# Patient Record
Sex: FEMALE | Race: OTHER | HISPANIC OR LATINO
[De-identification: names, ages, dates, MRNs, and addresses within clinical notes are randomized per-mention and may not be internally consistent; named-entity substitution may affect disease eponyms.]

---

## 2017-04-06 ENCOUNTER — RESULT REVIEW (OUTPATIENT)
Age: 62
End: 2017-04-06

## 2017-04-07 ENCOUNTER — APPOINTMENT (OUTPATIENT)
Dept: GASTROENTEROLOGY | Facility: GI CENTER | Age: 62
End: 2017-04-07

## 2017-04-07 ENCOUNTER — OUTPATIENT (OUTPATIENT)
Dept: OUTPATIENT SERVICES | Facility: HOSPITAL | Age: 62
LOS: 1 days | End: 2017-04-07
Payer: COMMERCIAL

## 2017-04-07 DIAGNOSIS — R10.13 EPIGASTRIC PAIN: ICD-10-CM

## 2017-04-07 PROCEDURE — 88342 IMHCHEM/IMCYTCHM 1ST ANTB: CPT | Mod: 26

## 2017-04-07 PROCEDURE — 43239 EGD BIOPSY SINGLE/MULTIPLE: CPT

## 2017-04-07 PROCEDURE — T1013: CPT

## 2017-04-07 PROCEDURE — 88342 IMHCHEM/IMCYTCHM 1ST ANTB: CPT

## 2017-04-07 PROCEDURE — 88305 TISSUE EXAM BY PATHOLOGIST: CPT | Mod: 26

## 2017-04-07 PROCEDURE — 88305 TISSUE EXAM BY PATHOLOGIST: CPT

## 2017-04-07 PROCEDURE — 88341 IMHCHEM/IMCYTCHM EA ADD ANTB: CPT | Mod: 26

## 2017-04-11 LAB — SURGICAL PATHOLOGY FINAL REPORT - CH: SIGNIFICANT CHANGE UP

## 2017-06-20 ENCOUNTER — APPOINTMENT (OUTPATIENT)
Dept: GASTROENTEROLOGY | Facility: CLINIC | Age: 62
End: 2017-06-20

## 2017-06-20 VITALS
HEIGHT: 63 IN | DIASTOLIC BLOOD PRESSURE: 75 MMHG | SYSTOLIC BLOOD PRESSURE: 131 MMHG | WEIGHT: 146 LBS | BODY MASS INDEX: 25.87 KG/M2 | HEART RATE: 86 BPM | RESPIRATION RATE: 12 BRPM

## 2017-06-20 RX ORDER — OMEPRAZOLE 40 MG/1
40 CAPSULE, DELAYED RELEASE ORAL
Qty: 30 | Refills: 5 | Status: DISCONTINUED | COMMUNITY
Start: 2017-04-10 | End: 2017-06-20

## 2017-06-20 RX ORDER — OMEPRAZOLE 40 MG/1
40 CAPSULE, DELAYED RELEASE ORAL
Qty: 30 | Refills: 5 | Status: DISCONTINUED | COMMUNITY
Start: 2017-04-07 | End: 2017-06-20

## 2017-08-16 ENCOUNTER — APPOINTMENT (OUTPATIENT)
Dept: GASTROENTEROLOGY | Facility: GI CENTER | Age: 62
End: 2017-08-16

## 2017-08-29 ENCOUNTER — APPOINTMENT (OUTPATIENT)
Dept: VASCULAR SURGERY | Facility: CLINIC | Age: 62
End: 2017-08-29
Payer: MEDICAID

## 2017-08-29 VITALS
TEMPERATURE: 98 F | BODY MASS INDEX: 25.69 KG/M2 | SYSTOLIC BLOOD PRESSURE: 155 MMHG | DIASTOLIC BLOOD PRESSURE: 83 MMHG | WEIGHT: 145 LBS | RESPIRATION RATE: 12 BRPM | HEIGHT: 63 IN | HEART RATE: 88 BPM | OXYGEN SATURATION: 98 %

## 2017-08-29 DIAGNOSIS — E11.9 TYPE 2 DIABETES MELLITUS W/OUT COMPLICATIONS: ICD-10-CM

## 2017-08-29 DIAGNOSIS — E03.9 HYPOTHYROIDISM, UNSPECIFIED: ICD-10-CM

## 2017-08-29 DIAGNOSIS — E78.5 HYPERLIPIDEMIA, UNSPECIFIED: ICD-10-CM

## 2017-08-29 PROCEDURE — 99203 OFFICE O/P NEW LOW 30 MIN: CPT | Mod: 25

## 2017-08-29 PROCEDURE — 93970 EXTREMITY STUDY: CPT

## 2017-10-21 ENCOUNTER — APPOINTMENT (OUTPATIENT)
Dept: MAMMOGRAPHY | Facility: CLINIC | Age: 62
End: 2017-10-21

## 2017-11-10 ENCOUNTER — EMERGENCY (EMERGENCY)
Facility: HOSPITAL | Age: 62
LOS: 1 days | Discharge: HOME | End: 2017-11-10
Attending: EMERGENCY MEDICINE
Payer: COMMERCIAL

## 2017-11-10 VITALS
SYSTOLIC BLOOD PRESSURE: 152 MMHG | OXYGEN SATURATION: 99 % | WEIGHT: 139.99 LBS | HEIGHT: 63 IN | RESPIRATION RATE: 20 BRPM | TEMPERATURE: 98 F | DIASTOLIC BLOOD PRESSURE: 89 MMHG | HEART RATE: 84 BPM

## 2017-11-10 PROCEDURE — T1013: CPT

## 2017-11-10 PROCEDURE — 99283 EMERGENCY DEPT VISIT LOW MDM: CPT | Mod: 25

## 2017-11-10 PROCEDURE — 96372 THER/PROPH/DIAG INJ SC/IM: CPT

## 2017-11-10 PROCEDURE — 99284 EMERGENCY DEPT VISIT MOD MDM: CPT

## 2017-11-10 RX ORDER — IBUPROFEN 200 MG
1 TABLET ORAL
Qty: 20 | Refills: 0 | OUTPATIENT
Start: 2017-11-10 | End: 2017-11-15

## 2017-11-10 RX ORDER — KETOROLAC TROMETHAMINE 30 MG/ML
60 SYRINGE (ML) INJECTION ONCE
Qty: 0 | Refills: 0 | Status: DISCONTINUED | OUTPATIENT
Start: 2017-11-10 | End: 2017-11-10

## 2017-11-10 RX ORDER — DEXAMETHASONE 0.5 MG/5ML
6 ELIXIR ORAL ONCE
Qty: 0 | Refills: 0 | Status: COMPLETED | OUTPATIENT
Start: 2017-11-10 | End: 2017-11-10

## 2017-11-10 RX ORDER — PENICILLIN V POTASSIUM 250 MG
500 TABLET ORAL ONCE
Qty: 0 | Refills: 0 | Status: COMPLETED | OUTPATIENT
Start: 2017-11-10 | End: 2017-11-10

## 2017-11-10 RX ORDER — PENICILLIN V POTASSIUM 250 MG
1 TABLET ORAL
Qty: 40 | Refills: 0 | OUTPATIENT
Start: 2017-11-10 | End: 2017-11-20

## 2017-11-10 RX ADMIN — Medication 60 MILLIGRAM(S): at 20:49

## 2017-11-10 RX ADMIN — Medication 500 MILLIGRAM(S): at 20:46

## 2017-11-10 RX ADMIN — Medication 6 MILLIGRAM(S): at 20:47

## 2017-11-10 RX ADMIN — Medication 60 MILLIGRAM(S): at 20:46

## 2017-11-10 NOTE — ED STATDOCS - MEDICAL DECISION MAKING DETAILS
Sore throat consistent with strep throat: Will treat empirically, first does in ED, and f/u as instructed.

## 2017-11-10 NOTE — ED STATDOCS - ENMT, MLM
Cervical adenopathy, tonsils are slightly enlarged and erythematous, scattered exudates, tongue and uvula are midline. No drooling.

## 2017-11-10 NOTE — ED STATDOCS - OBJECTIVE STATEMENT
63 y/o F presents to he ED c/o sore throat, fevers, and chills which onset 2 days ago. She states that she has pain when she swallows. She denies congestion, coughing, nausea, vomiting and rashes. Pt is allergic to morphine. No further complaints at this time. 61 y/o F presents to the ED c/o sore throat, fevers, and chills which onset 2 days ago. She states that she has pain when she swallows. She denies congestion, coughing, nausea, vomiting and rashes. Pt is allergic to morphine. No further complaints at this time.

## 2017-11-20 ENCOUNTER — OUTPATIENT (OUTPATIENT)
Dept: OUTPATIENT SERVICES | Facility: HOSPITAL | Age: 62
LOS: 1 days | End: 2017-11-20
Payer: COMMERCIAL

## 2017-11-20 ENCOUNTER — APPOINTMENT (OUTPATIENT)
Dept: GASTROENTEROLOGY | Facility: GI CENTER | Age: 62
End: 2017-11-20
Payer: MEDICAID

## 2017-11-20 VITALS
RESPIRATION RATE: 12 BRPM | DIASTOLIC BLOOD PRESSURE: 80 MMHG | HEIGHT: 63 IN | HEART RATE: 90 BPM | WEIGHT: 145 LBS | TEMPERATURE: 98 F | SYSTOLIC BLOOD PRESSURE: 150 MMHG | BODY MASS INDEX: 25.69 KG/M2

## 2017-11-20 DIAGNOSIS — Z12.11 ENCOUNTER FOR SCREENING FOR MALIGNANT NEOPLASM OF COLON: ICD-10-CM

## 2017-11-20 LAB
GLUCOSE BLDC GLUCOMTR-MCNC: 261 MG/DL — HIGH (ref 70–99)
GLUCOSE BLDC GLUCOMTR-MCNC: 272 MG/DL — HIGH (ref 70–99)
GLUCOSE BLDC GLUCOMTR-MCNC: 305 MG/DL — HIGH (ref 70–99)

## 2017-11-20 PROCEDURE — G0121: CPT

## 2017-11-20 PROCEDURE — T1013: CPT

## 2017-11-20 PROCEDURE — 45378 DIAGNOSTIC COLONOSCOPY: CPT

## 2017-11-20 PROCEDURE — 82962 GLUCOSE BLOOD TEST: CPT

## 2018-04-25 ENCOUNTER — EMERGENCY (EMERGENCY)
Facility: HOSPITAL | Age: 63
LOS: 1 days | Discharge: HOME | End: 2018-04-25
Attending: EMERGENCY MEDICINE
Payer: COMMERCIAL

## 2018-04-25 VITALS
RESPIRATION RATE: 16 BRPM | TEMPERATURE: 98 F | SYSTOLIC BLOOD PRESSURE: 149 MMHG | HEART RATE: 85 BPM | DIASTOLIC BLOOD PRESSURE: 76 MMHG | OXYGEN SATURATION: 97 %

## 2018-04-25 VITALS — WEIGHT: 138.01 LBS | HEIGHT: 63 IN

## 2018-04-25 LAB
APPEARANCE UR: CLEAR — SIGNIFICANT CHANGE UP
BACTERIA # UR AUTO: NEGATIVE — SIGNIFICANT CHANGE UP
BILIRUB UR-MCNC: NEGATIVE — SIGNIFICANT CHANGE UP
COLOR SPEC: YELLOW — SIGNIFICANT CHANGE UP
DIFF PNL FLD: NEGATIVE — SIGNIFICANT CHANGE UP
EPI CELLS # UR: NEGATIVE — SIGNIFICANT CHANGE UP
GLUCOSE UR QL: 1000 MG/DL
KETONES UR-MCNC: NEGATIVE — SIGNIFICANT CHANGE UP
LEUKOCYTE ESTERASE UR-ACNC: ABNORMAL
NITRITE UR-MCNC: NEGATIVE — SIGNIFICANT CHANGE UP
PH UR: 6.5 — SIGNIFICANT CHANGE UP (ref 5–8)
PROT UR-MCNC: NEGATIVE MG/DL — SIGNIFICANT CHANGE UP
RBC CASTS # UR COMP ASSIST: NEGATIVE /HPF — SIGNIFICANT CHANGE UP (ref 0–4)
SP GR SPEC: 1.01 — SIGNIFICANT CHANGE UP (ref 1.01–1.02)
UROBILINOGEN FLD QL: NEGATIVE MG/DL — SIGNIFICANT CHANGE UP
WBC UR QL: SIGNIFICANT CHANGE UP

## 2018-04-25 PROCEDURE — 99284 EMERGENCY DEPT VISIT MOD MDM: CPT

## 2018-04-25 PROCEDURE — 82962 GLUCOSE BLOOD TEST: CPT

## 2018-04-25 PROCEDURE — 87086 URINE CULTURE/COLONY COUNT: CPT

## 2018-04-25 PROCEDURE — 87186 SC STD MICRODIL/AGAR DIL: CPT

## 2018-04-25 PROCEDURE — T1013: CPT

## 2018-04-25 PROCEDURE — 81001 URINALYSIS AUTO W/SCOPE: CPT

## 2018-04-25 PROCEDURE — 87070 CULTURE OTHR SPECIMN AEROBIC: CPT

## 2018-04-25 RX ORDER — METRONIDAZOLE 500 MG
500 TABLET ORAL ONCE
Qty: 0 | Refills: 0 | Status: COMPLETED | OUTPATIENT
Start: 2018-04-25 | End: 2018-04-25

## 2018-04-25 RX ORDER — METRONIDAZOLE 500 MG
1 TABLET ORAL
Qty: 14 | Refills: 0 | OUTPATIENT
Start: 2018-04-25 | End: 2018-05-01

## 2018-04-25 RX ORDER — MICONAZOLE NITRATE 2 %
1 CREAM (GRAM) TOPICAL
Qty: 1 | Refills: 0 | OUTPATIENT
Start: 2018-04-25 | End: 2018-04-27

## 2018-04-25 RX ORDER — FLUCONAZOLE 150 MG/1
150 TABLET ORAL ONCE
Qty: 0 | Refills: 0 | Status: COMPLETED | OUTPATIENT
Start: 2018-04-25 | End: 2018-04-25

## 2018-04-25 RX ADMIN — FLUCONAZOLE 150 MILLIGRAM(S): 150 TABLET ORAL at 19:07

## 2018-04-25 RX ADMIN — Medication 500 MILLIGRAM(S): at 19:07

## 2018-04-25 NOTE — ED ADULT NURSE NOTE - OBJECTIVE STATEMENT
Patient reports increased frequency of urination, burning w/ urination, and vaginal itching-8 days, patient believes its because her uterus "fell down", reports "she put a glove on and felt her own uterus in her vagina".

## 2018-04-25 NOTE — ED ADULT TRIAGE NOTE - CHIEF COMPLAINT QUOTE
States she has a "low uterus and feels like a ball is coming out of my vagina", also complaining of frequent urination

## 2018-04-25 NOTE — ED PROVIDER NOTE - PROGRESS NOTE DETAILS
Accucheck 292- Pt on glipizide. Pt encouraged diet diet and to f/u PMD for better glucose control. Pt also instructed to f/u with GYN for pelvic organ prolapse.

## 2018-04-25 NOTE — ED PROVIDER NOTE - OBJECTIVE STATEMENT
64 yo F presented to ED c/o pressure/bulge to vaginal area x couple weeks following a fall. Pt also c/o vaginal irritation and discharge x couple days. Pt states that she was seen by by GYN 4 months ago and treated for similar symptoms with cream and resolved. Pt denies any fevers/chills/abd pain/back pain or STD exposure. Pt is sexually active- with - last approx. 2 weeks ago. + dysuria. 64 yo F pmh DM, HTN presented to ED c/o pressure/bulge to vaginal area x couple weeks following a fall. Pt also c/o vaginal irritation and discharge x couple days. Pt states that she was seen by by GYN 4 months ago and treated for similar symptoms with cream and resolved. Pt denies any fevers/chills/abd pain/back pain or STD exposure. Pt is sexually active- with - last approx. 2 weeks ago. + dysuria.

## 2018-04-25 NOTE — ED PROVIDER NOTE - ATTENDING CONTRIBUTION TO CARE
62 yo F pmh DM, HTN presented to ED c/o pressure/bulge to vaginal area x couple weeks following a fall. Pt also c/o vaginal irritation and discharge x couple days. Pt states that she was seen by by GYN 4 months ago and treated for similar symptoms with cream and resolved. Pt denies any fevers/chills/abd pain/back pain or STD exposure. Pt is sexually active- with - last approx. 2 weeks ago. + dysuria. pe awake in nad abd soft nontender ; gyn as per acp tx uti, vaginitis

## 2018-04-25 NOTE — ED PROVIDER NOTE - MEDICAL DECISION MAKING DETAILS
1. + urethral/bladder prolapse- pt instructed to f/u with GYN 2. Vaginal dc- BV vs fungal- cultures obtained- will tx for both. 3. Dysuria- UA C&S

## 2018-04-27 LAB
-  AMIKACIN: SIGNIFICANT CHANGE UP
-  AMPICILLIN/SULBACTAM: SIGNIFICANT CHANGE UP
-  AMPICILLIN: SIGNIFICANT CHANGE UP
-  AZTREONAM: SIGNIFICANT CHANGE UP
-  CEFAZOLIN: SIGNIFICANT CHANGE UP
-  CEFEPIME: SIGNIFICANT CHANGE UP
-  CEFOXITIN: SIGNIFICANT CHANGE UP
-  CEFTRIAXONE: SIGNIFICANT CHANGE UP
-  CIPROFLOXACIN: SIGNIFICANT CHANGE UP
-  ERTAPENEM: SIGNIFICANT CHANGE UP
-  GENTAMICIN: SIGNIFICANT CHANGE UP
-  IMIPENEM: SIGNIFICANT CHANGE UP
-  LEVOFLOXACIN: SIGNIFICANT CHANGE UP
-  MEROPENEM: SIGNIFICANT CHANGE UP
-  NITROFURANTOIN: SIGNIFICANT CHANGE UP
-  PIPERACILLIN/TAZOBACTAM: SIGNIFICANT CHANGE UP
-  TIGECYCLINE: SIGNIFICANT CHANGE UP
-  TOBRAMYCIN: SIGNIFICANT CHANGE UP
-  TRIMETHOPRIM/SULFAMETHOXAZOLE: SIGNIFICANT CHANGE UP
C TRACH RRNA SPEC QL NAA+PROBE: SIGNIFICANT CHANGE UP
CULTURE RESULTS: SIGNIFICANT CHANGE UP
METHOD TYPE: SIGNIFICANT CHANGE UP
N GONORRHOEA RRNA SPEC QL NAA+PROBE: SIGNIFICANT CHANGE UP
ORGANISM # SPEC MICROSCOPIC CNT: SIGNIFICANT CHANGE UP
ORGANISM # SPEC MICROSCOPIC CNT: SIGNIFICANT CHANGE UP
SPECIMEN SOURCE: SIGNIFICANT CHANGE UP

## 2018-04-30 RX ORDER — CEPHALEXIN 500 MG
1 CAPSULE ORAL
Qty: 28 | Refills: 0 | OUTPATIENT
Start: 2018-04-30 | End: 2018-05-06

## 2018-05-11 ENCOUNTER — APPOINTMENT (OUTPATIENT)
Dept: OBGYN | Facility: CLINIC | Age: 63
End: 2018-05-11
Payer: MEDICAID

## 2018-05-11 VITALS
BODY MASS INDEX: 24.8 KG/M2 | HEIGHT: 63 IN | DIASTOLIC BLOOD PRESSURE: 76 MMHG | HEART RATE: 74 BPM | WEIGHT: 140 LBS | SYSTOLIC BLOOD PRESSURE: 139 MMHG

## 2018-05-11 PROCEDURE — 99214 OFFICE O/P EST MOD 30 MIN: CPT

## 2018-05-14 LAB
C TRACH RRNA SPEC QL NAA+PROBE: NOT DETECTED
HPV HIGH+LOW RISK DNA PNL CVX: NOT DETECTED
N GONORRHOEA RRNA SPEC QL NAA+PROBE: NOT DETECTED
SOURCE AMPLIFICATION: NORMAL

## 2018-05-16 LAB — CYTOLOGY CVX/VAG DOC THIN PREP: NORMAL

## 2018-05-16 RX ORDER — CLOTRIMAZOLE AND BETAMETHASONE DIPROPIONATE 10; .5 MG/ML; MG/ML
1-0.05 LOTION TOPICAL TWICE DAILY
Qty: 15 | Refills: 0 | Status: DISCONTINUED | COMMUNITY
Start: 2018-05-11 | End: 2018-05-16

## 2018-05-24 ENCOUNTER — FORM ENCOUNTER (OUTPATIENT)
Age: 63
End: 2018-05-24

## 2018-05-25 ENCOUNTER — OUTPATIENT (OUTPATIENT)
Dept: OUTPATIENT SERVICES | Facility: HOSPITAL | Age: 63
LOS: 1 days | End: 2018-05-25
Payer: MEDICAID

## 2018-05-25 ENCOUNTER — APPOINTMENT (OUTPATIENT)
Dept: ULTRASOUND IMAGING | Facility: CLINIC | Age: 63
End: 2018-05-25
Payer: MEDICAID

## 2018-05-25 DIAGNOSIS — R10.2 PELVIC AND PERINEAL PAIN: ICD-10-CM

## 2018-05-25 PROCEDURE — 76856 US EXAM PELVIC COMPLETE: CPT

## 2018-05-25 PROCEDURE — 76856 US EXAM PELVIC COMPLETE: CPT | Mod: 26

## 2018-06-22 ENCOUNTER — APPOINTMENT (OUTPATIENT)
Dept: OBGYN | Facility: CLINIC | Age: 63
End: 2018-06-22
Payer: MEDICAID

## 2018-06-22 VITALS
BODY MASS INDEX: 24.63 KG/M2 | HEIGHT: 63 IN | SYSTOLIC BLOOD PRESSURE: 146 MMHG | HEART RATE: 73 BPM | WEIGHT: 139 LBS | DIASTOLIC BLOOD PRESSURE: 76 MMHG

## 2018-06-22 PROCEDURE — 99213 OFFICE O/P EST LOW 20 MIN: CPT

## 2018-07-01 ENCOUNTER — OUTPATIENT (OUTPATIENT)
Dept: OUTPATIENT SERVICES | Facility: HOSPITAL | Age: 63
LOS: 1 days | End: 2018-07-01
Payer: MEDICAID

## 2018-07-01 PROCEDURE — G9001: CPT

## 2018-07-03 ENCOUNTER — EMERGENCY (EMERGENCY)
Facility: HOSPITAL | Age: 63
LOS: 1 days | Discharge: HOME | End: 2018-07-03
Attending: EMERGENCY MEDICINE
Payer: COMMERCIAL

## 2018-07-03 VITALS — HEIGHT: 62 IN | WEIGHT: 139.99 LBS

## 2018-07-03 PROCEDURE — 99283 EMERGENCY DEPT VISIT LOW MDM: CPT | Mod: 25

## 2018-07-04 VITALS
HEART RATE: 86 BPM | RESPIRATION RATE: 18 BRPM | DIASTOLIC BLOOD PRESSURE: 78 MMHG | SYSTOLIC BLOOD PRESSURE: 124 MMHG | OXYGEN SATURATION: 99 %

## 2018-07-04 PROCEDURE — 99283 EMERGENCY DEPT VISIT LOW MDM: CPT

## 2018-07-04 RX ORDER — AMOXICILLIN 250 MG/5ML
1000 SUSPENSION, RECONSTITUTED, ORAL (ML) ORAL ONCE
Qty: 0 | Refills: 0 | Status: COMPLETED | OUTPATIENT
Start: 2018-07-04 | End: 2018-07-04

## 2018-07-04 RX ORDER — AMOXICILLIN 250 MG/5ML
1 SUSPENSION, RECONSTITUTED, ORAL (ML) ORAL
Qty: 14 | Refills: 0 | OUTPATIENT
Start: 2018-07-04 | End: 2018-07-10

## 2018-07-04 RX ADMIN — Medication 1000 MILLIGRAM(S): at 03:15

## 2018-07-04 NOTE — ED ADULT NURSE NOTE - OBJECTIVE STATEMENT
patient and daughter states that she has a sore throat with pain difficulty swallowing swollen glands and green phlegm

## 2018-07-04 NOTE — ED PROVIDER NOTE - OBJECTIVE STATEMENT
A 63 year old female pt with a hx of female pt with a hx of DM presents to the ED c/o throat pain. For the past two days the patient has been experiencing burning throat pain that is worse with swallowing. She has been taking Tylenol for her symptoms with some relief. Pt denies ay difficulty swallowing or breathing. denies fever. denies HA or neck pain. no chest pain or sob. no abd pain. no n/v/d. no urinary f/u/d. no back pain. no motor or sensory deficits. denies illicit drug use. no recent travel. no rash. no other acute issues symptoms or concerns. Dr. Lesa KIDD. Pharmacy: [facility]

## 2018-07-04 NOTE — ED ADULT NURSE NOTE - CHPI ED SYMPTOMS NEG
no change in level of consciousness/no chills/no syncope/no vomiting/no loss of consciousness/no nausea/no numbness/no fever/no weakness/no blurred vision

## 2018-07-15 ENCOUNTER — INPATIENT (INPATIENT)
Facility: HOSPITAL | Age: 63
LOS: 3 days | Discharge: HOME | DRG: 872 | End: 2018-07-19
Attending: INTERNAL MEDICINE | Admitting: INTERNAL MEDICINE
Payer: COMMERCIAL

## 2018-07-15 VITALS — WEIGHT: 139.99 LBS | HEIGHT: 65 IN

## 2018-07-15 DIAGNOSIS — N12 TUBULO-INTERSTITIAL NEPHRITIS, NOT SPECIFIED AS ACUTE OR CHRONIC: ICD-10-CM

## 2018-07-15 DIAGNOSIS — E03.9 HYPOTHYROIDISM, UNSPECIFIED: ICD-10-CM

## 2018-07-15 DIAGNOSIS — E11.65 TYPE 2 DIABETES MELLITUS WITH HYPERGLYCEMIA: ICD-10-CM

## 2018-07-15 DIAGNOSIS — A41.9 SEPSIS, UNSPECIFIED ORGANISM: ICD-10-CM

## 2018-07-15 LAB
ALBUMIN SERPL ELPH-MCNC: 4.2 G/DL — SIGNIFICANT CHANGE UP (ref 3.3–5.2)
ALP SERPL-CCNC: 77 U/L — SIGNIFICANT CHANGE UP (ref 40–120)
ALT FLD-CCNC: 11 U/L — SIGNIFICANT CHANGE UP
ANION GAP SERPL CALC-SCNC: 16 MMOL/L — SIGNIFICANT CHANGE UP (ref 5–17)
APPEARANCE UR: ABNORMAL
AST SERPL-CCNC: 12 U/L — SIGNIFICANT CHANGE UP
BACTERIA # UR AUTO: ABNORMAL
BASOPHILS # BLD AUTO: 0 K/UL — SIGNIFICANT CHANGE UP (ref 0–0.2)
BASOPHILS NFR BLD AUTO: 0.1 % — SIGNIFICANT CHANGE UP (ref 0–2)
BILIRUB SERPL-MCNC: 0.6 MG/DL — SIGNIFICANT CHANGE UP (ref 0.4–2)
BILIRUB UR-MCNC: NEGATIVE — SIGNIFICANT CHANGE UP
BUN SERPL-MCNC: 10 MG/DL — SIGNIFICANT CHANGE UP (ref 8–20)
CALCIUM SERPL-MCNC: 9.4 MG/DL — SIGNIFICANT CHANGE UP (ref 8.6–10.2)
CHLORIDE SERPL-SCNC: 91 MMOL/L — LOW (ref 98–107)
CO2 SERPL-SCNC: 24 MMOL/L — SIGNIFICANT CHANGE UP (ref 22–29)
COLOR SPEC: YELLOW — SIGNIFICANT CHANGE UP
COMMENT - URINE: SIGNIFICANT CHANGE UP
CREAT SERPL-MCNC: 0.6 MG/DL — SIGNIFICANT CHANGE UP (ref 0.5–1.3)
DIFF PNL FLD: ABNORMAL
EOSINOPHIL # BLD AUTO: 0 K/UL — SIGNIFICANT CHANGE UP (ref 0–0.5)
EOSINOPHIL NFR BLD AUTO: 0.1 % — SIGNIFICANT CHANGE UP (ref 0–6)
EPI CELLS # UR: SIGNIFICANT CHANGE UP
GLUCOSE BLDC GLUCOMTR-MCNC: 220 MG/DL — HIGH (ref 70–99)
GLUCOSE SERPL-MCNC: 347 MG/DL — HIGH (ref 70–115)
GLUCOSE UR QL: 1000 MG/DL
HCT VFR BLD CALC: 41.8 % — SIGNIFICANT CHANGE UP (ref 37–47)
HGB BLD-MCNC: 14 G/DL — SIGNIFICANT CHANGE UP (ref 12–16)
KETONES UR-MCNC: ABNORMAL
LACTATE BLDV-MCNC: 2.1 MMOL/L — HIGH (ref 0.5–2)
LACTATE BLDV-MCNC: 2.7 MMOL/L — HIGH (ref 0.5–2)
LEUKOCYTE ESTERASE UR-ACNC: ABNORMAL
LIDOCAIN IGE QN: 20 U/L — LOW (ref 22–51)
LYMPHOCYTES # BLD AUTO: 1.6 K/UL — SIGNIFICANT CHANGE UP (ref 1–4.8)
LYMPHOCYTES # BLD AUTO: 10.6 % — LOW (ref 20–55)
MCHC RBC-ENTMCNC: 27.9 PG — SIGNIFICANT CHANGE UP (ref 27–31)
MCHC RBC-ENTMCNC: 33.5 G/DL — SIGNIFICANT CHANGE UP (ref 32–36)
MCV RBC AUTO: 83.3 FL — SIGNIFICANT CHANGE UP (ref 81–99)
MONOCYTES # BLD AUTO: 0.9 K/UL — HIGH (ref 0–0.8)
MONOCYTES NFR BLD AUTO: 5.9 % — SIGNIFICANT CHANGE UP (ref 3–10)
NEUTROPHILS # BLD AUTO: 12.7 K/UL — HIGH (ref 1.8–8)
NEUTROPHILS NFR BLD AUTO: 82.9 % — HIGH (ref 37–73)
NITRITE UR-MCNC: NEGATIVE — SIGNIFICANT CHANGE UP
PH UR: 7 — SIGNIFICANT CHANGE UP (ref 5–8)
PLATELET # BLD AUTO: 202 K/UL — SIGNIFICANT CHANGE UP (ref 150–400)
POTASSIUM SERPL-MCNC: 4.2 MMOL/L — SIGNIFICANT CHANGE UP (ref 3.5–5.3)
POTASSIUM SERPL-SCNC: 4.2 MMOL/L — SIGNIFICANT CHANGE UP (ref 3.5–5.3)
PROT SERPL-MCNC: 7.8 G/DL — SIGNIFICANT CHANGE UP (ref 6.6–8.7)
PROT UR-MCNC: 30 MG/DL
RBC # BLD: 5.02 M/UL — SIGNIFICANT CHANGE UP (ref 4.4–5.2)
RBC # FLD: 12.6 % — SIGNIFICANT CHANGE UP (ref 11–15.6)
RBC CASTS # UR COMP ASSIST: ABNORMAL /HPF (ref 0–4)
SODIUM SERPL-SCNC: 131 MMOL/L — LOW (ref 135–145)
SP GR SPEC: 1 — LOW (ref 1.01–1.02)
UROBILINOGEN FLD QL: NEGATIVE MG/DL — SIGNIFICANT CHANGE UP
WBC # BLD: 15.3 K/UL — HIGH (ref 4.8–10.8)
WBC # FLD AUTO: 15.3 K/UL — HIGH (ref 4.8–10.8)
WBC UR QL: >50

## 2018-07-15 PROCEDURE — 99285 EMERGENCY DEPT VISIT HI MDM: CPT

## 2018-07-15 PROCEDURE — 99223 1ST HOSP IP/OBS HIGH 75: CPT

## 2018-07-15 PROCEDURE — 74177 CT ABD & PELVIS W/CONTRAST: CPT | Mod: 26

## 2018-07-15 RX ORDER — KETOROLAC TROMETHAMINE 30 MG/ML
30 SYRINGE (ML) INJECTION EVERY 8 HOURS
Qty: 0 | Refills: 0 | Status: DISCONTINUED | OUTPATIENT
Start: 2018-07-15 | End: 2018-07-17

## 2018-07-15 RX ORDER — GLUCAGON INJECTION, SOLUTION 0.5 MG/.1ML
1 INJECTION, SOLUTION SUBCUTANEOUS ONCE
Qty: 0 | Refills: 0 | Status: DISCONTINUED | OUTPATIENT
Start: 2018-07-15 | End: 2018-07-19

## 2018-07-15 RX ORDER — DEXTROSE 50 % IN WATER 50 %
25 SYRINGE (ML) INTRAVENOUS ONCE
Qty: 0 | Refills: 0 | Status: DISCONTINUED | OUTPATIENT
Start: 2018-07-15 | End: 2018-07-19

## 2018-07-15 RX ORDER — DEXTROSE 50 % IN WATER 50 %
12.5 SYRINGE (ML) INTRAVENOUS ONCE
Qty: 0 | Refills: 0 | Status: DISCONTINUED | OUTPATIENT
Start: 2018-07-15 | End: 2018-07-19

## 2018-07-15 RX ORDER — SODIUM CHLORIDE 9 MG/ML
1000 INJECTION, SOLUTION INTRAVENOUS
Qty: 0 | Refills: 0 | Status: DISCONTINUED | OUTPATIENT
Start: 2018-07-15 | End: 2018-07-19

## 2018-07-15 RX ORDER — ACETAMINOPHEN 500 MG
650 TABLET ORAL EVERY 6 HOURS
Qty: 0 | Refills: 0 | Status: DISCONTINUED | OUTPATIENT
Start: 2018-07-15 | End: 2018-07-19

## 2018-07-15 RX ORDER — CEFTRIAXONE 500 MG/1
2 INJECTION, POWDER, FOR SOLUTION INTRAMUSCULAR; INTRAVENOUS EVERY 24 HOURS
Qty: 0 | Refills: 0 | Status: DISCONTINUED | OUTPATIENT
Start: 2018-07-15 | End: 2018-07-18

## 2018-07-15 RX ORDER — CEFTRIAXONE 500 MG/1
1 INJECTION, POWDER, FOR SOLUTION INTRAMUSCULAR; INTRAVENOUS ONCE
Qty: 0 | Refills: 0 | Status: COMPLETED | OUTPATIENT
Start: 2018-07-15 | End: 2018-07-15

## 2018-07-15 RX ORDER — LEVOTHYROXINE SODIUM 125 MCG
0 TABLET ORAL
Qty: 0 | Refills: 0 | COMMUNITY

## 2018-07-15 RX ORDER — KETOROLAC TROMETHAMINE 30 MG/ML
30 SYRINGE (ML) INJECTION ONCE
Qty: 0 | Refills: 0 | Status: DISCONTINUED | OUTPATIENT
Start: 2018-07-15 | End: 2018-07-15

## 2018-07-15 RX ORDER — SODIUM CHLORIDE 9 MG/ML
1000 INJECTION INTRAMUSCULAR; INTRAVENOUS; SUBCUTANEOUS ONCE
Qty: 0 | Refills: 0 | Status: COMPLETED | OUTPATIENT
Start: 2018-07-15 | End: 2018-07-15

## 2018-07-15 RX ORDER — INSULIN LISPRO 100/ML
VIAL (ML) SUBCUTANEOUS
Qty: 0 | Refills: 0 | Status: DISCONTINUED | OUTPATIENT
Start: 2018-07-15 | End: 2018-07-19

## 2018-07-15 RX ORDER — ACETAMINOPHEN 500 MG
1000 TABLET ORAL ONCE
Qty: 0 | Refills: 0 | Status: COMPLETED | OUTPATIENT
Start: 2018-07-15 | End: 2018-07-15

## 2018-07-15 RX ORDER — INSULIN GLARGINE 100 [IU]/ML
15 INJECTION, SOLUTION SUBCUTANEOUS AT BEDTIME
Qty: 0 | Refills: 0 | Status: DISCONTINUED | OUTPATIENT
Start: 2018-07-15 | End: 2018-07-16

## 2018-07-15 RX ORDER — DEXTROSE 50 % IN WATER 50 %
15 SYRINGE (ML) INTRAVENOUS ONCE
Qty: 0 | Refills: 0 | Status: DISCONTINUED | OUTPATIENT
Start: 2018-07-15 | End: 2018-07-19

## 2018-07-15 RX ORDER — LEVOTHYROXINE SODIUM 125 MCG
100 TABLET ORAL DAILY
Qty: 0 | Refills: 0 | Status: DISCONTINUED | OUTPATIENT
Start: 2018-07-15 | End: 2018-07-19

## 2018-07-15 RX ORDER — ONDANSETRON 8 MG/1
4 TABLET, FILM COATED ORAL EVERY 6 HOURS
Qty: 0 | Refills: 0 | Status: DISCONTINUED | OUTPATIENT
Start: 2018-07-15 | End: 2018-07-19

## 2018-07-15 RX ORDER — LISINOPRIL 2.5 MG/1
0 TABLET ORAL
Qty: 0 | Refills: 0 | COMMUNITY

## 2018-07-15 RX ORDER — INSULIN HUMAN 100 [IU]/ML
3 INJECTION, SOLUTION SUBCUTANEOUS ONCE
Qty: 0 | Refills: 0 | Status: COMPLETED | OUTPATIENT
Start: 2018-07-15 | End: 2018-07-15

## 2018-07-15 RX ORDER — SACCHAROMYCES BOULARDII 250 MG
250 POWDER IN PACKET (EA) ORAL
Qty: 0 | Refills: 0 | Status: DISCONTINUED | OUTPATIENT
Start: 2018-07-15 | End: 2018-07-19

## 2018-07-15 RX ORDER — SODIUM CHLORIDE 9 MG/ML
500 INJECTION INTRAMUSCULAR; INTRAVENOUS; SUBCUTANEOUS ONCE
Qty: 0 | Refills: 0 | Status: COMPLETED | OUTPATIENT
Start: 2018-07-15 | End: 2018-07-15

## 2018-07-15 RX ADMIN — SODIUM CHLORIDE 100 MILLILITER(S): 9 INJECTION, SOLUTION INTRAVENOUS at 18:37

## 2018-07-15 RX ADMIN — ONDANSETRON 4 MILLIGRAM(S): 8 TABLET, FILM COATED ORAL at 21:20

## 2018-07-15 RX ADMIN — SODIUM CHLORIDE 1000 MILLILITER(S): 9 INJECTION INTRAMUSCULAR; INTRAVENOUS; SUBCUTANEOUS at 15:37

## 2018-07-15 RX ADMIN — INSULIN GLARGINE 15 UNIT(S): 100 INJECTION, SOLUTION SUBCUTANEOUS at 21:21

## 2018-07-15 RX ADMIN — Medication 650 MILLIGRAM(S): at 18:00

## 2018-07-15 RX ADMIN — CEFTRIAXONE 100 GRAM(S): 500 INJECTION, POWDER, FOR SOLUTION INTRAMUSCULAR; INTRAVENOUS at 17:43

## 2018-07-15 RX ADMIN — Medication 30 MILLIGRAM(S): at 21:21

## 2018-07-15 RX ADMIN — SODIUM CHLORIDE 1000 MILLILITER(S): 9 INJECTION INTRAMUSCULAR; INTRAVENOUS; SUBCUTANEOUS at 12:52

## 2018-07-15 RX ADMIN — Medication 400 MILLIGRAM(S): at 21:20

## 2018-07-15 RX ADMIN — Medication 650 MILLIGRAM(S): at 17:30

## 2018-07-15 RX ADMIN — Medication 30 MILLIGRAM(S): at 12:52

## 2018-07-15 RX ADMIN — Medication 30 MILLIGRAM(S): at 21:20

## 2018-07-15 RX ADMIN — Medication 30 MILLIGRAM(S): at 13:22

## 2018-07-15 RX ADMIN — SODIUM CHLORIDE 1000 MILLILITER(S): 9 INJECTION INTRAMUSCULAR; INTRAVENOUS; SUBCUTANEOUS at 21:48

## 2018-07-15 NOTE — ED ADULT NURSE NOTE - OBJECTIVE STATEMENT
pt alert and awake x3, arrived to ED with dizziness, states she has a fever, denies taking temp, denies chest pain/sob.

## 2018-07-15 NOTE — ED STATDOCS - OBJECTIVE STATEMENT
63 year old female with c/o dizziness. pt was in her USOH  until 2 days ago when she developed a sharp LLQ pain with nausea and multiple episodes of vomiting

## 2018-07-15 NOTE — ED ADULT TRIAGE NOTE - CHIEF COMPLAINT QUOTE
Patient is awake and oriented times 4, arrives ambulatory from home with a steady gait, patient complains "I feel dizzy and I have a fever"

## 2018-07-15 NOTE — H&P ADULT - HISTORY OF PRESENT ILLNESS
62 yo F w/ hx Dm2, hypothyroid presents to ER for 3-4 day hx of worsening left flank and suprapubic Tenderness.   subjective fevers at home along with dysuria and chills.  no chest pain/sob. + nausea/vomiting.  seen by PMD recently and prescribed vaginal antifungal for then complaints of vaginal pruritus

## 2018-07-15 NOTE — H&P ADULT - ASSESSMENT
62 yo F w/ hx Dm2, hypothyroid presents to ER for 3-4 day hx of worsening left flank and suprapubic Tenderness.   in ER, meets sepsis criteria due to cystitis/pyelonephritis.

## 2018-07-16 DIAGNOSIS — R78.81 BACTEREMIA: ICD-10-CM

## 2018-07-16 LAB
ANION GAP SERPL CALC-SCNC: 14 MMOL/L — SIGNIFICANT CHANGE UP (ref 5–17)
BASOPHILS # BLD AUTO: 0 K/UL — SIGNIFICANT CHANGE UP (ref 0–0.2)
BASOPHILS NFR BLD AUTO: 0.1 % — SIGNIFICANT CHANGE UP (ref 0–2)
BUN SERPL-MCNC: 8 MG/DL — SIGNIFICANT CHANGE UP (ref 8–20)
CALCIUM SERPL-MCNC: 8.8 MG/DL — SIGNIFICANT CHANGE UP (ref 8.6–10.2)
CHLORIDE SERPL-SCNC: 100 MMOL/L — SIGNIFICANT CHANGE UP (ref 98–107)
CO2 SERPL-SCNC: 25 MMOL/L — SIGNIFICANT CHANGE UP (ref 22–29)
CREAT SERPL-MCNC: 0.47 MG/DL — LOW (ref 0.5–1.3)
E COLI DNA BLD POS QL NAA+NON-PROBE: SIGNIFICANT CHANGE UP
EOSINOPHIL # BLD AUTO: 0 K/UL — SIGNIFICANT CHANGE UP (ref 0–0.5)
EOSINOPHIL NFR BLD AUTO: 0.2 % — SIGNIFICANT CHANGE UP (ref 0–6)
GLUCOSE BLDC GLUCOMTR-MCNC: 211 MG/DL — HIGH (ref 70–99)
GLUCOSE BLDC GLUCOMTR-MCNC: 211 MG/DL — HIGH (ref 70–99)
GLUCOSE BLDC GLUCOMTR-MCNC: 215 MG/DL — HIGH (ref 70–99)
GLUCOSE BLDC GLUCOMTR-MCNC: 246 MG/DL — HIGH (ref 70–99)
GLUCOSE SERPL-MCNC: 225 MG/DL — HIGH (ref 70–115)
HBA1C BLD-MCNC: 11.2 % — HIGH (ref 4–5.6)
HCT VFR BLD CALC: 36.8 % — LOW (ref 37–47)
HCT VFR BLD CALC: 37.9 % — SIGNIFICANT CHANGE UP (ref 37–47)
HGB BLD-MCNC: 12.3 G/DL — SIGNIFICANT CHANGE UP (ref 12–16)
HGB BLD-MCNC: 12.6 G/DL — SIGNIFICANT CHANGE UP (ref 12–16)
LACTATE SERPL-SCNC: 1.2 MMOL/L — SIGNIFICANT CHANGE UP (ref 0.5–2)
LYMPHOCYTES # BLD AUTO: 1.7 K/UL — SIGNIFICANT CHANGE UP (ref 1–4.8)
LYMPHOCYTES # BLD AUTO: 12.8 % — LOW (ref 20–55)
LYMPHOCYTES # BLD AUTO: 8 % — LOW (ref 20–55)
MCHC RBC-ENTMCNC: 27.9 PG — SIGNIFICANT CHANGE UP (ref 27–31)
MCHC RBC-ENTMCNC: 28.1 PG — SIGNIFICANT CHANGE UP (ref 27–31)
MCHC RBC-ENTMCNC: 33.2 G/DL — SIGNIFICANT CHANGE UP (ref 32–36)
MCHC RBC-ENTMCNC: 33.4 G/DL — SIGNIFICANT CHANGE UP (ref 32–36)
MCV RBC AUTO: 83.8 FL — SIGNIFICANT CHANGE UP (ref 81–99)
MCV RBC AUTO: 84 FL — SIGNIFICANT CHANGE UP (ref 81–99)
METHOD TYPE: SIGNIFICANT CHANGE UP
MONOCYTES # BLD AUTO: 0.7 K/UL — SIGNIFICANT CHANGE UP (ref 0–0.8)
MONOCYTES NFR BLD AUTO: 5 % — SIGNIFICANT CHANGE UP (ref 3–10)
MONOCYTES NFR BLD AUTO: 5.6 % — SIGNIFICANT CHANGE UP (ref 3–10)
NEUTROPHILS # BLD AUTO: 10.5 K/UL — HIGH (ref 1.8–8)
NEUTROPHILS NFR BLD AUTO: 80.9 % — HIGH (ref 37–73)
NEUTROPHILS NFR BLD AUTO: 83 % — HIGH (ref 37–73)
NEUTS BAND # BLD: 4 % — SIGNIFICANT CHANGE UP (ref 0–8)
PLAT MORPH BLD: NORMAL — SIGNIFICANT CHANGE UP
PLATELET # BLD AUTO: 163 K/UL — SIGNIFICANT CHANGE UP (ref 150–400)
PLATELET # BLD AUTO: 164 K/UL — SIGNIFICANT CHANGE UP (ref 150–400)
POTASSIUM SERPL-MCNC: 4.2 MMOL/L — SIGNIFICANT CHANGE UP (ref 3.5–5.3)
POTASSIUM SERPL-SCNC: 4.2 MMOL/L — SIGNIFICANT CHANGE UP (ref 3.5–5.3)
RBC # BLD: 4.38 M/UL — LOW (ref 4.4–5.2)
RBC # BLD: 4.52 M/UL — SIGNIFICANT CHANGE UP (ref 4.4–5.2)
RBC # FLD: 12.5 % — SIGNIFICANT CHANGE UP (ref 11–15.6)
RBC # FLD: 12.6 % — SIGNIFICANT CHANGE UP (ref 11–15.6)
RBC BLD AUTO: NORMAL — SIGNIFICANT CHANGE UP
SODIUM SERPL-SCNC: 139 MMOL/L — SIGNIFICANT CHANGE UP (ref 135–145)
WBC # BLD: 13 K/UL — HIGH (ref 4.8–10.8)
WBC # BLD: 16.9 K/UL — HIGH (ref 4.8–10.8)
WBC # FLD AUTO: 13 K/UL — HIGH (ref 4.8–10.8)
WBC # FLD AUTO: 16.9 K/UL — HIGH (ref 4.8–10.8)

## 2018-07-16 PROCEDURE — 99223 1ST HOSP IP/OBS HIGH 75: CPT

## 2018-07-16 PROCEDURE — 99233 SBSQ HOSP IP/OBS HIGH 50: CPT

## 2018-07-16 RX ORDER — INSULIN GLARGINE 100 [IU]/ML
25 INJECTION, SOLUTION SUBCUTANEOUS AT BEDTIME
Qty: 0 | Refills: 0 | Status: DISCONTINUED | OUTPATIENT
Start: 2018-07-16 | End: 2018-07-18

## 2018-07-16 RX ORDER — INSULIN LISPRO 100/ML
7 VIAL (ML) SUBCUTANEOUS
Qty: 0 | Refills: 0 | Status: DISCONTINUED | OUTPATIENT
Start: 2018-07-16 | End: 2018-07-18

## 2018-07-16 RX ORDER — ENOXAPARIN SODIUM 100 MG/ML
40 INJECTION SUBCUTANEOUS EVERY 24 HOURS
Qty: 0 | Refills: 0 | Status: DISCONTINUED | OUTPATIENT
Start: 2018-07-16 | End: 2018-07-19

## 2018-07-16 RX ADMIN — Medication 4: at 17:58

## 2018-07-16 RX ADMIN — Medication 30 MILLIGRAM(S): at 05:23

## 2018-07-16 RX ADMIN — Medication 4: at 08:38

## 2018-07-16 RX ADMIN — Medication 250 MILLIGRAM(S): at 17:58

## 2018-07-16 RX ADMIN — Medication 4: at 11:59

## 2018-07-16 RX ADMIN — Medication 650 MILLIGRAM(S): at 11:58

## 2018-07-16 RX ADMIN — SODIUM CHLORIDE 100 MILLILITER(S): 9 INJECTION, SOLUTION INTRAVENOUS at 05:23

## 2018-07-16 RX ADMIN — CEFTRIAXONE 100 GRAM(S): 500 INJECTION, POWDER, FOR SOLUTION INTRAMUSCULAR; INTRAVENOUS at 16:13

## 2018-07-16 RX ADMIN — Medication 30 MILLIGRAM(S): at 22:01

## 2018-07-16 RX ADMIN — Medication 7 UNIT(S): at 17:57

## 2018-07-16 RX ADMIN — Medication 100 MICROGRAM(S): at 05:23

## 2018-07-16 RX ADMIN — ENOXAPARIN SODIUM 40 MILLIGRAM(S): 100 INJECTION SUBCUTANEOUS at 12:00

## 2018-07-16 RX ADMIN — SODIUM CHLORIDE 100 MILLILITER(S): 9 INJECTION, SOLUTION INTRAVENOUS at 16:13

## 2018-07-16 RX ADMIN — Medication 30 MILLIGRAM(S): at 22:40

## 2018-07-16 RX ADMIN — Medication 30 MILLIGRAM(S): at 17:00

## 2018-07-16 RX ADMIN — INSULIN GLARGINE 25 UNIT(S): 100 INJECTION, SOLUTION SUBCUTANEOUS at 22:33

## 2018-07-16 RX ADMIN — Medication 650 MILLIGRAM(S): at 12:30

## 2018-07-16 RX ADMIN — Medication 250 MILLIGRAM(S): at 05:23

## 2018-07-16 RX ADMIN — Medication 7 UNIT(S): at 11:58

## 2018-07-16 RX ADMIN — Medication 30 MILLIGRAM(S): at 16:13

## 2018-07-16 NOTE — PROGRESS NOTE ADULT - SUBJECTIVE AND OBJECTIVE BOX
seen for pyelo/bacteremia    with , feels better but nausea and flank pain persists.  dysuria also persists  ROS otherwise negative     MEDICATIONS  (STANDING):  cefTRIAXone   IVPB 2 Gram(s) IV Intermittent every 24 hours  dextrose 5%. 1000 milliLiter(s) (50 mL/Hr) IV Continuous <Continuous>  dextrose 50% Injectable 12.5 Gram(s) IV Push once  dextrose 50% Injectable 25 Gram(s) IV Push once  dextrose 50% Injectable 25 Gram(s) IV Push once  enoxaparin Injectable 40 milliGRAM(s) SubCutaneous every 24 hours  insulin glargine Injectable (LANTUS) 25 Unit(s) SubCutaneous at bedtime  insulin lispro (HumaLOG) corrective regimen sliding scale   SubCutaneous three times a day before meals  insulin lispro Injectable (HumaLOG) 7 Unit(s) SubCutaneous three times a day before meals  ketorolac   Injectable 30 milliGRAM(s) IV Push every 8 hours  lactated ringers. 1000 milliLiter(s) (100 mL/Hr) IV Continuous <Continuous>  levothyroxine 100 MICROGram(s) Oral daily  saccharomyces boulardii 250 milliGRAM(s) Oral two times a day    MEDICATIONS  (PRN):  acetaminophen   Tablet. 650 milliGRAM(s) Oral every 6 hours PRN mild to mod pain/ fever >38c  dextrose 40% Gel 15 Gram(s) Oral once PRN Blood Glucose LESS THAN 70 milliGRAM(s)/deciliter  glucagon  Injectable 1 milliGRAM(s) IntraMuscular once PRN Glucose LESS THAN 70 milligrams/deciliter  ondansetron Injectable 4 milliGRAM(s) IV Push every 6 hours PRN Nausea and/or Vomiting      Allergies    morphine (Rash)    daches, loss of strength  MISCELLANEOUS:    Vital Signs Last 24 Hrs  T(C): 36.7 (2018 07:32), Max: 38.9 (15 Jul 2018 20:09)  T(F): 98 (2018 07:32), Max: 102 (15 Jul 2018 20:09)  HR: 78 (2018 07:32) (78 - 115)  BP: 108/67 (2018 07:32) (105/59 - 135/64)  BP(mean): --  RR: 19 (2018 07:32) (18 - 19)  SpO2: 95% (2018 07:32) (95% - 98%)    PHYSICAL EXAM:    GENERAL: NAD  CHEST/LUNG: Clear to percussion bilaterally  HEART: Regular rate and rhythm; S1 S2  ABDOMEN: Soft, Nontender, Nondistended; Bowel sounds present  EXTREMITIES:  no edema  : improved left CVA and suprapubic TTP.  NERVOUS SYSTEM:  Alert & Oriented X3, nonfocal  PSYCH: normal mood, appropriate response.    LABS:                        12.3   13.0  )-----------( 163      ( 2018 08:15 )             36.8     07-16    139  |  100  |  8.0  ----------------------------<  225<H>  4.2   |  25.0  |  0.47<L>    Ca    8.8      2018 08:15    TPro  7.8  /  Alb  4.2  /  TBili  0.6  /  DBili  x   /  AST  12  /  ALT  11  /  AlkPhos  77  07-15      Urinalysis Basic - ( 15 Jul 2018 12:52 )    Color: Yellow / Appearance: Slightly Turbid / S.005 / pH: x  Gluc: x / Ketone: Trace  / Bili: Negative / Urobili: Negative mg/dL   Blood: x / Protein: 30 mg/dL / Nitrite: Negative   Leuk Esterase: Moderate / RBC: 3-5 /HPF / WBC >50   Sq Epi: x / Non Sq Epi: Occasional / Bacteria: Moderate        CAPILLARY BLOOD GLUCOSE      POCT Blood Glucose.: 211 mg/dL (2018 08:37)  POCT Blood Glucose.: 220 mg/dL (15 Jul 2018 20:35)  POCT Blood Glucose.: 230 mg/dL (15 Jul 2018 17:26)        RADIOLOGY & ADDITIONAL TESTS:

## 2018-07-16 NOTE — CONSULT NOTE ADULT - ASSESSMENT
62 y/o woman with DM and hypothyroidism was admitted with fever and left flank pain and later had positive blood cultures with GNR.      1-Pyelonephritis:  -UTI involving kidneys as well  -Trend Leukocytosis and fever   -Follow up UC for ID and sensitivity.   -Since no recurrent UTIs can be left on ceftriaxone until we have ID and Sensitivity.   -Abdominal CT didn't show any stone or abnormal findings.     2-Bacteremia:  -follow up blood cultures for ID and sensitivity.   -continue Ceftriaxone 2gm daily  -Repeat blood culture tomorrow.   -will need 2 weeks treatment from the first neg BC.
T2DM- uncontrolled - pt needs diabetic education   with high A1c   needs meter teasch and insulin teach   will check Cpeptide and LIANA ab   Lantus just increased for tonight-     continue supportive care

## 2018-07-16 NOTE — CONSULT NOTE ADULT - SUBJECTIVE AND OBJECTIVE BOX
[facility]  INFECTIOUS DISEASES AND INTERNAL MEDICINE at [place]  =======================================================  Gianluca Osorio MD  Diplomates American Board of Internal Medicine and Infectious Diseases  =======================================================    MRN-[id number]  AMRIK ROBLERO     CC: flank pain with fever/chills    HPI:  64 y/o woman with DM and hypothyroidism was admitted with fever and left flank pain and later had positive blood cultures with GNR.  She has pain in left flank, suprapubic area with dysuria and frequency for 4days. He had UTI once about 3 years ago.   No GI symptoms, no N/V/D/C.       PAST MEDICAL & SURGICAL HISTORY:  Thyroid disorder  HTN (hypertension)  Diabetes  No significant past surgical history    Social Hx: No smoking, ETOH or drugs    FAMILY HISTORY:  No pertinent family history in first degree relatives    Allergies  morphine (Rash)    Antibiotics:  cefTRIAXone   IVPB 2 Gram(s) IV Intermittent every 24 hours     REVIEW OF SYSTEMS:  CONSTITUTIONAL:  + Fever or chills  HEENT:  No diplopia or blurred vision.  No sore throat or runny nose.  CARDIOVASCULAR:  No chest pain or SOB.  RESPIRATORY:  No cough, shortness of breath, PND or orthopnea.  GASTROINTESTINAL:  No nausea, vomiting or diarrhea.  GENITOURINARY:  + dysuria, +frequency and + urgency. No Blood in urine  MUSCULOSKELETAL:  no joint aches, no muscle pain  SKIN:  No change in skin, hair or nails.  NEUROLOGIC:  No paresthesias, fasciculations, seizures or weakness.  PSYCHIATRIC:  No disorder of thought or mood.  ENDOCRINE:  No heat or cold intolerance, polyuria or polydipsia.  HEMATOLOGICAL:  No easy bruising or bleeding.     Physical Exam:  Vital Signs Last 24 Hrs  T(C): 37.2 (2018 15:58), Max: 38.9 (15 Jul 2018 20:09)  T(F): 98.9 (2018 15:58), Max: 102 (15 Jul 2018 20:09)  HR: 84 (2018 15:58) (78 - 115)  BP: 130/70 (2018 15:58) (105/59 - 135/64)  BP(mean): --  RR: 18 (2018 15:58) (18 - 19)  SpO2: 95% (2018 15:58) (95% - 98%)  GEN: NAD  HEENT: normocephalic and atraumatic. EOMI. PERRL.    NECK: Supple.  No lymphadenopathy   LUNGS: Clear to auscultation.  HEART: Regular rate and rhythm without murmur.  ABDOMEN: Soft, mild tenderness on suprapubic area,nondistended.  Positive bowel sounds.    : + left CVA tenderness, R normal   EXTREMITIES: Without any cyanosis, clubbing, rash, lesions or edema.  NEUROLOGIC: grossly intact.  PSYCHIATRIC: Appropriate affect .  SKIN: No ulceration or induration present.    Labs:      139  |  100  |  8.0  ----------------------------<  225<H>  4.2   |  25.0  |  0.47<L>    Ca    8.8      2018 08:15    TPro  7.8  /  Alb  4.2  /  TBili  0.6  /  DBili  x   /  AST  12  /  ALT  11  /  AlkPhos  77  07-15                          12.3   13.0  )-----------( 163      ( 2018 08:15 )             36.8     Urinalysis Basic - ( 15 Jul 2018 12:52 )    Color: Yellow / Appearance: Slightly Turbid / S.005 / pH: x  Gluc: x / Ketone: Trace  / Bili: Negative / Urobili: Negative mg/dL   Blood: x / Protein: 30 mg/dL / Nitrite: Negative   Leuk Esterase: Moderate / RBC: 3-5 /HPF / WBC >50   Sq Epi: x / Non Sq Epi: Occasional / Bacteria: Moderate    LIVER FUNCTIONS - ( 15 Jul 2018 12:52 )  Alb: 4.2 g/dL / Pro: 7.8 g/dL / ALK PHOS: 77 U/L / ALT: 11 U/L / AST: 12 U/L / GGT: x           RECENT CULTURES:  07-15 @ 18:56 .Blood Blood-Venous Blood Culture PCR    Growth in aerobic and anaerobic bottles: Gram Negative Rods  Aerobic Bottle: 9.15 Hours to positivity  Anaerobic Bottle: 9.56 Hours to positivity    07-15 @ 18:55 .Blood Blood-Venous     Growth in aerobic and anaerobic bottles: Gram Negative Rods  Aerobic Bottle: 9.46 Hours to positivity  Anaerobic Bottle: 9.35 Hours to positivity    07-15 @ 12:53 .Urine Clean Catch (Midstream)     >100,000 CFU/ml Gram Negative Rods Identification and susceptibility to  follow.  Culture in progress    All imaging and other data have been reviewed.
HPI:  62 yo F w/ hx Dm2, hypothyroid presents to ER for 3-4 day hx of worsening left flank and suprapubic Tenderness.   subjective fevers at home along with dysuria and chills.  no chest pain/sob. + nausea/vomiting.   pt foudn to have uncontrolled DM  as well       PAST MEDICAL & SURGICAL HISTORY:  Hypothryoidism since  On levothyroxine 0.100mg qd   HTN (hypertension)  T2DM - on glipizde 10 mg bid never on insulin  not checking BS- does not have a meter         FAMILY HISTORY:  2 sons  from DM and HTn ages 24 and 28       SOCIAL HISTORY:  Non smoker No alcohol   REVIEW OF SYSTEMS:    Constitutional: +fever, +chills, no change in weight.    Eyes: No eye swelling,no  blurry vision, no redness, no loss of vision.    Neck: +sore throat  for past week but is improving  Lungs: No shortness of breath, no wheezing, no cough    CV: No chest pain, no palpitations, no pain with walking.    GI: +nausea, +vomiting, no constipation, no diarrhea, lower abdominal pain improving     : No urinary frequency, no blood in urine, no urinary burning, no difficulty voiding.    Musculoskeletal: No muscle pain, no joint pain, no swelling.    Skin: No rash, no infections.    Neurologic: No headaches, no weakness, no burning or pain in feet, no tremor.    Endocrine: No heat intolerance, no cold intolerance, no increased sweating, no shakiness between meals.    Psych: No depression, no anxiety, no trouble concentrating    MEDICATIONS  (STANDING):  cefTRIAXone   IVPB 2 Gram(s) IV Intermittent every 24 hours  dextrose 5%. 1000 milliLiter(s) (50 mL/Hr) IV Continuous <Continuous>  dextrose 50% Injectable 12.5 Gram(s) IV Push once  dextrose 50% Injectable 25 Gram(s) IV Push once  dextrose 50% Injectable 25 Gram(s) IV Push once  enoxaparin Injectable 40 milliGRAM(s) SubCutaneous every 24 hours  insulin glargine Injectable (LANTUS) 25 Unit(s) SubCutaneous at bedtime  insulin lispro (HumaLOG) corrective regimen sliding scale   SubCutaneous three times a day before meals  insulin lispro Injectable (HumaLOG) 7 Unit(s) SubCutaneous three times a day before meals  ketorolac   Injectable 30 milliGRAM(s) IV Push every 8 hours  lactated ringers. 1000 milliLiter(s) (100 mL/Hr) IV Continuous <Continuous>  levothyroxine 100 MICROGram(s) Oral daily  saccharomyces boulardii 250 milliGRAM(s) Oral two times a day    MEDICATIONS  (PRN):  acetaminophen   Tablet. 650 milliGRAM(s) Oral every 6 hours PRN mild to mod pain/ fever >38c  dextrose 40% Gel 15 Gram(s) Oral once PRN Blood Glucose LESS THAN 70 milliGRAM(s)/deciliter  glucagon  Injectable 1 milliGRAM(s) IntraMuscular once PRN Glucose LESS THAN 70 milligrams/deciliter  ondansetron Injectable 4 milliGRAM(s) IV Push every 6 hours PRN Nausea and/or Vomiting      Allergies    morphine (Rash)    Intolerances          CAPILLARY BLOOD GLUCOSE    CAPILLARY BLOOD GLUCOSE      POCT Blood Glucose.: 246 mg/dL (2018 11:56)  POCT Blood Glucose.: 211 mg/dL (2018 08:37)  POCT Blood Glucose.: 220 mg/dL (15 Jul 2018 20:35)  POCT Blood Glucose.: 230 mg/dL (15 Jul 2018 17:26)    POCT Blood Glucose.: 246 mg/dL (2018 11:56)      PHYSICAL EXAM:    Vital Signs Last 24 Hrs  T(C): 37.2 (2018 15:58), Max: 38.9 (15 Jul 2018 20:09)  T(F): 98.9 (2018 15:58), Max: 102 (15 Jul 2018 20:09)  HR: 84 (2018 15:58) (78 - 115)  BP: 130/70 (2018 15:58) (105/59 - 135/64)  BP(mean): --  RR: 18 (2018 15:58) (18 - 19)  SpO2: 95% (2018 15:58) (95% - 98%)    General appearance: Well developed, well nourished.    Eyes: Pupils equal and reactive to light. EOM full. No exophthalmos.    Neck: Trachea midline. No thyroid enlargement.    Lungs: Normal respiratory excursion. Lungs clear.    CV: Regular cardiac rhythm. No murmur. Carotid and pedal pulses intact.      Musculoskeletal: No cyanosis, clubbing, or edema. No pedal lesions.    Skin: Warm and moist. No rash. No acanthosis.    Neuro: Cranial nerves intact. Normal motor and sensory function. DTR's normal.    Psych: Normal affect, good judgement.            LABS:                        12.3   13.0  )-----------( 163      ( 2018 08:15 )             36.8     07-    139  |  100  |  8.0  ----------------------------<  225<H>  4.2   |  25.0  |  0.47<L>    Ca    8.8      2018 08:15    TPro  7.8  /  Alb  4.2  /  TBili  0.6  /  DBili  x   /  AST  12  /  ALT  11  /  AlkPhos  77  15    Urinalysis Basic - ( 15 Jul 2018 12:52 )    Color: Yellow / Appearance: Slightly Turbid / S.005 / pH: x  Gluc: x / Ketone: Trace  / Bili: Negative / Urobili: Negative mg/dL   Blood: x / Protein: 30 mg/dL / Nitrite: Negative   Leuk Esterase: Moderate / RBC: 3-5 /HPF / WBC >50   Sq Epi: x / Non Sq Epi: Occasional / Bacteria: Moderate      LIVER FUNCTIONS - ( 15 Jul 2018 12:52 )  Alb: 4.2 g/dL / Pro: 7.8 g/dL / ALK PHOS: 77 U/L / ALT: 11 U/L / AST: 12 U/L / GGT: x           Hemoglobin A1C, Whole Blood: 11.2 % ( @ 08:15)

## 2018-07-17 LAB
-  AMIKACIN: SIGNIFICANT CHANGE UP
-  AMPICILLIN/SULBACTAM: SIGNIFICANT CHANGE UP
-  AMPICILLIN: SIGNIFICANT CHANGE UP
-  AZTREONAM: SIGNIFICANT CHANGE UP
-  CEFAZOLIN: SIGNIFICANT CHANGE UP
-  CEFEPIME: SIGNIFICANT CHANGE UP
-  CEFOXITIN: SIGNIFICANT CHANGE UP
-  CEFTRIAXONE: SIGNIFICANT CHANGE UP
-  CIPROFLOXACIN: SIGNIFICANT CHANGE UP
-  ERTAPENEM: SIGNIFICANT CHANGE UP
-  GENTAMICIN: SIGNIFICANT CHANGE UP
-  IMIPENEM: SIGNIFICANT CHANGE UP
-  LEVOFLOXACIN: SIGNIFICANT CHANGE UP
-  MEROPENEM: SIGNIFICANT CHANGE UP
-  NITROFURANTOIN: SIGNIFICANT CHANGE UP
-  PIPERACILLIN/TAZOBACTAM: SIGNIFICANT CHANGE UP
-  TIGECYCLINE: SIGNIFICANT CHANGE UP
-  TOBRAMYCIN: SIGNIFICANT CHANGE UP
-  TRIMETHOPRIM/SULFAMETHOXAZOLE: SIGNIFICANT CHANGE UP
ANION GAP SERPL CALC-SCNC: 13 MMOL/L — SIGNIFICANT CHANGE UP (ref 5–17)
BUN SERPL-MCNC: 9 MG/DL — SIGNIFICANT CHANGE UP (ref 8–20)
CALCIUM SERPL-MCNC: 9.2 MG/DL — SIGNIFICANT CHANGE UP (ref 8.6–10.2)
CHLORIDE SERPL-SCNC: 100 MMOL/L — SIGNIFICANT CHANGE UP (ref 98–107)
CHOLEST SERPL-MCNC: 148 MG/DL — SIGNIFICANT CHANGE UP (ref 110–199)
CO2 SERPL-SCNC: 27 MMOL/L — SIGNIFICANT CHANGE UP (ref 22–29)
CREAT SERPL-MCNC: 0.49 MG/DL — LOW (ref 0.5–1.3)
CULTURE RESULTS: SIGNIFICANT CHANGE UP
GLUCOSE BLDC GLUCOMTR-MCNC: 120 MG/DL — HIGH (ref 70–99)
GLUCOSE BLDC GLUCOMTR-MCNC: 232 MG/DL — HIGH (ref 70–99)
GLUCOSE BLDC GLUCOMTR-MCNC: 247 MG/DL — HIGH (ref 70–99)
GLUCOSE BLDC GLUCOMTR-MCNC: 271 MG/DL — HIGH (ref 70–99)
GLUCOSE SERPL-MCNC: 195 MG/DL — HIGH (ref 70–115)
HCT VFR BLD CALC: 38 % — SIGNIFICANT CHANGE UP (ref 37–47)
HDLC SERPL-MCNC: 37 MG/DL — LOW
HGB BLD-MCNC: 12.6 G/DL — SIGNIFICANT CHANGE UP (ref 12–16)
LIPID PNL WITH DIRECT LDL SERPL: 82 MG/DL — SIGNIFICANT CHANGE UP
MCHC RBC-ENTMCNC: 27.6 PG — SIGNIFICANT CHANGE UP (ref 27–31)
MCHC RBC-ENTMCNC: 33.2 G/DL — SIGNIFICANT CHANGE UP (ref 32–36)
MCV RBC AUTO: 83.2 FL — SIGNIFICANT CHANGE UP (ref 81–99)
METHOD TYPE: SIGNIFICANT CHANGE UP
ORGANISM # SPEC MICROSCOPIC CNT: SIGNIFICANT CHANGE UP
PLATELET # BLD AUTO: 150 K/UL — SIGNIFICANT CHANGE UP (ref 150–400)
POTASSIUM SERPL-MCNC: 4.1 MMOL/L — SIGNIFICANT CHANGE UP (ref 3.5–5.3)
POTASSIUM SERPL-SCNC: 4.1 MMOL/L — SIGNIFICANT CHANGE UP (ref 3.5–5.3)
RBC # BLD: 4.57 M/UL — SIGNIFICANT CHANGE UP (ref 4.4–5.2)
RBC # FLD: 12.5 % — SIGNIFICANT CHANGE UP (ref 11–15.6)
SODIUM SERPL-SCNC: 140 MMOL/L — SIGNIFICANT CHANGE UP (ref 135–145)
SPECIMEN SOURCE: SIGNIFICANT CHANGE UP
TOTAL CHOLESTEROL/HDL RATIO MEASUREMENT: 4 RATIO — SIGNIFICANT CHANGE UP (ref 3.3–7.1)
TRIGL SERPL-MCNC: 146 MG/DL — SIGNIFICANT CHANGE UP (ref 10–200)
TSH SERPL-MCNC: 3.79 UIU/ML — SIGNIFICANT CHANGE UP (ref 0.27–4.2)
WBC # BLD: 6.2 K/UL — SIGNIFICANT CHANGE UP (ref 4.8–10.8)
WBC # FLD AUTO: 6.2 K/UL — SIGNIFICANT CHANGE UP (ref 4.8–10.8)

## 2018-07-17 PROCEDURE — 99233 SBSQ HOSP IP/OBS HIGH 50: CPT

## 2018-07-17 PROCEDURE — 99232 SBSQ HOSP IP/OBS MODERATE 35: CPT

## 2018-07-17 RX ORDER — KETOROLAC TROMETHAMINE 30 MG/ML
30 SYRINGE (ML) INJECTION EVERY 8 HOURS
Qty: 0 | Refills: 0 | Status: DISCONTINUED | OUTPATIENT
Start: 2018-07-17 | End: 2018-07-18

## 2018-07-17 RX ORDER — OXYCODONE HYDROCHLORIDE 5 MG/1
5 TABLET ORAL
Qty: 0 | Refills: 0 | Status: DISCONTINUED | OUTPATIENT
Start: 2018-07-17 | End: 2018-07-19

## 2018-07-17 RX ADMIN — INSULIN GLARGINE 25 UNIT(S): 100 INJECTION, SOLUTION SUBCUTANEOUS at 21:21

## 2018-07-17 RX ADMIN — Medication 30 MILLIGRAM(S): at 21:45

## 2018-07-17 RX ADMIN — Medication 4: at 08:12

## 2018-07-17 RX ADMIN — Medication 250 MILLIGRAM(S): at 05:34

## 2018-07-17 RX ADMIN — Medication 30 MILLIGRAM(S): at 13:29

## 2018-07-17 RX ADMIN — Medication 7 UNIT(S): at 16:40

## 2018-07-17 RX ADMIN — Medication 6: at 16:39

## 2018-07-17 RX ADMIN — Medication 30 MILLIGRAM(S): at 05:34

## 2018-07-17 RX ADMIN — Medication 650 MILLIGRAM(S): at 03:07

## 2018-07-17 RX ADMIN — Medication 30 MILLIGRAM(S): at 13:40

## 2018-07-17 RX ADMIN — CEFTRIAXONE 100 GRAM(S): 500 INJECTION, POWDER, FOR SOLUTION INTRAMUSCULAR; INTRAVENOUS at 15:25

## 2018-07-17 RX ADMIN — Medication 100 MICROGRAM(S): at 05:34

## 2018-07-17 RX ADMIN — Medication 30 MILLIGRAM(S): at 00:00

## 2018-07-17 RX ADMIN — ENOXAPARIN SODIUM 40 MILLIGRAM(S): 100 INJECTION SUBCUTANEOUS at 11:23

## 2018-07-17 RX ADMIN — Medication 30 MILLIGRAM(S): at 21:21

## 2018-07-17 RX ADMIN — Medication 250 MILLIGRAM(S): at 17:12

## 2018-07-17 RX ADMIN — SODIUM CHLORIDE 100 MILLILITER(S): 9 INJECTION, SOLUTION INTRAVENOUS at 11:24

## 2018-07-17 RX ADMIN — Medication 7 UNIT(S): at 08:12

## 2018-07-17 RX ADMIN — Medication 7 UNIT(S): at 11:23

## 2018-07-17 RX ADMIN — Medication 650 MILLIGRAM(S): at 03:40

## 2018-07-17 RX ADMIN — SODIUM CHLORIDE 100 MILLILITER(S): 9 INJECTION, SOLUTION INTRAVENOUS at 23:16

## 2018-07-17 RX ADMIN — SODIUM CHLORIDE 100 MILLILITER(S): 9 INJECTION, SOLUTION INTRAVENOUS at 02:18

## 2018-07-17 NOTE — PROGRESS NOTE ADULT - SUBJECTIVE AND OBJECTIVE BOX
CC: flank pain with fever/chills    HPI:  64 yo F w/ hx Dm2, hypothyroid presents to ER on 7/15 with 3-4 day hx of worsening left flank and suprapubic Tenderness. Patient also reports subjective fevers at home along with dysuria and chills. Was also complaing of N/V. Denies chest chest pain/sob. Blood cultures positive for GNR. CT showed no obstruction/ or stone.     Interval events: Was seen in consult by ID and endocrinology.      Patient seen and examined at bedside.  Patient only speaks [place]. Hospital  at bedside. Patient still complaining of chills and subjective fevers overnight. Reports still having left flank pain but slightly improved from yesterday. Still with some dysuria. Denies N/V/D, hematuria, chest pain, SOB, abdominal pain.     Allergies  morphine (Rash)     REVIEW OF SYSTEMS:  CONSTITUTIONAL:  + Fever or chills  HEENT:  No diplopia or blurred vision.  No sore throat or runny nose.  CARDIOVASCULAR:  No chest pain or SOB.  RESPIRATORY:  No cough, shortness of breath, PND or orthopnea.  GASTROINTESTINAL:  No nausea, vomiting or diarrhea.  GENITOURINARY:  + dysuria, +frequency and + urgency. No Blood in urine  MUSCULOSKELETAL:  no joint aches, no muscle pain  NEUROLOGIC:  No paresthesias, fasciculations, seizures or weakness.    Vital Signs Last 24 Hrs  T(C): 37.1 (2018 07:54), Max: 37.8 (2018 03:35)  T(F): 98.8 (2018 07:54), Max: 100.1 (2018 03:35)  HR: 84 (2018 07:54) (84 - 96)  BP: 108/62 (2018 07:54) (108/62 - 141/72)  BP(mean): --  RR: 18 (2018 07:54) (18 - 20)  SpO2: 95% (2018 07:54) (94% - 100%)    PHYSICAL EXAM  GEN: No acute distress  HEENT: normocephalic and atraumatic  NECK: Supple  LUNGS: Clear to auscultation. No wheezes or rales.  HEART: Regular rate and rhythm without murmur.  ABDOMEN: Soft, mild tenderness to suprapubic area, nondistended, +BS  : + left CVA tenderness, R normal   EXTREMITIES: Without any cyanosis, clubbing, rash, lesions or edema.  SKIN: No jaundice, cyanosis.     Labs:                        12.6   6.2   )-----------( 150      ( 2018 08:36 )             38.0       07-    139  |  100  |  8.0  ----------------------------<  225<H>  4.2   |  25.0  |  0.47<L>    Ca    8.8      2018 08:15    TPro  7.8  /  Alb  4.2  /  TBili  0.6  /  DBili  x   /  AST  12  /  ALT  11  /  AlkPhos  77  07-15    Urinalysis Basic - ( 15 Jul 2018 12:52 )    Color: Yellow / Appearance: Slightly Turbid / S.005 / pH: x  Gluc: x / Ketone: Trace  / Bili: Negative / Urobili: Negative mg/dL   Blood: x / Protein: 30 mg/dL / Nitrite: Negative   Leuk Esterase: Moderate / RBC: 3-5 /HPF / WBC >50   Sq Epi: x / Non Sq Epi: Occasional / Bacteria: Moderate    LIVER FUNCTIONS - ( 15 Jul 2018 12:52 )  Alb: 4.2 g/dL / Pro: 7.8 g/dL / ALK PHOS: 77 U/L / ALT: 11 U/L / AST: 12 U/L / GGT: x           RECENT CULTURES:  07-15 @ 18:56 .Blood Blood-Venous Blood Culture PCR    Growth in aerobic and anaerobic bottles: Gram Negative Rods  Aerobic Bottle: 9.15 Hours to positivity  Anaerobic Bottle: 9.56 Hours to positivity    07-15 @ 18:55 .Blood Blood-Venous     Growth in aerobic and anaerobic bottles: Gram Negative Rods  Aerobic Bottle: 9.46 Hours to positivity  Anaerobic Bottle: 9.35 Hours to positivity    07-15 @ 12:53 .Urine Clean Catch (Midstream)     >100,000 CFU/ml Gram Negative Rods Identification and susceptibility to  follow.  Culture in progress    All imaging and other data have been reviewed.

## 2018-07-17 NOTE — PROGRESS NOTE ADULT - SUBJECTIVE AND OBJECTIVE BOX
[facility]  INFECTIOUS DISEASES AND INTERNAL MEDICINE at [place]  =======================================================  Gianluca Osorio MD  Diplomates American Board of Internal Medicine and Infectious Diseases  =======================================================    [id number]  AMRIK ROBLERO     Follow up:    64 y/o woman with DM and hypothyroidism was admitted with fever and left flank pain and later had positive blood and urine cultures for a sensitive E coli.   No new complaint today. fever is trending down. Flank pain slightly better.       PAST MEDICAL & SURGICAL HISTORY:  Thyroid disorder  HTN (hypertension)  Diabetes  No significant past surgical history    Social Hx: No smoking, ETOH or drugs    FAMILY HISTORY:  No pertinent family history in first degree relatives    Allergies  morphine (Rash)    Antibiotics:  cefTRIAXone   IVPB 2 Gram(s) IV Intermittent every 24 hours     REVIEW OF SYSTEMS:  CONSTITUTIONAL:  + Fever or chills  HEENT:  No diplopia or blurred vision.  No sore throat or runny nose.  CARDIOVASCULAR:  No chest pain or SOB.  RESPIRATORY:  No cough, shortness of breath, PND or orthopnea.  GASTROINTESTINAL:  No nausea, vomiting or diarrhea.  GENITOURINARY:  + dysuria, +frequency and + urgency. No Blood in urine  MUSCULOSKELETAL:  no joint aches, no muscle pain  SKIN:  No change in skin, hair or nails.  NEUROLOGIC:  No paresthesias, fasciculations, seizures or weakness.  PSYCHIATRIC:  No disorder of thought or mood.  ENDOCRINE:  No heat or cold intolerance, polyuria or polydipsia.  HEMATOLOGICAL:  No easy bruising or bleeding.     Physical Exam:  Vital Signs Last 24 Hrs  T(C): 37.1 (17 Jul 2018 07:54), Max: 37.8 (17 Jul 2018 03:35)  T(F): 98.8 (17 Jul 2018 07:54), Max: 100.1 (17 Jul 2018 03:35)  HR: 84 (17 Jul 2018 07:54) (84 - 96)  BP: 108/62 (17 Jul 2018 07:54) (108/62 - 141/72)  RR: 18 (17 Jul 2018 07:54) (18 - 20)  SpO2: 95% (17 Jul 2018 07:54) (94% - 100%)  GEN: NAD  HEENT: normocephalic and atraumatic. EOMI. PERRL.    NECK: Supple.  No lymphadenopathy   LUNGS: Clear to auscultation.  HEART: Regular rate and rhythm without murmur.  ABDOMEN: Soft, mild tenderness on suprapubic area,nondistended.  Positive bowel sounds.    : + left CVA tenderness, R normal   EXTREMITIES: Without any cyanosis, clubbing, rash, lesions or edema.  NEUROLOGIC: grossly intact.  PSYCHIATRIC: Appropriate affect .  SKIN: No ulceration or induration present.    Labs:  07-17    140  |  100  |  9.0  ----------------------------<  195<H>  4.1   |  27.0  |  0.49<L>    Ca    9.2      17 Jul 2018 08:36                        12.6   6.2   )-----------( 150      ( 17 Jul 2018 08:36 )             38.0     RECENT CULTURES:  07-15 @ 18:56 .Blood Blood-Venous Escherichia coli  Blood Culture PCR    Growth in aerobic and anaerobic bottles: Escherichia coli  Aerobic Bottle: 9.15 Hours to positivity  Anaerobic Bottle: 9.56 Hours to positivity    07-15 @ 18:55 .Blood Blood-Venous Escherichia coli    Growth in aerobic and anaerobic bottles: Escherichia coli  Aerobic Bottle: 9.46 Hours to positivity  Anaerobic Bottle: 9.35 Hours to positivity    07-15 @ 12:53 .Urine Clean Catch (Midstream) Escherichia coli    >100,000 CFU/ml Escherichia coli    All imaging and other data have been reviewed.

## 2018-07-18 DIAGNOSIS — Z71.89 OTHER SPECIFIED COUNSELING: ICD-10-CM

## 2018-07-18 PROBLEM — E11.9 TYPE 2 DIABETES MELLITUS WITHOUT COMPLICATIONS: Chronic | Status: ACTIVE | Noted: 2018-04-25

## 2018-07-18 PROBLEM — I10 ESSENTIAL (PRIMARY) HYPERTENSION: Chronic | Status: ACTIVE | Noted: 2018-04-25

## 2018-07-18 LAB
ANION GAP SERPL CALC-SCNC: 9 MMOL/L — SIGNIFICANT CHANGE UP (ref 5–17)
BUN SERPL-MCNC: 14 MG/DL — SIGNIFICANT CHANGE UP (ref 8–20)
C PEPTIDE SERPL-MCNC: 1.5 NG/ML — SIGNIFICANT CHANGE UP (ref 0.9–7.1)
CALCIUM SERPL-MCNC: 9.3 MG/DL — SIGNIFICANT CHANGE UP (ref 8.6–10.2)
CHLORIDE SERPL-SCNC: 101 MMOL/L — SIGNIFICANT CHANGE UP (ref 98–107)
CO2 SERPL-SCNC: 26 MMOL/L — SIGNIFICANT CHANGE UP (ref 22–29)
CREAT SERPL-MCNC: 0.53 MG/DL — SIGNIFICANT CHANGE UP (ref 0.5–1.3)
GLUCOSE BLDC GLUCOMTR-MCNC: 161 MG/DL — HIGH (ref 70–99)
GLUCOSE BLDC GLUCOMTR-MCNC: 174 MG/DL — HIGH (ref 70–99)
GLUCOSE BLDC GLUCOMTR-MCNC: 207 MG/DL — HIGH (ref 70–99)
GLUCOSE BLDC GLUCOMTR-MCNC: 234 MG/DL — HIGH (ref 70–99)
GLUCOSE SERPL-MCNC: 259 MG/DL — HIGH (ref 70–115)
HCT VFR BLD CALC: 36.5 % — LOW (ref 37–47)
HGB BLD-MCNC: 12 G/DL — SIGNIFICANT CHANGE UP (ref 12–16)
MCHC RBC-ENTMCNC: 27.4 PG — SIGNIFICANT CHANGE UP (ref 27–31)
MCHC RBC-ENTMCNC: 32.9 G/DL — SIGNIFICANT CHANGE UP (ref 32–36)
MCV RBC AUTO: 83.3 FL — SIGNIFICANT CHANGE UP (ref 81–99)
PLATELET # BLD AUTO: 168 K/UL — SIGNIFICANT CHANGE UP (ref 150–400)
POTASSIUM SERPL-MCNC: 4.6 MMOL/L — SIGNIFICANT CHANGE UP (ref 3.5–5.3)
POTASSIUM SERPL-SCNC: 4.6 MMOL/L — SIGNIFICANT CHANGE UP (ref 3.5–5.3)
RBC # BLD: 4.38 M/UL — LOW (ref 4.4–5.2)
RBC # FLD: 12.4 % — SIGNIFICANT CHANGE UP (ref 11–15.6)
SODIUM SERPL-SCNC: 136 MMOL/L — SIGNIFICANT CHANGE UP (ref 135–145)
WBC # BLD: 5.5 K/UL — SIGNIFICANT CHANGE UP (ref 4.8–10.8)
WBC # FLD AUTO: 5.5 K/UL — SIGNIFICANT CHANGE UP (ref 4.8–10.8)

## 2018-07-18 PROCEDURE — 99232 SBSQ HOSP IP/OBS MODERATE 35: CPT

## 2018-07-18 RX ORDER — CIPROFLOXACIN LACTATE 400MG/40ML
750 VIAL (ML) INTRAVENOUS
Qty: 0 | Refills: 0 | Status: DISCONTINUED | OUTPATIENT
Start: 2018-07-18 | End: 2018-07-19

## 2018-07-18 RX ORDER — INSULIN LISPRO 100/ML
10 VIAL (ML) SUBCUTANEOUS
Qty: 0 | Refills: 0 | Status: DISCONTINUED | OUTPATIENT
Start: 2018-07-18 | End: 2018-07-19

## 2018-07-18 RX ORDER — INSULIN GLARGINE 100 [IU]/ML
30 INJECTION, SOLUTION SUBCUTANEOUS AT BEDTIME
Qty: 0 | Refills: 0 | Status: DISCONTINUED | OUTPATIENT
Start: 2018-07-18 | End: 2018-07-19

## 2018-07-18 RX ADMIN — Medication 7 UNIT(S): at 07:59

## 2018-07-18 RX ADMIN — Medication 4: at 11:52

## 2018-07-18 RX ADMIN — Medication 750 MILLIGRAM(S): at 17:02

## 2018-07-18 RX ADMIN — SODIUM CHLORIDE 100 MILLILITER(S): 9 INJECTION, SOLUTION INTRAVENOUS at 21:33

## 2018-07-18 RX ADMIN — ENOXAPARIN SODIUM 40 MILLIGRAM(S): 100 INJECTION SUBCUTANEOUS at 11:53

## 2018-07-18 RX ADMIN — Medication 650 MILLIGRAM(S): at 23:27

## 2018-07-18 RX ADMIN — Medication 100 MICROGRAM(S): at 05:10

## 2018-07-18 RX ADMIN — Medication 2: at 17:01

## 2018-07-18 RX ADMIN — Medication 7 UNIT(S): at 11:51

## 2018-07-18 RX ADMIN — Medication 650 MILLIGRAM(S): at 23:50

## 2018-07-18 RX ADMIN — Medication 650 MILLIGRAM(S): at 15:00

## 2018-07-18 RX ADMIN — Medication 30 MILLIGRAM(S): at 05:10

## 2018-07-18 RX ADMIN — Medication 650 MILLIGRAM(S): at 15:30

## 2018-07-18 RX ADMIN — Medication 250 MILLIGRAM(S): at 17:24

## 2018-07-18 RX ADMIN — Medication 10 UNIT(S): at 17:01

## 2018-07-18 RX ADMIN — Medication 250 MILLIGRAM(S): at 05:10

## 2018-07-18 RX ADMIN — Medication 4: at 07:59

## 2018-07-18 RX ADMIN — INSULIN GLARGINE 30 UNIT(S): 100 INJECTION, SOLUTION SUBCUTANEOUS at 21:32

## 2018-07-18 NOTE — PROGRESS NOTE ADULT - PROBLEM SELECTOR PLAN 1
pain control, anti emetics  swithc to cipro x 2 weeks
c/w ceftriaxone  pain control, anti emetics  f/u cultures

## 2018-07-18 NOTE — PROGRESS NOTE ADULT - SUBJECTIVE AND OBJECTIVE BOX
seen for pyelo/bacteremia    seen with   improved pain, dysuria.  ros otherwise negative    MEDICATIONS  (STANDING):  ciprofloxacin     Tablet 750 milliGRAM(s) Oral two times a day  dextrose 5%. 1000 milliLiter(s) (50 mL/Hr) IV Continuous <Continuous>  dextrose 50% Injectable 12.5 Gram(s) IV Push once  dextrose 50% Injectable 25 Gram(s) IV Push once  dextrose 50% Injectable 25 Gram(s) IV Push once  enoxaparin Injectable 40 milliGRAM(s) SubCutaneous every 24 hours  insulin glargine Injectable (LANTUS) 25 Unit(s) SubCutaneous at bedtime  insulin lispro (HumaLOG) corrective regimen sliding scale   SubCutaneous three times a day before meals  insulin lispro Injectable (HumaLOG) 7 Unit(s) SubCutaneous three times a day before meals  ketorolac   Injectable 30 milliGRAM(s) IV Push every 8 hours  lactated ringers. 1000 milliLiter(s) (100 mL/Hr) IV Continuous <Continuous>  levothyroxine 100 MICROGram(s) Oral daily  saccharomyces boulardii 250 milliGRAM(s) Oral two times a day    MEDICATIONS  (PRN):  acetaminophen   Tablet. 650 milliGRAM(s) Oral every 6 hours PRN mild to mod pain/ fever >38c  dextrose 40% Gel 15 Gram(s) Oral once PRN Blood Glucose LESS THAN 70 milliGRAM(s)/deciliter  glucagon  Injectable 1 milliGRAM(s) IntraMuscular once PRN Glucose LESS THAN 70 milligrams/deciliter  ondansetron Injectable 4 milliGRAM(s) IV Push every 6 hours PRN Nausea and/or Vomiting  oxyCODONE    IR 5 milliGRAM(s) Oral four times a day PRN Severe Pain (7 - 10)      Allergies    morphine (Rash)    Vital Signs Last 24 Hrs  T(C): 37.3 (18 Jul 2018 07:48), Max: 37.4 (17 Jul 2018 16:12)  T(F): 99.1 (18 Jul 2018 07:48), Max: 99.4 (17 Jul 2018 16:12)  HR: 88 (18 Jul 2018 07:48) (78 - 88)  BP: 114/74 (18 Jul 2018 07:48) (114/74 - 126/75)  BP(mean): --  RR: 18 (18 Jul 2018 07:48) (18 - 18)  SpO2: 96% (18 Jul 2018 07:48) (96% - 97%)    PHYSICAL EXAM:    GENERAL: NAD, up in bed, more upbeat and comfortable  CHEST/LUNG: Clear to percussion bilaterally  HEART: Regular rate and rhythm; S1 S2  ABDOMEN: Soft, Nontender, Nondistended; Bowel sounds present  EXTREMITIES: no edema  : minimal left cva/flank TTP  NERVOUS SYSTEM:  Alert & Oriented X3, nonfocal    LABS:                        12.0   5.5   )-----------( 168      ( 18 Jul 2018 11:01 )             36.5     07-18    136  |  101  |  14.0  ----------------------------<  259<H>  4.6   |  26.0  |  0.53    Ca    9.3      18 Jul 2018 07:52            CAPILLARY BLOOD GLUCOSE      POCT Blood Glucose.: 207 mg/dL (18 Jul 2018 11:50)  POCT Blood Glucose.: 234 mg/dL (18 Jul 2018 07:43)  POCT Blood Glucose.: 247 mg/dL (17 Jul 2018 21:19)  POCT Blood Glucose.: 271 mg/dL (17 Jul 2018 16:25)        RADIOLOGY & ADDITIONAL TESTS:

## 2018-07-18 NOTE — PROGRESS NOTE ADULT - SUBJECTIVE AND OBJECTIVE BOX
INTERVAL HPI/OVERNIGHT EVENTS:  follow up T2DM   MEDICATIONS  (STANDING):  ciprofloxacin     Tablet 750 milliGRAM(s) Oral two times a day  dextrose 5%. 1000 milliLiter(s) (50 mL/Hr) IV Continuous <Continuous>  dextrose 50% Injectable 12.5 Gram(s) IV Push once  dextrose 50% Injectable 25 Gram(s) IV Push once  dextrose 50% Injectable 25 Gram(s) IV Push once  enoxaparin Injectable 40 milliGRAM(s) SubCutaneous every 24 hours  insulin glargine Injectable (LANTUS) 30 Unit(s) SubCutaneous at bedtime  insulin lispro (HumaLOG) corrective regimen sliding scale   SubCutaneous three times a day before meals  insulin lispro Injectable (HumaLOG) 10 Unit(s) SubCutaneous three times a day before meals  lactated ringers. 1000 milliLiter(s) (100 mL/Hr) IV Continuous <Continuous>  levothyroxine 100 MICROGram(s) Oral daily  saccharomyces boulardii 250 milliGRAM(s) Oral two times a day    MEDICATIONS  (PRN):  acetaminophen   Tablet. 650 milliGRAM(s) Oral every 6 hours PRN mild to mod pain/ fever >38c  dextrose 40% Gel 15 Gram(s) Oral once PRN Blood Glucose LESS THAN 70 milliGRAM(s)/deciliter  glucagon  Injectable 1 milliGRAM(s) IntraMuscular once PRN Glucose LESS THAN 70 milligrams/deciliter  ondansetron Injectable 4 milliGRAM(s) IV Push every 6 hours PRN Nausea and/or Vomiting  oxyCODONE    IR 5 milliGRAM(s) Oral four times a day PRN Severe Pain (7 - 10)      Allergies    morphine (Rash)    Intolerances        Review of systems:    Vital Signs Last 24 Hrs  T(C): 37.2 (18 Jul 2018 16:30), Max: 37.4 (18 Jul 2018 15:50)  T(F): 98.9 (18 Jul 2018 16:30), Max: 99.4 (18 Jul 2018 15:50)  HR: 87 (18 Jul 2018 16:30) (87 - 88)  BP: 141/73 (18 Jul 2018 16:30) (114/74 - 141/73)  BP(mean): --  RR: 18 (18 Jul 2018 16:30) (18 - 18)  SpO2: 95% (18 Jul 2018 16:30) (95% - 96%)    PHYSICAL EXAM:      Constitutional: NAD, well-groomed, well-developed  HEENT: PERRLA, EOMI, no exophalmos  Neck: No LAD, No JVD, trachea midline, no thyroid enlargement  Back: Normal spine flexure, No CVA tenderness  Respiratory: CTAB  Cardiovascular: S1 and S2, RRR, no M/G/R  Gastrointestinal: BS+, soft, no organomeglag or mass  Extremities: No peripheral edema, no pedal lesions  Vascular: 2+ peripheral pulses  Neurological: A/O x 3, no focal deficits  Psychiatric: Normal mood, normal affect  Musculoskeletal: 5/5 strength b/l upper and lower extremities  Skin: No rashes, no acanthosis        LABS:                        12.0   5.5   )-----------( 168      ( 18 Jul 2018 11:01 )             36.5     07-18    136  |  101  |  14.0  ----------------------------<  259<H>  4.6   |  26.0  |  0.53    Ca    9.3      18 Jul 2018 07:52            CAPILLARY BLOOD GLUCOSE    CAPILLARY BLOOD GLUCOSE      POCT Blood Glucose.: 174 mg/dL (18 Jul 2018 16:22)  POCT Blood Glucose.: 207 mg/dL (18 Jul 2018 11:50)  POCT Blood Glucose.: 234 mg/dL (18 Jul 2018 07:43)  POCT Blood Glucose.: 247 mg/dL (17 Jul 2018 21:19)    RADIOLOGY & ADDITIONAL TESTS: INTERVAL HPI/OVERNIGHT EVENTS:  follow up T2DM   pt josé luis better- has been practicing injections   MEDICATIONS  (STANDING):  ciprofloxacin     Tablet 750 milliGRAM(s) Oral two times a day  dextrose 5%. 1000 milliLiter(s) (50 mL/Hr) IV Continuous <Continuous>  dextrose 50% Injectable 12.5 Gram(s) IV Push once  dextrose 50% Injectable 25 Gram(s) IV Push once  dextrose 50% Injectable 25 Gram(s) IV Push once  enoxaparin Injectable 40 milliGRAM(s) SubCutaneous every 24 hours  insulin glargine Injectable (LANTUS) 30 Unit(s) SubCutaneous at bedtime  insulin lispro (HumaLOG) corrective regimen sliding scale   SubCutaneous three times a day before meals  insulin lispro Injectable (HumaLOG) 10 Unit(s) SubCutaneous three times a day before meals  lactated ringers. 1000 milliLiter(s) (100 mL/Hr) IV Continuous <Continuous>  levothyroxine 100 MICROGram(s) Oral daily  saccharomyces boulardii 250 milliGRAM(s) Oral two times a day    MEDICATIONS  (PRN):  acetaminophen   Tablet. 650 milliGRAM(s) Oral every 6 hours PRN mild to mod pain/ fever >38c  dextrose 40% Gel 15 Gram(s) Oral once PRN Blood Glucose LESS THAN 70 milliGRAM(s)/deciliter  glucagon  Injectable 1 milliGRAM(s) IntraMuscular once PRN Glucose LESS THAN 70 milligrams/deciliter  ondansetron Injectable 4 milliGRAM(s) IV Push every 6 hours PRN Nausea and/or Vomiting  oxyCODONE    IR 5 milliGRAM(s) Oral four times a day PRN Severe Pain (7 - 10)      Allergies    morphine (Rash)    Intolerances        Review of systems: occasional fever   appetite is variable   No dyspnea no cp     Vital Signs Last 24 Hrs  T(C): 37.2 (18 Jul 2018 16:30), Max: 37.4 (18 Jul 2018 15:50)  T(F): 98.9 (18 Jul 2018 16:30), Max: 99.4 (18 Jul 2018 15:50)  HR: 87 (18 Jul 2018 16:30) (87 - 88)  BP: 141/73 (18 Jul 2018 16:30) (114/74 - 141/73)  BP(mean): --  RR: 18 (18 Jul 2018 16:30) (18 - 18)  SpO2: 95% (18 Jul 2018 16:30) (95% - 96%)    PHYSICAL EXAM:      Constitutional: NAD, well-groomed, well-developed  HEENT: PERRLA, EOMI, no exophalmos  Neck: No LAD, No JVD, trachea midline, no thyroid enlargement  Back: Normal spine flexure, No CVA tenderness  Respiratory: CTAB  Cardiovascular: S1 and S2, RRR, no M/G/R  Gastrointestinal: BS+, soft, no organomeglag or mass  Extremities: No peripheral edema, no pedal lesions    Neurological: A/O x 3, no focal deficits  Psychiatric: Normal mood, normal affect          LABS:                        12.0   5.5   )-----------( 168      ( 18 Jul 2018 11:01 )             36.5     07-18    136  |  101  |  14.0  ----------------------------<  259<H>  4.6   |  26.0  |  0.53    Ca    9.3      18 Jul 2018 07:52            CAPILLARY BLOOD GLUCOSE    CAPILLARY BLOOD GLUCOSE      POCT Blood Glucose.: 174 mg/dL (18 Jul 2018 16:22)  POCT Blood Glucose.: 207 mg/dL (18 Jul 2018 11:50)  POCT Blood Glucose.: 234 mg/dL (18 Jul 2018 07:43)  POCT Blood Glucose.: 247 mg/dL (17 Jul 2018 21:19)    RADIOLOGY & ADDITIONAL TESTS:

## 2018-07-18 NOTE — PROGRESS NOTE ADULT - SUBJECTIVE AND OBJECTIVE BOX
[facility]  INFECTIOUS DISEASES AND INTERNAL MEDICINE at [place]  =======================================================  Gianluca Osorio MD  Diplomates American Board of Internal Medicine and Infectious Diseases  =======================================================    [id number]  AMRIK ROBLERO     Follow up:  64 y/o woman with DM and hypothyroidism was admitted with fever and left flank pain and later had positive blood and urine cultures for a sensitive E coli.   Now afebrile and feels back to her normal. Appetite is good, back pain is better.     PAST MEDICAL & SURGICAL HISTORY:  Thyroid disorder  HTN (hypertension)  Diabetes  No significant past surgical history    Social Hx: No smoking, ETOH or drugs    FAMILY HISTORY:  No pertinent family history in first degree relatives    Allergies  morphine (Rash)    Antibiotics:  cefTRIAXone   IVPB 2 Gram(s) IV Intermittent every 24 hours     REVIEW OF SYSTEMS:  CONSTITUTIONAL:  no Fever or chills  HEENT:  No diplopia or blurred vision.  No sore throat or runny nose.  CARDIOVASCULAR:  No chest pain or SOB.  RESPIRATORY:  No cough, shortness of breath, PND or orthopnea.  GASTROINTESTINAL:  No nausea, vomiting or diarrhea.  GENITOURINARY:  + dysuria, +frequency and + urgency have improved   MUSCULOSKELETAL:  no joint aches, no muscle pain  SKIN:  No change in skin, hair or nails.  NEUROLOGIC:  No paresthesias, fasciculations, seizures or weakness.  PSYCHIATRIC:  No disorder of thought or mood.  ENDOCRINE:  No heat or cold intolerance, polyuria or polydipsia.  HEMATOLOGICAL:  No easy bruising or bleeding.     Physical Exam:  Vital Signs Last 24 Hrs  T(C): 37.3 (18 Jul 2018 07:48), Max: 37.4 (17 Jul 2018 16:12)  T(F): 99.1 (18 Jul 2018 07:48), Max: 99.4 (17 Jul 2018 16:12)  HR: 88 (18 Jul 2018 07:48) (78 - 88)  BP: 114/74 (18 Jul 2018 07:48) (114/74 - 126/75)  RR: 18 (18 Jul 2018 07:48) (18 - 18)  SpO2: 96% (18 Jul 2018 07:48) (96% - 97%)  GEN: NAD  HEENT: normocephalic and atraumatic. EOMI. PERRL.    NECK: Supple.  No lymphadenopathy   LUNGS: Clear to auscultation.  HEART: Regular rate and rhythm without murmur.  ABDOMEN: Soft, no tenderness on suprapubic area, nondistended.  Positive bowel sounds.    : + left CVA tenderness improved, mild now, R normal   EXTREMITIES: Without any cyanosis, clubbing, rash, lesions or edema.  NEUROLOGIC: grossly intact.  PSYCHIATRIC: Appropriate affect .  SKIN: No ulceration or induration present.    Labs:  07-17    140  |  100  |  9.0  ----------------------------<  195<H>  4.1   |  27.0  |  0.49<L>    Ca    9.2      17 Jul 2018 08:36                        12.6   6.2   )-----------( 150      ( 17 Jul 2018 08:36 )             38.0     RECENT CULTURES:  07-15 @ 18:56 .Blood Blood-Venous Escherichia coli  Blood Culture PCR    Growth in aerobic and anaerobic bottles: Escherichia coli  Aerobic Bottle: 9.15 Hours to positivity  Anaerobic Bottle: 9.56 Hours to positivity    07-15 @ 18:55 .Blood Blood-Venous Escherichia coli    Growth in aerobic and anaerobic bottles: Escherichia coli  Aerobic Bottle: 9.46 Hours to positivity  Anaerobic Bottle: 9.35 Hours to positivity    07-15 @ 12:53 .Urine Clean Catch (Midstream) Escherichia coli    >100,000 CFU/ml Escherichia coli    All imaging and other data have been reviewed.

## 2018-07-18 NOTE — PROGRESS NOTE ADULT - ASSESSMENT
Interviewed and evaluated
62 yo F w/ hx Dm2, hypothyroid presents to ER for 3-4 day hx of worsening left flank and suprapubic Tenderness.   in ER, meets sepsis criteria due to cystitis/pyelonephritis along with bacteremia.
64 y/o woman with DM and hypothyroidism was admitted with fever and left flank pain and later had positive blood cultures with GNR.      1-Pyelonephritis:  -UTI involving kidneys as well  -Normalized Leukocytosis and fever downtrending   -continue ceftriaxone 2gm daily   -Abdominal CT didn't show any stone or abnormal findings.     2-Bacteremia:  -Repeat blood cultures are pending.   -continue Ceftriaxone 2gm daily  -will need 2 weeks treatment from the first neg BC.
64 y/o woman with DM and hypothyroidism was admitted with fever and left flank pain and later had positive blood cultures with a ss Ecoli.     1-Pyelonephritis:  -UTI involving kidneys as well  -Normalized Leukocytosis and no fever  -continue ceftriaxone 2gm daily   -Abdominal CT didn't show any stone or abnormal findings.     2-Bacteremia:  -Repeat blood culture neg from 7/16  -continue Ceftriaxone 2gm daily  -will need 2 weeks treatment. last day would be 7/29  -When ready for discharge can be switched to PO ciprofloxacin 750mg q12h to complete 2 weeks.
64 yo F w/ hx Dm2, hypothyroid presents to ER for 3-4 day hx of worsening left flank and suprapubic Tenderness.   in ER, meets sepsis criteria due to cystitis/pyelonephritis along with bacteremia.
Patient is a 62 yo F w/ hx Dm2, hypothyroid presents to ER on 7/15 with 3-4 day hx of worsening left flank and suprapubic Tenderness. Patient also reports subjective fevers at home along with dysuria and chills. Was also complaining of N/V. Denies chest chest pain/sob. UA positive for pyelonephritis. Blood cultures positive for GNR. CT was negative for any obstruction.      Problem/Plan - 1: Pyelonephritis  - flank pain improving, leukocytosis resolve (6.2 this morning)  - will c/w ceftriaxone 2 gm daily  -Pain control, anti emetics  -Follow up UC for ID and sensitivity.       Problem/Plan - 2: Bacteremia:  - Will Continue with Ceftriaxone 2gm daily  - Follow up blood cultures for ID and sensitivity   - Repeat blood cultures drawn this morning 7/17. Pending results.  - ID consult appreciated - will need 2 weeks treatment from the first neg BC.     Problem/Plan - 4: diabetes mellitus with hyperglycemia, without long-term current use of insulin.    - hga1c 11.2--uncontrolled  -Endocrine consult appreciated, recs implemented increase lantus, add premeal and c/w raiss  -Needs diabetic education, pt not on insulin at home though will like require insulin on discharge  -Hold home glipizide  -C-peptide collected. Pending results. Will f/u.      Problem/Plan - 5: Acquired hypothyroidism.    - synthroid 100 mcg qAM  - TSH pending results. Will f/u.    Problem/Plan - 6: DVT prophylaxis  -Lovenox 40 mg SQ QD
T2DM- now on insulin - Await Cpeptide   will continue insuliln RX   Pt injecting but needs diabetic education regarding insulin type and tesetign with meter

## 2018-07-18 NOTE — PROGRESS NOTE ADULT - PROBLEM SELECTOR PLAN 4
hga1c 11.2--uncontrolled  DM education,  endocrine consulted  increase lantus, add premeal and c/w raiss  hold home glipizide  check lipid panel
hga1c 11.2--uncontrolled  DM education,  endocrine consulted  increase lantus, add premeal and c/w raiss  hold home glipizide  check lipid panel

## 2018-07-18 NOTE — PROGRESS NOTE ADULT - ATTENDING COMMENTS
Will follow up.
Will sign off, please call with any question.
seen with PA  agree with above  monitor on abx, f/u cultures
dc in am

## 2018-07-19 ENCOUNTER — TRANSCRIPTION ENCOUNTER (OUTPATIENT)
Age: 63
End: 2018-07-19

## 2018-07-19 VITALS
SYSTOLIC BLOOD PRESSURE: 139 MMHG | HEART RATE: 84 BPM | OXYGEN SATURATION: 96 % | TEMPERATURE: 99 F | DIASTOLIC BLOOD PRESSURE: 73 MMHG | RESPIRATION RATE: 18 BRPM

## 2018-07-19 LAB
GLUCOSE BLDC GLUCOMTR-MCNC: 111 MG/DL — HIGH (ref 70–99)
GLUCOSE BLDC GLUCOMTR-MCNC: 159 MG/DL — HIGH (ref 70–99)
GLUCOSE BLDC GLUCOMTR-MCNC: 166 MG/DL — HIGH (ref 70–99)

## 2018-07-19 PROCEDURE — 99239 HOSP IP/OBS DSCHRG MGMT >30: CPT

## 2018-07-19 RX ORDER — ACETAMINOPHEN 500 MG
2 TABLET ORAL
Qty: 0 | Refills: 0 | DISCHARGE
Start: 2018-07-19

## 2018-07-19 RX ORDER — CIPROFLOXACIN LACTATE 400MG/40ML
1 VIAL (ML) INTRAVENOUS
Qty: 28 | Refills: 0
Start: 2018-07-19 | End: 2018-08-01

## 2018-07-19 RX ORDER — DEXTROSE 50 % IN WATER 50 %
1 SYRINGE (ML) INTRAVENOUS
Qty: 84 | Refills: 0
Start: 2018-07-19 | End: 2018-08-01

## 2018-07-19 RX ORDER — INSULIN GLARGINE 100 [IU]/ML
30 INJECTION, SOLUTION SUBCUTANEOUS
Qty: 1 | Refills: 3
Start: 2018-07-19 | End: 2018-11-15

## 2018-07-19 RX ORDER — INSULIN ASPART 100 [IU]/ML
10 INJECTION, SOLUTION SUBCUTANEOUS
Qty: 1 | Refills: 3
Start: 2018-07-19 | End: 2018-11-15

## 2018-07-19 RX ADMIN — Medication 250 MILLIGRAM(S): at 17:23

## 2018-07-19 RX ADMIN — Medication 2: at 07:57

## 2018-07-19 RX ADMIN — Medication 2: at 12:02

## 2018-07-19 RX ADMIN — Medication 10 UNIT(S): at 12:02

## 2018-07-19 RX ADMIN — Medication 750 MILLIGRAM(S): at 05:34

## 2018-07-19 RX ADMIN — Medication 10 UNIT(S): at 17:21

## 2018-07-19 RX ADMIN — ENOXAPARIN SODIUM 40 MILLIGRAM(S): 100 INJECTION SUBCUTANEOUS at 12:03

## 2018-07-19 RX ADMIN — Medication 250 MILLIGRAM(S): at 05:34

## 2018-07-19 RX ADMIN — Medication 750 MILLIGRAM(S): at 17:22

## 2018-07-19 RX ADMIN — Medication 100 MICROGRAM(S): at 05:34

## 2018-07-19 RX ADMIN — Medication 10 UNIT(S): at 07:57

## 2018-07-19 NOTE — DISCHARGE NOTE ADULT - CARE PLAN
Principal Discharge DX:	Sepsis due to urinary tract infection  Goal:	resolution  Assessment and plan of treatment:	finish course of antibiotics  follow up with PMD in 1 week  Secondary Diagnosis:	Bacteremia due to Escherichia coli  Assessment and plan of treatment:	see above  Secondary Diagnosis:	Pyelonephritis  Assessment and plan of treatment:	see above  Secondary Diagnosis:	Type 2 diabetes mellitus with hyperglycemia, without long-term current use of insulin  Assessment and plan of treatment:	uncontrolled as hga1c 11  continue insulin therapy as prescribed.    frequent glucose monitoring  follow up with endocrinology  Secondary Diagnosis:	Acquired hypothyroidism  Assessment and plan of treatment:	continue home medications  Secondary Diagnosis:	Essential hypertension  Assessment and plan of treatment:	continue home medications

## 2018-07-19 NOTE — DISCHARGE NOTE ADULT - HOSPITAL COURSE
64 yo F w/ hx Dm2, hypothyroid presents to ER for 3-4 day hx of worsening left flank and suprapubic Tenderness.   in ER, meets sepsis criteria due to UTI/pyelonephritis along with bacteremia--sensitive ECOLI  Improved with IVF, pain control and IV antibiotics. transitioned to 2 weeks of ciprofloxacin per infectious disease.  seen by endocrinology for uncontrolled diabetes, underwent education and started on insulin therapy.  advised to follow up as directed.    dc planning 35 minutes.  vss afebrile aaox3 nad no acute complaints.  rrr s1s2 ctab  minimal left flank pain. nonfocal neuro.l

## 2018-07-19 NOTE — DISCHARGE NOTE ADULT - MEDICATION SUMMARY - MEDICATIONS TO STOP TAKING
I will STOP taking the medications listed below when I get home from the hospital:    amoxicillin 500 mg oral capsule  -- 1 cap(s) by mouth 2 times a day   -- Finish all this medication unless otherwise directed by prescriber.    glipiZIDE 10 mg oral tablet  -- 1 tab(s) by mouth 2 times a day

## 2018-07-19 NOTE — DISCHARGE NOTE ADULT - CARE PROVIDER_API CALL
Olga Lidia Bridges), EndocrinologyMetabDiabetes; Internal Medicine  [address] [place] [address]  Phone: [phone]  Fax: [phone]    Marquis Osorio), Infectious Disease; Internal Medicine  [address] [place] [address]  Phone: [phone]  Fax: [phone]

## 2018-07-19 NOTE — DISCHARGE NOTE ADULT - MEDICATION SUMMARY - MEDICATIONS TO TAKE
I will START or STAY ON the medications listed below when I get home from the hospital:    acetaminophen 325 mg oral tablet  -- 2 tab(s) by mouth every 6 hours, As needed, mild to mod pain/ fever >38c  -- Indication: For Pain    lisinopril 10 mg oral tablet  -- 1 tab(s) by mouth once a day  -- Indication: For Hypertension    Basaglar KwikPen 100 units/mL subcutaneous solution  -- 30 unit(s) subcutaneous once a day (at bedtime)   -- Do not drink alcoholic beverages when taking this medication.  It is very important that you take or use this exactly as directed.  Do not skip doses or discontinue unless directed by your doctor.  Keep in refrigerator.  Do not freeze.    -- Indication: For diabetes    NovoLOG FlexPen 100 units/mL injectable solution  -- 10 unit(s) injectable 3 times a day   -- Check with your doctor before becoming pregnant.  Do not drink alcoholic beverages when taking this medication.  Keep in refrigerator.  Do not freeze.  Obtain medical advice before taking any non-prescription drugs as some may affect the action of this medication.    -- Indication: For diabetes    glucose 4 g oral tablet, chewable  -- 1 tab(s) chewed every 4 hours, As Needed   hypoglycemia <70 or symptomatic hypoglycemia  -- Chew tablets before swallowing    -- Indication: For Hypoglycemia    ciprofloxacin 750 mg oral tablet  -- 1 tab(s) by mouth 2 times a day  -- Indication: For Bacteremia due to Gram-negative bacteria    Synthroid 100 mcg (0.1 mg) oral tablet  -- 1 tab(s) by mouth once a day  -- Indication: For Hypothyroid

## 2018-07-19 NOTE — DISCHARGE NOTE ADULT - SECONDARY DIAGNOSIS.
Bacteremia due to Escherichia coli Pyelonephritis Type 2 diabetes mellitus with hyperglycemia, without long-term current use of insulin Acquired hypothyroidism Essential hypertension

## 2018-07-21 LAB
CULTURE RESULTS: SIGNIFICANT CHANGE UP
SPECIMEN SOURCE: SIGNIFICANT CHANGE UP

## 2018-07-22 LAB
CULTURE RESULTS: SIGNIFICANT CHANGE UP
CULTURE RESULTS: SIGNIFICANT CHANGE UP
SPECIMEN SOURCE: SIGNIFICANT CHANGE UP
SPECIMEN SOURCE: SIGNIFICANT CHANGE UP

## 2018-07-23 PROBLEM — E07.9 DISORDER OF THYROID, UNSPECIFIED: Chronic | Status: ACTIVE | Noted: 2018-07-04

## 2018-07-24 LAB — GAD65 AB SER-MCNC: 0.18 NMOL/L — HIGH

## 2018-08-14 ENCOUNTER — EMERGENCY (EMERGENCY)
Facility: HOSPITAL | Age: 63
LOS: 1 days | Discharge: HOME | End: 2018-08-14
Attending: EMERGENCY MEDICINE
Payer: COMMERCIAL

## 2018-08-14 VITALS
WEIGHT: 138.01 LBS | HEART RATE: 81 BPM | DIASTOLIC BLOOD PRESSURE: 74 MMHG | RESPIRATION RATE: 20 BRPM | HEIGHT: 63 IN | OXYGEN SATURATION: 100 % | SYSTOLIC BLOOD PRESSURE: 124 MMHG | TEMPERATURE: 99 F

## 2018-08-14 LAB
ALBUMIN SERPL ELPH-MCNC: 4.4 G/DL — SIGNIFICANT CHANGE UP (ref 3.3–5.2)
ALP SERPL-CCNC: 70 U/L — SIGNIFICANT CHANGE UP (ref 40–120)
ALT FLD-CCNC: 11 U/L — SIGNIFICANT CHANGE UP
ANION GAP SERPL CALC-SCNC: 14 MMOL/L — SIGNIFICANT CHANGE UP (ref 5–17)
APTT BLD: 29.6 SEC — SIGNIFICANT CHANGE UP (ref 27.5–37.4)
AST SERPL-CCNC: 19 U/L — SIGNIFICANT CHANGE UP
BASOPHILS # BLD AUTO: 0 K/UL — SIGNIFICANT CHANGE UP (ref 0–0.2)
BASOPHILS NFR BLD AUTO: 0.5 % — SIGNIFICANT CHANGE UP (ref 0–2)
BILIRUB SERPL-MCNC: 0.4 MG/DL — SIGNIFICANT CHANGE UP (ref 0.4–2)
BLD GP AB SCN SERPL QL: SIGNIFICANT CHANGE UP
BUN SERPL-MCNC: 14 MG/DL — SIGNIFICANT CHANGE UP (ref 8–20)
CALCIUM SERPL-MCNC: 9.7 MG/DL — SIGNIFICANT CHANGE UP (ref 8.6–10.2)
CHLORIDE SERPL-SCNC: 94 MMOL/L — LOW (ref 98–107)
CO2 SERPL-SCNC: 26 MMOL/L — SIGNIFICANT CHANGE UP (ref 22–29)
CREAT SERPL-MCNC: 0.47 MG/DL — LOW (ref 0.5–1.3)
EOSINOPHIL # BLD AUTO: 0.1 K/UL — SIGNIFICANT CHANGE UP (ref 0–0.5)
EOSINOPHIL NFR BLD AUTO: 2.2 % — SIGNIFICANT CHANGE UP (ref 0–6)
GLUCOSE SERPL-MCNC: 190 MG/DL — HIGH (ref 70–115)
HCT VFR BLD CALC: 39.1 % — SIGNIFICANT CHANGE UP (ref 37–47)
HGB BLD-MCNC: 13.1 G/DL — SIGNIFICANT CHANGE UP (ref 12–16)
INR BLD: 1.04 RATIO — SIGNIFICANT CHANGE UP (ref 0.88–1.16)
LIDOCAIN IGE QN: 38 U/L — SIGNIFICANT CHANGE UP (ref 22–51)
LYMPHOCYTES # BLD AUTO: 2.5 K/UL — SIGNIFICANT CHANGE UP (ref 1–4.8)
LYMPHOCYTES # BLD AUTO: 39.1 % — SIGNIFICANT CHANGE UP (ref 20–55)
MCHC RBC-ENTMCNC: 27.9 PG — SIGNIFICANT CHANGE UP (ref 27–31)
MCHC RBC-ENTMCNC: 33.5 G/DL — SIGNIFICANT CHANGE UP (ref 32–36)
MCV RBC AUTO: 83.4 FL — SIGNIFICANT CHANGE UP (ref 81–99)
MONOCYTES # BLD AUTO: 0.4 K/UL — SIGNIFICANT CHANGE UP (ref 0–0.8)
MONOCYTES NFR BLD AUTO: 5.5 % — SIGNIFICANT CHANGE UP (ref 3–10)
NEUTROPHILS # BLD AUTO: 3.4 K/UL — SIGNIFICANT CHANGE UP (ref 1.8–8)
NEUTROPHILS NFR BLD AUTO: 52.5 % — SIGNIFICANT CHANGE UP (ref 37–73)
PLATELET # BLD AUTO: 167 K/UL — SIGNIFICANT CHANGE UP (ref 150–400)
POTASSIUM SERPL-MCNC: 4.5 MMOL/L — SIGNIFICANT CHANGE UP (ref 3.5–5.3)
POTASSIUM SERPL-SCNC: 4.5 MMOL/L — SIGNIFICANT CHANGE UP (ref 3.5–5.3)
PROT SERPL-MCNC: 7.9 G/DL — SIGNIFICANT CHANGE UP (ref 6.6–8.7)
PROTHROM AB SERPL-ACNC: 11.4 SEC — SIGNIFICANT CHANGE UP (ref 9.8–12.7)
RBC # BLD: 4.69 M/UL — SIGNIFICANT CHANGE UP (ref 4.4–5.2)
RBC # FLD: 13.2 % — SIGNIFICANT CHANGE UP (ref 11–15.6)
SODIUM SERPL-SCNC: 134 MMOL/L — LOW (ref 135–145)
TYPE + AB SCN PNL BLD: SIGNIFICANT CHANGE UP
WBC # BLD: 6.4 K/UL — SIGNIFICANT CHANGE UP (ref 4.8–10.8)
WBC # FLD AUTO: 6.4 K/UL — SIGNIFICANT CHANGE UP (ref 4.8–10.8)

## 2018-08-14 PROCEDURE — 99284 EMERGENCY DEPT VISIT MOD MDM: CPT

## 2018-08-14 PROCEDURE — 93010 ELECTROCARDIOGRAM REPORT: CPT

## 2018-08-14 PROCEDURE — 76705 ECHO EXAM OF ABDOMEN: CPT | Mod: 26

## 2018-08-14 RX ORDER — SODIUM CHLORIDE 9 MG/ML
1000 INJECTION INTRAMUSCULAR; INTRAVENOUS; SUBCUTANEOUS ONCE
Qty: 0 | Refills: 0 | Status: COMPLETED | OUTPATIENT
Start: 2018-08-14 | End: 2018-08-14

## 2018-08-14 RX ORDER — ONDANSETRON 8 MG/1
4 TABLET, FILM COATED ORAL ONCE
Qty: 0 | Refills: 0 | Status: COMPLETED | OUTPATIENT
Start: 2018-08-14 | End: 2018-08-14

## 2018-08-14 RX ADMIN — ONDANSETRON 4 MILLIGRAM(S): 8 TABLET, FILM COATED ORAL at 22:56

## 2018-08-14 RX ADMIN — SODIUM CHLORIDE 1000 MILLILITER(S): 9 INJECTION INTRAMUSCULAR; INTRAVENOUS; SUBCUTANEOUS at 22:56

## 2018-08-14 NOTE — ED PROVIDER NOTE - OBJECTIVE STATEMENT
Patient is a 64 y/o female c/o of vomiting Patient is a 62 y/o female c/o of vomiting and nausea that onset a few days ago. Patient admits to multiple episodes of vomiting (non-bloody, nonbilious). Patient states she is experiencing abdominal pain, epigastric and RUQ. Patient states she had one episode of diarrhea. Patient denies cp, SOB, fevers, dysuria.

## 2018-08-14 NOTE — ED PROVIDER NOTE - PHYSICAL EXAMINATION
Const: Awake, alert and oriented. In no acute distress. Well appearing.  HEENT: NC/AT. Moist mucous membranes.  Eyes: No scleral icterus. EOMI.  Neck:. Soft and supple. Full ROM without pain.  Cardiac: +S1/S2. No murmurs.   Resp: Speaking in full sentences. No evidence of respiratory distress. No wheezes, rales or rhonchi.  Abd: Soft, epigastric and RUQ tenderness on palpation, non-distended. Normal bowel sounds in all 4 quadrants. No guarding or rebound.  Back: Spine midline and non-tender. No CVAT.  Skin: No rashes, abrasions or lacerations.  Neuro: Awake, alert & oriented x 3. Moves all extremities symmetrically.

## 2018-08-14 NOTE — ED ADULT TRIAGE NOTE - CHIEF COMPLAINT QUOTE
"I have a swollen stomach and I have been vomiting x 4 days and unable to eat for 3 days. " Pt denies fever but has chills . Pt had diarrhea today.

## 2018-08-14 NOTE — ED PROVIDER NOTE - PROGRESS NOTE DETAILS
Ultrasound of gallbllader, lab work and urine reviewed, copy of results printed for pt, pt reports improvement in sxs, pt explained results and d/c instructions, pt verbalizes understanding results and d/c instructions

## 2018-08-15 VITALS
TEMPERATURE: 98 F | RESPIRATION RATE: 18 BRPM | SYSTOLIC BLOOD PRESSURE: 105 MMHG | OXYGEN SATURATION: 99 % | HEART RATE: 73 BPM | DIASTOLIC BLOOD PRESSURE: 60 MMHG

## 2018-08-15 LAB
APPEARANCE UR: CLEAR — SIGNIFICANT CHANGE UP
BACTERIA # UR AUTO: ABNORMAL
BILIRUB UR-MCNC: NEGATIVE — SIGNIFICANT CHANGE UP
COLOR SPEC: YELLOW — SIGNIFICANT CHANGE UP
DIFF PNL FLD: NEGATIVE — SIGNIFICANT CHANGE UP
EPI CELLS # UR: SIGNIFICANT CHANGE UP
GLUCOSE UR QL: 50 MG/DL
KETONES UR-MCNC: NEGATIVE — SIGNIFICANT CHANGE UP
LEUKOCYTE ESTERASE UR-ACNC: NEGATIVE — SIGNIFICANT CHANGE UP
NITRITE UR-MCNC: NEGATIVE — SIGNIFICANT CHANGE UP
PH UR: 7 — SIGNIFICANT CHANGE UP (ref 5–8)
PROT UR-MCNC: NEGATIVE MG/DL — SIGNIFICANT CHANGE UP
RBC CASTS # UR COMP ASSIST: SIGNIFICANT CHANGE UP /HPF (ref 0–4)
SP GR SPEC: 1 — LOW (ref 1.01–1.02)
UROBILINOGEN FLD QL: NEGATIVE MG/DL — SIGNIFICANT CHANGE UP
WBC UR QL: SIGNIFICANT CHANGE UP

## 2018-08-15 PROCEDURE — 85027 COMPLETE CBC AUTOMATED: CPT

## 2018-08-15 PROCEDURE — 86850 RBC ANTIBODY SCREEN: CPT

## 2018-08-15 PROCEDURE — 96374 THER/PROPH/DIAG INJ IV PUSH: CPT

## 2018-08-15 PROCEDURE — T1013: CPT

## 2018-08-15 PROCEDURE — 85730 THROMBOPLASTIN TIME PARTIAL: CPT

## 2018-08-15 PROCEDURE — 86901 BLOOD TYPING SEROLOGIC RH(D): CPT

## 2018-08-15 PROCEDURE — 93005 ELECTROCARDIOGRAM TRACING: CPT

## 2018-08-15 PROCEDURE — 76705 ECHO EXAM OF ABDOMEN: CPT

## 2018-08-15 PROCEDURE — 83690 ASSAY OF LIPASE: CPT

## 2018-08-15 PROCEDURE — 86900 BLOOD TYPING SEROLOGIC ABO: CPT

## 2018-08-15 PROCEDURE — 81001 URINALYSIS AUTO W/SCOPE: CPT

## 2018-08-15 PROCEDURE — 85610 PROTHROMBIN TIME: CPT

## 2018-08-15 PROCEDURE — 36415 COLL VENOUS BLD VENIPUNCTURE: CPT

## 2018-08-15 PROCEDURE — 99284 EMERGENCY DEPT VISIT MOD MDM: CPT | Mod: 25

## 2018-08-15 PROCEDURE — 80053 COMPREHEN METABOLIC PANEL: CPT

## 2018-08-15 NOTE — ED ADULT NURSE NOTE - NSIMPLEMENTINTERV_GEN_ALL_ED
Implemented All Universal Safety Interventions:  [place] to call system. Call bell, personal items and telephone within reach. Instruct patient to call for assistance. Room bathroom lighting operational. Non-slip footwear when patient is off stretcher. Physically safe environment: no spills, clutter or unnecessary equipment. Stretcher in lowest position, wheels locked, appropriate side rails in place.

## 2018-08-15 NOTE — ED ADULT NURSE NOTE - OBJECTIVE STATEMENT
Patient presents to ER complaining of nausea, vomiting and abdominal pain, reports bloating and one episode of diarrhea.

## 2018-09-27 ENCOUNTER — APPOINTMENT (OUTPATIENT)
Dept: GASTROENTEROLOGY | Facility: CLINIC | Age: 63
End: 2018-09-27
Payer: MEDICAID

## 2018-09-27 VITALS
HEIGHT: 63 IN | SYSTOLIC BLOOD PRESSURE: 110 MMHG | WEIGHT: 136 LBS | DIASTOLIC BLOOD PRESSURE: 70 MMHG | BODY MASS INDEX: 24.1 KG/M2 | HEART RATE: 74 BPM | OXYGEN SATURATION: 98 % | RESPIRATION RATE: 16 BRPM

## 2018-09-27 PROCEDURE — 82272 OCCULT BLD FECES 1-3 TESTS: CPT

## 2018-09-27 PROCEDURE — 99214 OFFICE O/P EST MOD 30 MIN: CPT

## 2018-10-05 ENCOUNTER — FORM ENCOUNTER (OUTPATIENT)
Age: 63
End: 2018-10-05

## 2018-10-06 ENCOUNTER — OUTPATIENT (OUTPATIENT)
Dept: OUTPATIENT SERVICES | Facility: HOSPITAL | Age: 63
LOS: 1 days | End: 2018-10-06
Payer: MEDICAID

## 2018-10-06 ENCOUNTER — APPOINTMENT (OUTPATIENT)
Dept: ULTRASOUND IMAGING | Facility: CLINIC | Age: 63
End: 2018-10-06
Payer: MEDICAID

## 2018-10-06 DIAGNOSIS — R10.11 RIGHT UPPER QUADRANT PAIN: ICD-10-CM

## 2018-10-06 PROCEDURE — 76700 US EXAM ABDOM COMPLETE: CPT

## 2018-10-06 PROCEDURE — 76700 US EXAM ABDOM COMPLETE: CPT | Mod: 26

## 2018-11-12 LAB
ALBUMIN SERPL ELPH-MCNC: 4.3 G/DL
ALP BLD-CCNC: 65 U/L
ALT SERPL-CCNC: 13 U/L
ANION GAP SERPL CALC-SCNC: 13 MMOL/L
AST SERPL-CCNC: 11 U/L
BASOPHILS # BLD AUTO: 0.02 K/UL
BASOPHILS NFR BLD AUTO: 0.4 %
BILIRUB SERPL-MCNC: 0.4 MG/DL
BUN SERPL-MCNC: 14 MG/DL
CALCIUM SERPL-MCNC: 9.7 MG/DL
CHLORIDE SERPL-SCNC: 98 MMOL/L
CO2 SERPL-SCNC: 27 MMOL/L
CREAT SERPL-MCNC: 0.66 MG/DL
EOSINOPHIL # BLD AUTO: 0.11 K/UL
EOSINOPHIL NFR BLD AUTO: 2 %
GLUCOSE SERPL-MCNC: 239 MG/DL
HCT VFR BLD CALC: 43.1 %
HGB BLD-MCNC: 14.5 G/DL
IMM GRANULOCYTES NFR BLD AUTO: 0.2 %
LYMPHOCYTES # BLD AUTO: 2.36 K/UL
LYMPHOCYTES NFR BLD AUTO: 42.1 %
MAN DIFF?: NORMAL
MCHC RBC-ENTMCNC: 28.5 PG
MCHC RBC-ENTMCNC: 33.6 GM/DL
MCV RBC AUTO: 84.8 FL
MONOCYTES # BLD AUTO: 0.42 K/UL
MONOCYTES NFR BLD AUTO: 7.5 %
NEUTROPHILS # BLD AUTO: 2.68 K/UL
NEUTROPHILS NFR BLD AUTO: 47.8 %
PLATELET # BLD AUTO: 184 K/UL
POTASSIUM SERPL-SCNC: 4.5 MMOL/L
PROT SERPL-MCNC: 7.4 G/DL
RBC # BLD: 5.08 M/UL
RBC # FLD: 12.8 %
SODIUM SERPL-SCNC: 138 MMOL/L
WBC # FLD AUTO: 5.6 K/UL

## 2018-12-10 ENCOUNTER — RESULT REVIEW (OUTPATIENT)
Age: 63
End: 2018-12-10

## 2018-12-10 ENCOUNTER — OUTPATIENT (OUTPATIENT)
Dept: OUTPATIENT SERVICES | Facility: HOSPITAL | Age: 63
LOS: 1 days | End: 2018-12-10
Payer: MEDICAID

## 2018-12-10 ENCOUNTER — APPOINTMENT (OUTPATIENT)
Dept: GASTROENTEROLOGY | Facility: GI CENTER | Age: 63
End: 2018-12-10
Payer: MEDICAID

## 2018-12-10 VITALS
TEMPERATURE: 98 F | BODY MASS INDEX: 24.1 KG/M2 | WEIGHT: 136 LBS | SYSTOLIC BLOOD PRESSURE: 124 MMHG | RESPIRATION RATE: 12 BRPM | HEIGHT: 63 IN | DIASTOLIC BLOOD PRESSURE: 84 MMHG | HEART RATE: 70 BPM

## 2018-12-10 DIAGNOSIS — R10.11 RIGHT UPPER QUADRANT PAIN: ICD-10-CM

## 2018-12-10 DIAGNOSIS — R10.13 EPIGASTRIC PAIN: ICD-10-CM

## 2018-12-10 LAB — GLUCOSE BLDC GLUCOMTR-MCNC: 261 MG/DL — HIGH (ref 70–99)

## 2018-12-10 PROCEDURE — 43239 EGD BIOPSY SINGLE/MULTIPLE: CPT

## 2018-12-10 PROCEDURE — 88305 TISSUE EXAM BY PATHOLOGIST: CPT | Mod: 26

## 2018-12-10 PROCEDURE — 88305 TISSUE EXAM BY PATHOLOGIST: CPT

## 2018-12-10 PROCEDURE — 88342 IMHCHEM/IMCYTCHM 1ST ANTB: CPT

## 2018-12-10 PROCEDURE — 82962 GLUCOSE BLOOD TEST: CPT

## 2018-12-10 PROCEDURE — 88342 IMHCHEM/IMCYTCHM 1ST ANTB: CPT | Mod: 26

## 2018-12-12 LAB — SURGICAL PATHOLOGY FINAL REPORT - CH: SIGNIFICANT CHANGE UP

## 2018-12-26 ENCOUNTER — APPOINTMENT (OUTPATIENT)
Dept: NEPHROLOGY | Facility: CLINIC | Age: 63
End: 2018-12-26

## 2018-12-26 PROCEDURE — 83690 ASSAY OF LIPASE: CPT

## 2018-12-26 PROCEDURE — 36415 COLL VENOUS BLD VENIPUNCTURE: CPT

## 2018-12-26 PROCEDURE — 87186 SC STD MICRODIL/AGAR DIL: CPT

## 2018-12-26 PROCEDURE — 82962 GLUCOSE BLOOD TEST: CPT

## 2018-12-26 PROCEDURE — 80048 BASIC METABOLIC PNL TOTAL CA: CPT

## 2018-12-26 PROCEDURE — 86341 ISLET CELL ANTIBODY: CPT

## 2018-12-26 PROCEDURE — 96375 TX/PRO/DX INJ NEW DRUG ADDON: CPT

## 2018-12-26 PROCEDURE — 80061 LIPID PANEL: CPT

## 2018-12-26 PROCEDURE — 81001 URINALYSIS AUTO W/SCOPE: CPT

## 2018-12-26 PROCEDURE — 87040 BLOOD CULTURE FOR BACTERIA: CPT

## 2018-12-26 PROCEDURE — 93005 ELECTROCARDIOGRAM TRACING: CPT

## 2018-12-26 PROCEDURE — 83605 ASSAY OF LACTIC ACID: CPT

## 2018-12-26 PROCEDURE — 85027 COMPLETE CBC AUTOMATED: CPT

## 2018-12-26 PROCEDURE — 74177 CT ABD & PELVIS W/CONTRAST: CPT

## 2018-12-26 PROCEDURE — 87086 URINE CULTURE/COLONY COUNT: CPT

## 2018-12-26 PROCEDURE — 80053 COMPREHEN METABOLIC PANEL: CPT

## 2018-12-26 PROCEDURE — 83036 HEMOGLOBIN GLYCOSYLATED A1C: CPT

## 2018-12-26 PROCEDURE — 84443 ASSAY THYROID STIM HORMONE: CPT

## 2018-12-26 PROCEDURE — 96374 THER/PROPH/DIAG INJ IV PUSH: CPT | Mod: XU

## 2018-12-26 PROCEDURE — 87150 DNA/RNA AMPLIFIED PROBE: CPT

## 2018-12-26 PROCEDURE — 84681 ASSAY OF C-PEPTIDE: CPT

## 2018-12-26 PROCEDURE — T1013: CPT

## 2018-12-26 PROCEDURE — 99285 EMERGENCY DEPT VISIT HI MDM: CPT | Mod: 25

## 2019-01-29 NOTE — ED PROVIDER NOTE - NS ED MD DISPO DISCHARGE
"-- Message is from the [employer] [place] [employer] [place]--    Reason for Call: Patient would like a callback. Missed call from office earlier. Please call patient at [phone]    Caller Information       Type Contact Phone    01/29/2019 10:00 AM Phone (Incoming) Lucas Robbins (Self) [phone] (H)          Alternative phone number: None    Turnaround time given to caller: ""This message will be sent to [facility] Provider's name]. The clinical team will fulfill your request as soon as they review your message. \""    " Home

## 2019-02-21 ENCOUNTER — APPOINTMENT (OUTPATIENT)
Dept: GASTROENTEROLOGY | Facility: CLINIC | Age: 64
End: 2019-02-21
Payer: MEDICAID

## 2019-02-21 VITALS
HEIGHT: 63 IN | BODY MASS INDEX: 23.92 KG/M2 | WEIGHT: 135 LBS | HEART RATE: 80 BPM | DIASTOLIC BLOOD PRESSURE: 70 MMHG | SYSTOLIC BLOOD PRESSURE: 139 MMHG

## 2019-02-21 PROCEDURE — 99214 OFFICE O/P EST MOD 30 MIN: CPT

## 2019-02-21 NOTE — HISTORY OF PRESENT ILLNESS
[None] : had no significant interval events [Heartburn] : improved heartburn [Nausea] : denies nausea [Vomiting] : denies vomiting [Diarrhea] : denies diarrhea [Constipation] : denies constipation [Yellow Skin Or Eyes (Jaundice)] : denies jaundice [Abdominal Pain] : stable abdominal pain [Abdominal Swelling] : denies abdominal swelling [Rectal Pain] : denies rectal pain [GERD] : gastroesophageal reflux disease [Abdominal Surgery] : abdominal surgery [Wt Gain ___ Lbs] : no recent weight gain [Hiatus Hernia] : no hiatus hernia [Peptic Ulcer Disease] : no peptic ulcer disease [Pancreatitis] : no pancreatitis [Cholelithiasis] : no cholelithiasis [Kidney Stone] : no kidney stone [Inflammatory Bowel Disease] : no inflammatory bowel disease [Irritable Bowel Syndrome] : no irritable bowel syndrome [Diverticulitis] : no diverticulitis [Alcohol Abuse] : no alcohol abuse [Malignancy] : no malignancy [Appendectomy] : no appendectomy [Cholecystectomy] : no cholecystectomy [de-identified] : December 2018 [de-identified] : EGD with biopsy [de-identified] : Patient presents today for followup evaluation of chronic postprandial dyspepsia status post a recent upper endoscopy with biopsy done December 10, 2018 which showed moderate gastritis with negative gastric biopsies for H. pylori. She previously been tried on and did not respond to omeprazole and was started on famotidine 40 mg twice daily post EGD with some symptomatic relief. However she continues to complain of postprandial bloating and dyspeptic symptoms despite the above medical therapy.

## 2019-02-21 NOTE — ASSESSMENT
[FreeTextEntry1] : Impression: Chronic postprandial dyspepsia with an upper endoscopy done recently that showed gastritis with negative H. pylori biopsies but does not respond to famotidine 40 mg twice daily her previously to omeprazole 40 mg once daily. 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 She is to continue current famotidine therapy 40 mg twice daily and return here in 6 weeks' time to assess for therapeutic response to the above dicyclomine therapy. A low-fat antireflux diet was also recommended and explained to the patient today who appeared to understand all of the above instructions the management plan which were explained to her in Zimbabwean by myself who speaks Zimbabwean.

## 2019-02-21 NOTE — PHYSICAL EXAM
[General Appearance - Alert] : alert [General Appearance - In No Acute Distress] : in no acute distress [General Appearance - Well Nourished] : well nourished [General Appearance - Well Developed] : well developed [General Appearance - Well-Appearing] : healthy appearing [Sclera] : the sclera and conjunctiva were normal [PERRL With Normal Accommodation] : pupils were equal in size, round, and reactive to light [Extraocular Movements] : extraocular movements were intact [Outer Ear] : the ears and nose were normal in appearance [Hearing Threshold Finger Rub Not [place]] : hearing was normal [Examination Of The Oral Cavity] : the lips and gums were normal [Neck Appearance] : the appearance of the neck was normal [Neck Cervical Mass (___cm)] : no neck mass was observed [Jugular Venous Distention Increased] : there was no jugular-venous distention [Thyroid Diffuse Enlargement] : the thyroid was not enlarged [Exaggerated Use Of Accessory Muscles For Inspiration] : no accessory muscle use [Auscultation Breath Sounds / Voice Sounds] : lungs were clear to auscultation bilaterally [Heart Rate And Rhythm] : heart rate was normal and rhythm regular [Heart Sounds] : normal S1 and S2 [Heart Sounds Gallop] : no gallops [Murmurs] : no murmurs [Bowel Sounds] : normal bowel sounds [Abdomen Soft] : soft [] : no hepato-splenomegaly [Abdomen Mass (___ Cm)] : no abdominal mass palpated [Epigastric] : in the epigastric area [RUQ] : in the right upper quadrant [Normal Sphincter Tone] : normal sphincter tone [No Rectal Mass] : no rectal mass [No CVA Tenderness] : no ~M costovertebral angle tenderness [Abnormal Walk] : normal gait [Skin Color & Pigmentation] : normal skin color and pigmentation [Skin Turgor] : normal skin turgor [Oriented To Time, Place, And Person] : oriented to person, place, and time [Periumbilical] : not in the periumbilical area [Suprapubic] : not in the suprapubic area [RLQ] : not in the right lower quadrant [LUQ] : not in the left upper quadrant [LLQ] : not in the left lower quadrant [Internal Hemorrhoid] : no internal hemorrhoids [External Hemorrhoid] : no external hemorrhoids [Occult Blood Positive] : stool was negative for occult blood [FreeTextEntry1] : the exam was chaperoned

## 2019-02-21 NOTE — REVIEW OF SYSTEMS
[As Noted in HPI] : as noted in HPI [Abdominal Pain] : abdominal pain [Heartburn] : heartburn [Negative] : Heme/Lymph [Vomiting] : no vomiting [Constipation] : no constipation [Diarrhea] : no diarrhea [Melena] : no melena [FreeTextEntry7] : dyspepsia

## 2019-04-11 ENCOUNTER — APPOINTMENT (OUTPATIENT)
Dept: GASTROENTEROLOGY | Facility: CLINIC | Age: 64
End: 2019-04-11
Payer: MEDICAID

## 2019-04-11 ENCOUNTER — OTHER (OUTPATIENT)
Age: 64
End: 2019-04-11

## 2019-04-11 VITALS
DIASTOLIC BLOOD PRESSURE: 70 MMHG | WEIGHT: 133 LBS | HEIGHT: 63 IN | SYSTOLIC BLOOD PRESSURE: 127 MMHG | HEART RATE: 60 BPM | BODY MASS INDEX: 23.57 KG/M2

## 2019-04-11 PROCEDURE — 99214 OFFICE O/P EST MOD 30 MIN: CPT

## 2019-04-11 NOTE — ASSESSMENT
[FreeTextEntry1] : Impression: Chronic postprandial dyspepsia with right upper quadrant pain rule out possible hepatobiliary pathology rule out possible fatty liver disease rule out possible dyspeptic symptoms from chronic gastritis. \par \par Recommendations: Carafate 1 g liquid 4 times a day was prescribed for her postprandial dyspeptic symptoms and she was sent for lab work including CMP, CBC with differential, amylase and lipase, alpha-fetoprotein, and was also sent for an abdominal sonogram. She is to return in 6 weeks' time for followup evaluation and appeared to understand all of the above instructions and management plan which were explained to her today in Vietnamese by myself who speaks Vietnamese.\par \par

## 2019-04-11 NOTE — REVIEW OF SYSTEMS
[As Noted in HPI] : as noted in HPI [Abdominal Pain] : abdominal pain [Negative] : Heme/Lymph [Vomiting] : no vomiting [Diarrhea] : no diarrhea [Constipation] : no constipation [Melena] : no melena [Heartburn] : no heartburn [FreeTextEntry7] : ostprandial dyspepsia

## 2019-04-11 NOTE — HISTORY OF PRESENT ILLNESS
[None] : had no significant interval events [Heartburn] : denies heartburn [Nausea] : denies nausea [Constipation] : denies constipation [Vomiting] : denies vomiting [Diarrhea] : denies diarrhea [Yellow Skin Or Eyes (Jaundice)] : denies jaundice [Abdominal Swelling] : denies abdominal swelling [Abdominal Pain] : abdominal pain worsened [Rectal Pain] : denies rectal pain [Abdominal Surgery] : abdominal surgery [Wt Gain ___ Lbs] : no recent weight gain [Wt Loss ___ Lbs] : no recent weight loss [GERD] : no gastroesophageal reflux disease [Hiatus Hernia] : no hiatus hernia [Cholelithiasis] : no cholelithiasis [Pancreatitis] : no pancreatitis [Peptic Ulcer Disease] : no peptic ulcer disease [Kidney Stone] : no kidney stone [Inflammatory Bowel Disease] : no inflammatory bowel disease [Irritable Bowel Syndrome] : no irritable bowel syndrome [Diverticulitis] : no diverticulitis [Alcohol Abuse] : no alcohol abuse [Malignancy] : no malignancy [Cholecystectomy] : no cholecystectomy [Appendectomy] : no appendectomy [de-identified] : Patient presents today for followup evaluation of chronic postprandial dyspepsia and right upper quadrant pain status post a recent upper endoscopy with biopsy done in December of 2018 which showed H. pylori negative gastritis treated initially with omeprazole 40 mg daily and famotidine 40 mg twice daily with persistent postprandial dyspepsia. She was subsequently treated with dicyclomine 20 mg 4 times a day as needed for this postprandial dyspepsia with no significant symptomatic relief and she returns today complaining of the same postprandial dyspepsia and right upper quadrant pain. She has had no recent lab work or abdominal sonograms. [de-identified] : February 2019 [de-identified] : changing medications [de-identified] : postprandial dyspepsia

## 2019-04-11 NOTE — PHYSICAL EXAM
[General Appearance - Alert] : alert [General Appearance - In No Acute Distress] : in no acute distress [General Appearance - Well Developed] : well developed [General Appearance - Well Nourished] : well nourished [PERRL With Normal Accommodation] : pupils were equal in size, round, and reactive to light [General Appearance - Well-Appearing] : healthy appearing [Sclera] : the sclera and conjunctiva were normal [Outer Ear] : the ears and nose were normal in appearance [Extraocular Movements] : extraocular movements were intact [Examination Of The Oral Cavity] : the lips and gums were normal [Hearing Threshold Finger Rub Not [place]] : hearing was normal [Neck Appearance] : the appearance of the neck was normal [Jugular Venous Distention Increased] : there was no jugular-venous distention [Neck Cervical Mass (___cm)] : no neck mass was observed [Thyroid Diffuse Enlargement] : the thyroid was not enlarged [Exaggerated Use Of Accessory Muscles For Inspiration] : no accessory muscle use [Auscultation Breath Sounds / Voice Sounds] : lungs were clear to auscultation bilaterally [Heart Rate And Rhythm] : heart rate was normal and rhythm regular [Heart Sounds] : normal S1 and S2 [Heart Sounds Gallop] : no gallops [Bowel Sounds] : normal bowel sounds [Abdomen Soft] : soft [Murmurs] : no murmurs [] : no hepato-splenomegaly [Abdomen Mass (___ Cm)] : no abdominal mass palpated [Epigastric] : in the epigastric area [RUQ] : in the right upper quadrant [Normal Sphincter Tone] : normal sphincter tone [No Rectal Mass] : no rectal mass [No CVA Tenderness] : no ~M costovertebral angle tenderness [Abnormal Walk] : normal gait [Skin Color & Pigmentation] : normal skin color and pigmentation [Oriented To Time, Place, And Person] : oriented to person, place, and time [Skin Turgor] : normal skin turgor [Suprapubic] : not in the suprapubic area [Periumbilical] : not in the periumbilical area [RLQ] : not in the right lower quadrant [LUQ] : not in the left upper quadrant [Internal Hemorrhoid] : no internal hemorrhoids [LLQ] : not in the left lower quadrant [FreeTextEntry1] : the exam was chaperoned [Occult Blood Positive] : stool was negative for occult blood [External Hemorrhoid] : no external hemorrhoids

## 2019-04-13 ENCOUNTER — APPOINTMENT (OUTPATIENT)
Dept: ULTRASOUND IMAGING | Facility: CLINIC | Age: 64
End: 2019-04-13

## 2019-05-09 LAB
AFP-TM SERPL-MCNC: <1.8 NG/ML
ALBUMIN SERPL ELPH-MCNC: 4.4 G/DL
ALP BLD-CCNC: 70 U/L
ALT SERPL-CCNC: 13 U/L
AMYLASE/CREAT SERPL: 49 U/L
ANION GAP SERPL CALC-SCNC: 13 MMOL/L
AST SERPL-CCNC: 13 U/L
BASOPHILS # BLD AUTO: 0.04 K/UL
BASOPHILS NFR BLD AUTO: 0.7 %
BILIRUB SERPL-MCNC: 0.3 MG/DL
BUN SERPL-MCNC: 11 MG/DL
CALCIUM SERPL-MCNC: 9.5 MG/DL
CHLORIDE SERPL-SCNC: 99 MMOL/L
CO2 SERPL-SCNC: 25 MMOL/L
CREAT SERPL-MCNC: 0.53 MG/DL
EOSINOPHIL # BLD AUTO: 0.19 K/UL
EOSINOPHIL NFR BLD AUTO: 3.2 %
GLUCOSE SERPL-MCNC: 262 MG/DL
HCT VFR BLD CALC: 44.7 %
HGB BLD-MCNC: 14.3 G/DL
IMM GRANULOCYTES NFR BLD AUTO: 0.2 %
LPL SERPL-CCNC: 39 U/L
LYMPHOCYTES # BLD AUTO: 2.38 K/UL
LYMPHOCYTES NFR BLD AUTO: 40 %
MAN DIFF?: NORMAL
MCHC RBC-ENTMCNC: 28 PG
MCHC RBC-ENTMCNC: 32 GM/DL
MCV RBC AUTO: 87.6 FL
MONOCYTES # BLD AUTO: 0.41 K/UL
MONOCYTES NFR BLD AUTO: 6.9 %
NEUTROPHILS # BLD AUTO: 2.92 K/UL
NEUTROPHILS NFR BLD AUTO: 49 %
PLATELET # BLD AUTO: 187 K/UL
POTASSIUM SERPL-SCNC: 4.2 MMOL/L
PROT SERPL-MCNC: 7.2 G/DL
RBC # BLD: 5.1 M/UL
RBC # FLD: 12.2 %
SODIUM SERPL-SCNC: 137 MMOL/L
WBC # FLD AUTO: 5.95 K/UL

## 2019-05-23 ENCOUNTER — APPOINTMENT (OUTPATIENT)
Dept: GASTROENTEROLOGY | Facility: CLINIC | Age: 64
End: 2019-05-23

## 2019-06-04 ENCOUNTER — FORM ENCOUNTER (OUTPATIENT)
Age: 64
End: 2019-06-04

## 2019-06-05 ENCOUNTER — APPOINTMENT (OUTPATIENT)
Dept: ULTRASOUND IMAGING | Facility: CLINIC | Age: 64
End: 2019-06-05
Payer: MEDICAID

## 2019-06-05 ENCOUNTER — APPOINTMENT (OUTPATIENT)
Dept: OBGYN | Facility: CLINIC | Age: 64
End: 2019-06-05

## 2019-06-05 ENCOUNTER — OUTPATIENT (OUTPATIENT)
Dept: OUTPATIENT SERVICES | Facility: HOSPITAL | Age: 64
LOS: 1 days | End: 2019-06-05
Payer: MEDICAID

## 2019-06-05 DIAGNOSIS — Z00.00 ENCOUNTER FOR GENERAL ADULT MEDICAL EXAMINATION WITHOUT ABNORMAL FINDINGS: ICD-10-CM

## 2019-06-05 DIAGNOSIS — R10.11 RIGHT UPPER QUADRANT PAIN: ICD-10-CM

## 2019-06-05 PROCEDURE — 76700 US EXAM ABDOM COMPLETE: CPT

## 2019-06-05 PROCEDURE — 76700 US EXAM ABDOM COMPLETE: CPT | Mod: 26

## 2019-07-09 ENCOUNTER — APPOINTMENT (OUTPATIENT)
Dept: OBGYN | Facility: CLINIC | Age: 64
End: 2019-07-09
Payer: MEDICAID

## 2019-07-09 LAB
BILIRUB UR QL STRIP: NORMAL
GLUCOSE BLDC GLUCOMTR-MCNC: 280
GLUCOSE UR-MCNC: ABNORMAL
HCG UR QL: 0.2 EU/DL
HGB UR QL STRIP.AUTO: NORMAL
KETONES UR-MCNC: NORMAL
LEUKOCYTE ESTERASE UR QL STRIP: NORMAL
NITRITE UR QL STRIP: NORMAL
PH UR STRIP: 7
PROT UR STRIP-MCNC: NORMAL
SP GR UR STRIP: 1.01

## 2019-07-09 PROCEDURE — 99213 OFFICE O/P EST LOW 20 MIN: CPT

## 2019-07-09 PROCEDURE — 81003 URINALYSIS AUTO W/O SCOPE: CPT | Mod: QW

## 2019-07-09 PROCEDURE — 82962 GLUCOSE BLOOD TEST: CPT

## 2019-07-09 RX ORDER — OMEPRAZOLE 40 MG/1
40 CAPSULE, DELAYED RELEASE ORAL
Qty: 30 | Refills: 5 | Status: DISCONTINUED | COMMUNITY
Start: 2018-09-27 | End: 2019-07-09

## 2019-07-09 RX ORDER — OMEPRAZOLE 40 MG/1
40 CAPSULE, DELAYED RELEASE ORAL
Qty: 30 | Refills: 5 | Status: DISCONTINUED | COMMUNITY
Start: 2017-06-20 | End: 2019-07-09

## 2019-07-09 NOTE — PHYSICAL EXAM
[Awake] : awake [Alert] : alert [Soft] : soft [Normal] : uterus [Discharge] : a  ~M vaginal discharge was present [Heavy] : heavy [White] : white [Curds] : curd-like [Motion Tenderness] : there was no cervical motion tenderness [Tenderness] : tender [Enlarged ___ wks] : not enlarged [Uterine Adnexae] : were not tender and not enlarged [Mass ___ cm] : no uterine mass was palpated

## 2019-07-09 NOTE — DISCUSSION/SUMMARY
[FreeTextEntry1] : 1. pt will have pelvic sonogram and f/u for results\par 2. check urine culture- f/u results\par 3. treatment for vaginitis initiated\par 4. pt needs full annual- will complete annual at the time of US follow up.\par \par

## 2019-07-11 ENCOUNTER — APPOINTMENT (OUTPATIENT)
Dept: OBGYN | Facility: CLINIC | Age: 64
End: 2019-07-11
Payer: MEDICAID

## 2019-07-11 PROCEDURE — 81003 URINALYSIS AUTO W/O SCOPE: CPT | Mod: QW

## 2019-07-15 LAB
APPEARANCE: CLEAR
BACTERIA UR CULT: NORMAL
BACTERIA: NEGATIVE
BILIRUBIN URINE: NEGATIVE
BLOOD URINE: NEGATIVE
COLOR: NORMAL
GLUCOSE QUALITATIVE U: ABNORMAL
HYALINE CASTS: 0 /LPF
KETONES URINE: NEGATIVE
LEUKOCYTE ESTERASE URINE: NEGATIVE
MICROSCOPIC-UA: NORMAL
NITRITE URINE: NEGATIVE
PH URINE: 6.5
PROTEIN URINE: NEGATIVE
RED BLOOD CELLS URINE: 2 /HPF
SPECIFIC GRAVITY URINE: 1.03
SQUAMOUS EPITHELIAL CELLS: 0 /HPF
UROBILINOGEN URINE: NORMAL
WHITE BLOOD CELLS URINE: 0 /HPF

## 2019-07-16 ENCOUNTER — FORM ENCOUNTER (OUTPATIENT)
Age: 64
End: 2019-07-16

## 2019-07-17 ENCOUNTER — APPOINTMENT (OUTPATIENT)
Dept: ULTRASOUND IMAGING | Facility: CLINIC | Age: 64
End: 2019-07-17
Payer: MEDICAID

## 2019-07-17 ENCOUNTER — OUTPATIENT (OUTPATIENT)
Dept: OUTPATIENT SERVICES | Facility: HOSPITAL | Age: 64
LOS: 1 days | End: 2019-07-17
Payer: MEDICAID

## 2019-07-17 DIAGNOSIS — R10.2 PELVIC AND PERINEAL PAIN: ICD-10-CM

## 2019-07-17 PROCEDURE — 76856 US EXAM PELVIC COMPLETE: CPT

## 2019-07-17 PROCEDURE — 76856 US EXAM PELVIC COMPLETE: CPT | Mod: 26

## 2019-07-17 PROCEDURE — 76830 TRANSVAGINAL US NON-OB: CPT | Mod: 26

## 2019-07-17 PROCEDURE — 76830 TRANSVAGINAL US NON-OB: CPT

## 2019-07-18 ENCOUNTER — APPOINTMENT (OUTPATIENT)
Dept: OBGYN | Facility: CLINIC | Age: 64
End: 2019-07-18
Payer: MEDICAID

## 2019-07-18 LAB
APPEARANCE: CLEAR
BACTERIA UR CULT: NORMAL
BACTERIA: NEGATIVE
BILIRUB UR QL STRIP: NORMAL
BILIRUBIN URINE: NEGATIVE
BLOOD URINE: NEGATIVE
C TRACH RRNA SPEC QL NAA+PROBE: NOT DETECTED
CANDIDA VAG CYTO: NOT DETECTED
COLOR: NORMAL
G VAGINALIS+PREV SP MTYP VAG QL MICRO: NOT DETECTED
GLUCOSE BLDC GLUCOMTR-MCNC: 243
GLUCOSE QUALITATIVE U: ABNORMAL
GLUCOSE UR-MCNC: ABNORMAL
HCG UR QL: 0.2 EU/DL
HGB UR QL STRIP.AUTO: NORMAL
HYALINE CASTS: 0 /LPF
KETONES UR-MCNC: NORMAL
KETONES URINE: NEGATIVE
LEUKOCYTE ESTERASE UR QL STRIP: NORMAL
LEUKOCYTE ESTERASE URINE: NEGATIVE
MICROSCOPIC-UA: NORMAL
N GONORRHOEA RRNA SPEC QL NAA+PROBE: NOT DETECTED
NITRITE UR QL STRIP: NORMAL
NITRITE URINE: NEGATIVE
PH UR STRIP: 5.5
PH URINE: 6.5
PROT UR STRIP-MCNC: NORMAL
PROTEIN URINE: NEGATIVE
RED BLOOD CELLS URINE: 1 /HPF
SOURCE AMPLIFICATION: NORMAL
SP GR UR STRIP: 1.01
SPECIFIC GRAVITY URINE: 1.02
SQUAMOUS EPITHELIAL CELLS: 1 /HPF
T VAGINALIS VAG QL WET PREP: NOT DETECTED
UROBILINOGEN URINE: NORMAL
WHITE BLOOD CELLS URINE: 0 /HPF

## 2019-07-18 PROCEDURE — 82962 GLUCOSE BLOOD TEST: CPT

## 2019-07-18 PROCEDURE — 81003 URINALYSIS AUTO W/O SCOPE: CPT | Mod: QW

## 2019-07-18 PROCEDURE — 99212 OFFICE O/P EST SF 10 MIN: CPT

## 2019-07-18 NOTE — HISTORY OF PRESENT ILLNESS
[Last Mammogram ___] : Last Mammogram was [unfilled] [Last Colonoscopy ___] : Last colonoscopy [unfilled] [Last Pap ___] : Last cervical pap smear was [unfilled] [Reproductive Age] : is of reproductive age [de-identified] : PELVIC U/S 07/12/2019

## 2019-07-25 ENCOUNTER — APPOINTMENT (OUTPATIENT)
Dept: GASTROENTEROLOGY | Facility: CLINIC | Age: 64
End: 2019-07-25
Payer: MEDICAID

## 2019-07-25 VITALS
WEIGHT: 130 LBS | HEART RATE: 83 BPM | HEIGHT: 63 IN | SYSTOLIC BLOOD PRESSURE: 125 MMHG | BODY MASS INDEX: 23.04 KG/M2 | OXYGEN SATURATION: 98 % | DIASTOLIC BLOOD PRESSURE: 73 MMHG | RESPIRATION RATE: 14 BRPM

## 2019-07-25 PROCEDURE — 99214 OFFICE O/P EST MOD 30 MIN: CPT

## 2019-07-25 NOTE — HISTORY OF PRESENT ILLNESS
[None] : had no significant interval events [Heartburn] : stable heartburn [Nausea] : denies nausea [Vomiting] : denies vomiting [Diarrhea] : denies diarrhea [Constipation] : denies constipation [Yellow Skin Or Eyes (Jaundice)] : denies jaundice [Abdominal Pain] : stable abdominal pain [Abdominal Swelling] : denies abdominal swelling [Rectal Pain] : denies rectal pain [GERD] : gastroesophageal reflux disease [Abdominal Surgery] : abdominal surgery [Wt Gain ___ Lbs] : no recent weight gain [Wt Loss ___ Lbs] : no recent weight loss [Hiatus Hernia] : no hiatus hernia [Peptic Ulcer Disease] : no peptic ulcer disease [Pancreatitis] : no pancreatitis [Cholelithiasis] : no cholelithiasis [Kidney Stone] : no kidney stone [Inflammatory Bowel Disease] : no inflammatory bowel disease [Irritable Bowel Syndrome] : no irritable bowel syndrome [Diverticulitis] : no diverticulitis [Alcohol Abuse] : no alcohol abuse [Malignancy] : no malignancy [Appendectomy] : no appendectomy [Cholecystectomy] : no cholecystectomy [de-identified] : April 2019 [de-identified] : ordering lab work and sonogram [de-identified] : Patient presents today for followup evaluation of chronic functional dyspepsia status post multiple upper endoscopies the latest being in December of 2018 with negative gastric biopsies for H. pylori. Status post negative colonoscopy in 2017 and status post a recent negative abdominal sonogram done in June of this year. She had laboratory in May of this year which showed completely normal liver function tests and CBC. She presents today with her usual complaints of postprandial dyspepsia with gas and bloating. Liquid sucralfate which was prescribed at her last visit was not covered by her prescription plan.

## 2019-07-25 NOTE — REVIEW OF SYSTEMS
[As Noted in HPI] : as noted in HPI [Abdominal Pain] : abdominal pain [Negative] : Heme/Lymph [Vomiting] : no vomiting [Constipation] : no constipation [Diarrhea] : no diarrhea [Heartburn] : no heartburn [Melena] : no melena [FreeTextEntry7] : postprandial dyspepsia

## 2019-07-25 NOTE — PHYSICAL EXAM
[General Appearance - Alert] : alert [General Appearance - In No Acute Distress] : in no acute distress [General Appearance - Well Nourished] : well nourished [General Appearance - Well Developed] : well developed [General Appearance - Well-Appearing] : healthy appearing [Sclera] : the sclera and conjunctiva were normal [PERRL With Normal Accommodation] : pupils were equal in size, round, and reactive to light [Extraocular Movements] : extraocular movements were intact [Outer Ear] : the ears and nose were normal in appearance [Hearing Threshold Finger Rub Not [place]] : hearing was normal [Examination Of The Oral Cavity] : the lips and gums were normal [Neck Appearance] : the appearance of the neck was normal [Neck Cervical Mass (___cm)] : no neck mass was observed [Jugular Venous Distention Increased] : there was no jugular-venous distention [Thyroid Diffuse Enlargement] : the thyroid was not enlarged [Exaggerated Use Of Accessory Muscles For Inspiration] : no accessory muscle use [Auscultation Breath Sounds / Voice Sounds] : lungs were clear to auscultation bilaterally [Heart Rate And Rhythm] : heart rate was normal and rhythm regular [Heart Sounds] : normal S1 and S2 [Heart Sounds Gallop] : no gallops [Murmurs] : no murmurs [Bowel Sounds] : normal bowel sounds [Abdomen Soft] : soft [] : no hepato-splenomegaly [Abdomen Mass (___ Cm)] : no abdominal mass palpated [Epigastric] : in the epigastric area [RUQ] : in the right upper quadrant [Normal Sphincter Tone] : normal sphincter tone [No Rectal Mass] : no rectal mass [No CVA Tenderness] : no ~M costovertebral angle tenderness [Abnormal Walk] : normal gait [Skin Color & Pigmentation] : normal skin color and pigmentation [Skin Turgor] : normal skin turgor [Oriented To Time, Place, And Person] : oriented to person, place, and time [Periumbilical] : not in the periumbilical area [Suprapubic] : not in the suprapubic area [RLQ] : not in the right lower quadrant [LUQ] : not in the left upper quadrant [LLQ] : not in the left lower quadrant [Internal Hemorrhoid] : no internal hemorrhoids [External Hemorrhoid] : no external hemorrhoids [Occult Blood Positive] : stool was negative for occult blood [FreeTextEntry1] : the exam was chaperoned

## 2019-07-25 NOTE — ASSESSMENT
[FreeTextEntry1] : Impression: Chronic postprandial dyspepsia likely functional in etiology given negative workup as outlined above.\par \par Recommendations: Carafate 1 g tablets dissolved in water to be taken 4 times a day and dicyclomine 20 mg to be taken 4 times a day as needed were prescribed for the patient today. Low-fat antireflux diet was also recommended and explained to the patient today who was advised to return here in 3 months for followup evaluation. She appeared to understand the above instructions management plan which were relayed to her in Israeli by her granddaughter who accompanied the patient today who spoke both Israeli and English.

## 2019-08-01 ENCOUNTER — APPOINTMENT (OUTPATIENT)
Dept: OBGYN | Facility: CLINIC | Age: 64
End: 2019-08-01
Payer: MEDICAID

## 2019-08-01 ENCOUNTER — APPOINTMENT (OUTPATIENT)
Dept: OBGYN | Facility: CLINIC | Age: 64
End: 2019-08-01

## 2019-08-01 VITALS
SYSTOLIC BLOOD PRESSURE: 130 MMHG | BODY MASS INDEX: 23.21 KG/M2 | HEIGHT: 63 IN | WEIGHT: 131 LBS | DIASTOLIC BLOOD PRESSURE: 82 MMHG

## 2019-08-01 DIAGNOSIS — Z80.6 FAMILY HISTORY OF LEUKEMIA: ICD-10-CM

## 2019-08-01 DIAGNOSIS — I83.813 VARICOSE VEINS OF BILATERAL LOWER EXTREMITIES WITH PAIN: ICD-10-CM

## 2019-08-01 DIAGNOSIS — Z83.3 FAMILY HISTORY OF DIABETES MELLITUS: ICD-10-CM

## 2019-08-01 PROCEDURE — 99396 PREV VISIT EST AGE 40-64: CPT

## 2019-08-01 RX ORDER — POLYETHYLENE GLYOCOL 3350, SODIUM CHLORIDE, SODIUM BICARBONATE AND POTASSIUM CHLORIDE 420; 11.2; 5.72; 1.48 G/4L; G/4L; G/4L; G/4L
420 POWDER, FOR SOLUTION NASOGASTRIC; ORAL
Qty: 1 | Refills: 0 | Status: COMPLETED | COMMUNITY
Start: 2017-06-20 | End: 2019-08-01

## 2019-08-01 RX ORDER — TERCONAZOLE 4 MG/G
0.4 CREAM VAGINAL
Qty: 1 | Refills: 1 | Status: COMPLETED | COMMUNITY
Start: 2019-07-09 | End: 2019-08-01

## 2019-08-01 RX ORDER — CLOTRIMAZOLE 10 MG/G
1 CREAM TOPICAL TWICE DAILY
Qty: 20 | Refills: 0 | Status: COMPLETED | COMMUNITY
Start: 2018-05-16 | End: 2019-08-01

## 2019-08-01 RX ORDER — BETAMETHASONE DIPROPIONATE 0.5 MG/G
0.05 CREAM TOPICAL TWICE DAILY
Qty: 5 | Refills: 3 | Status: COMPLETED | COMMUNITY
Start: 2018-06-22 | End: 2019-08-01

## 2019-08-01 RX ORDER — LISINOPRIL 2.5 MG/1
2.5 TABLET ORAL
Refills: 0 | Status: COMPLETED | COMMUNITY
End: 2019-08-01

## 2019-08-01 RX ORDER — MV-MIN/FOLIC/VIT K/LYCOP/COQ10 200-100MCG
CAPSULE ORAL
Refills: 0 | Status: COMPLETED | COMMUNITY
End: 2019-08-01

## 2019-08-01 RX ORDER — BETAMETHASONE DIPROPIONATE 0.5 MG/G
0.05 CREAM, AUGMENTED TOPICAL TWICE DAILY
Qty: 20 | Refills: 0 | Status: COMPLETED | COMMUNITY
Start: 2018-05-16 | End: 2019-08-01

## 2019-08-01 RX ORDER — SUCRALFATE 1 G/10ML
1 SUSPENSION ORAL 4 TIMES DAILY
Qty: 1200 | Refills: 5 | Status: COMPLETED | COMMUNITY
Start: 2019-04-11 | End: 2019-08-01

## 2019-08-01 RX ORDER — SIMVASTATIN 20 MG/1
20 TABLET, FILM COATED ORAL
Refills: 0 | Status: COMPLETED | COMMUNITY
End: 2019-08-01

## 2019-08-01 RX ORDER — CHOLECALCIFEROL (VITAMIN D3) 50 MCG
2000 CAPSULE ORAL
Refills: 0 | Status: COMPLETED | COMMUNITY
End: 2019-08-01

## 2019-08-01 RX ORDER — DICYCLOMINE HYDROCHLORIDE 20 MG/1
20 TABLET ORAL EVERY 6 HOURS
Qty: 120 | Refills: 5 | Status: COMPLETED | COMMUNITY
Start: 2019-02-21 | End: 2019-08-01

## 2019-08-01 RX ORDER — GABAPENTIN 300 MG/1
300 CAPSULE ORAL
Qty: 30 | Refills: 3 | Status: COMPLETED | COMMUNITY
Start: 2017-08-29 | End: 2019-08-01

## 2019-08-01 NOTE — PHYSICAL EXAM
[Awake] : awake [Alert] : alert [Acute Distress] : no acute distress [Mass] : no breast mass [Nipple Discharge] : no nipple discharge [Soft] : soft [Axillary LAD] : no axillary lymphadenopathy [Distended] : not distended [Tender] : non tender [Oriented x3] : oriented to person, place, and time [Depressed Mood] : not depressed [Flat Affect] : affect not flat [Normal] : uterus [Labia Majora] : labia major [Atrophy] : atrophy [Labia Minora] : labia minora [No Bleeding] : there was no active vaginal bleeding [Tenderness] : nontender [Pap Obtained] : a Pap smear was performed [No Tenderness] : no rectal tenderness [Uterine Adnexae] : were not tender and not enlarged [Occult Blood] : occult blood test from digital rectal exam was negative

## 2019-08-01 NOTE — HISTORY OF PRESENT ILLNESS
[Good] : being in good health [___ Month(s) Ago] : [unfilled] month(s) ago [Healthy Diet] : a healthy diet [Regular Exercise] : regular exercise [Last Pap ___] : Last cervical pap smear was [unfilled] [unknown] : the patient is unsure of the date of her LMP [Menarche Age: ____] : age at menarche was [unfilled] [Sexually Active] : is sexually active [Monogamous] : is monogamous [Male ___] : [unfilled] male [No] : no [Last Mammogram ___] : Last Mammogram was [unfilled] [Itching] : no itching [Burning] : no burning [Mass] : no mass [Stinging] : no stinging [Lesion] : no lesion [Soreness] : no soreness [Discharge] : no discharge [Localized Pain] : no localized pain [Mass (___cm)] : no palpable mass [Diffused Pain] : no diffused pain [Nipple Discharge] : no nipple discharge [Hot Flashes] : no hot flashes [Skin Color Change] : no skin color change [Night Sweats] : no night sweats [Vaginal Itching] : no vaginal itching [Dyspareunia] : no dyspareunia [Mood Changes] : no mood changes [Contraception] : does not use contraception

## 2019-08-01 NOTE — REVIEW OF SYSTEMS
[Chills] : no chills [Fever] : no fever [Dyspnea] : no shortness of breath [Chest Pain] : no chest pain [Abdominal Pain] : no abdominal pain [Vomiting] : no vomiting [Dysuria] : no dysuria [Pelvic Pain] : no pelvic pain [Abn Vag Bleeding] : no abnormal vaginal bleeding

## 2019-08-02 LAB — HPV HIGH+LOW RISK DNA PNL CVX: NOT DETECTED

## 2019-08-07 LAB — CYTOLOGY CVX/VAG DOC THIN PREP: NORMAL

## 2019-10-19 ENCOUNTER — EMERGENCY (EMERGENCY)
Facility: HOSPITAL | Age: 64
LOS: 1 days | Discharge: HOME | End: 2019-10-19
Attending: EMERGENCY MEDICINE
Payer: MEDICAID

## 2019-10-19 VITALS
RESPIRATION RATE: 20 BRPM | OXYGEN SATURATION: 99 % | DIASTOLIC BLOOD PRESSURE: 81 MMHG | SYSTOLIC BLOOD PRESSURE: 148 MMHG | HEART RATE: 91 BPM

## 2019-10-19 VITALS
TEMPERATURE: 99 F | OXYGEN SATURATION: 100 % | WEIGHT: 138.01 LBS | SYSTOLIC BLOOD PRESSURE: 177 MMHG | HEIGHT: 63 IN | DIASTOLIC BLOOD PRESSURE: 85 MMHG | RESPIRATION RATE: 20 BRPM | HEART RATE: 100 BPM

## 2019-10-19 PROCEDURE — 99283 EMERGENCY DEPT VISIT LOW MDM: CPT

## 2019-10-19 PROCEDURE — 82962 GLUCOSE BLOOD TEST: CPT

## 2019-10-19 RX ADMIN — Medication 1 TABLET(S): at 19:21

## 2019-10-19 NOTE — ED STATDOCS - PATIENT PORTAL LINK FT
You can access the FollowMyHealth Patient Portal offered by [facility] by registering at the following website: [email or web address]. By joining [employer]’s FollowMyHealth portal, you will also be able to view your health information using other applications (apps) compatible with our system.

## 2019-10-19 NOTE — ED ADULT TRIAGE NOTE - CHIEF COMPLAINT QUOTE
Pt with c/o fever/chills, body aches, congestion, HA. Pt medicated with tylenol at approx noon and mask placed on pt.

## 2019-10-19 NOTE — ED STATDOCS - NS_ ATTENDINGSCRIBEDETAILS _ED_A_ED_FT
I, Herminio Cuenca, performed the initial face to face bedside interview with this patient regarding history of present illness, review of symptoms and relevant past medical, social and family history.  I completed an independent physical examination.  I was the initial provider who evaluated this patient. I have signed out the follow up of any pending tests (i.e. labs, radiological studies) to the ACP.  I have communicated the patient’s plan of care and disposition with the ACP.  The history, relevant review of systems, past medical and surgical history, medical decision making, and physical examination was documented by the scribe in my presence and I attest to the accuracy of the documentation. I, Herminio Cuenca, performed the initial face to face bedside interview with this patient regarding history of present illness, review of symptoms and relevant past medical, social and family history.  I completed an independent physical examination.   The history, relevant review of systems, past medical and surgical history, medical decision making, and physical examination was documented by the scribe in my presence and I attest to the accuracy of the documentation.

## 2019-10-22 ENCOUNTER — APPOINTMENT (OUTPATIENT)
Dept: GASTROENTEROLOGY | Facility: CLINIC | Age: 64
End: 2019-10-22

## 2019-12-01 ENCOUNTER — OUTPATIENT (OUTPATIENT)
Dept: OUTPATIENT SERVICES | Facility: HOSPITAL | Age: 64
LOS: 1 days | End: 2019-12-01
Payer: MEDICAID

## 2019-12-01 PROCEDURE — G9001: CPT

## 2019-12-13 ENCOUNTER — EMERGENCY (EMERGENCY)
Facility: HOSPITAL | Age: 64
LOS: 1 days | Discharge: HOME | End: 2019-12-13
Attending: EMERGENCY MEDICINE
Payer: MEDICAID

## 2019-12-13 VITALS
DIASTOLIC BLOOD PRESSURE: 59 MMHG | HEIGHT: 63 IN | RESPIRATION RATE: 20 BRPM | WEIGHT: 138.01 LBS | OXYGEN SATURATION: 97 % | TEMPERATURE: 99 F | HEART RATE: 100 BPM | SYSTOLIC BLOOD PRESSURE: 97 MMHG

## 2019-12-13 DIAGNOSIS — Z98.891 HISTORY OF UTERINE SCAR FROM PREVIOUS SURGERY: Chronic | ICD-10-CM

## 2019-12-13 DIAGNOSIS — Z98.51 TUBAL LIGATION STATUS: Chronic | ICD-10-CM

## 2019-12-13 LAB
ALBUMIN SERPL ELPH-MCNC: 4.7 G/DL — SIGNIFICANT CHANGE UP (ref 3.3–5.2)
ALP SERPL-CCNC: 90 U/L — SIGNIFICANT CHANGE UP (ref 40–120)
ALT FLD-CCNC: 13 U/L — SIGNIFICANT CHANGE UP
ANION GAP SERPL CALC-SCNC: 18 MMOL/L — HIGH (ref 5–17)
APPEARANCE UR: CLEAR — SIGNIFICANT CHANGE UP
APTT BLD: 25.9 SEC — LOW (ref 27.5–36.3)
AST SERPL-CCNC: 17 U/L — SIGNIFICANT CHANGE UP
BACTERIA # UR AUTO: ABNORMAL
BASOPHILS # BLD AUTO: 0.03 K/UL — SIGNIFICANT CHANGE UP (ref 0–0.2)
BASOPHILS NFR BLD AUTO: 0.2 % — SIGNIFICANT CHANGE UP (ref 0–2)
BILIRUB SERPL-MCNC: 0.6 MG/DL — SIGNIFICANT CHANGE UP (ref 0.4–2)
BILIRUB UR-MCNC: NEGATIVE — SIGNIFICANT CHANGE UP
BUN SERPL-MCNC: 15 MG/DL — SIGNIFICANT CHANGE UP (ref 8–20)
CALCIUM SERPL-MCNC: 9.5 MG/DL — SIGNIFICANT CHANGE UP (ref 8.6–10.2)
CHLORIDE SERPL-SCNC: 94 MMOL/L — LOW (ref 98–107)
CO2 SERPL-SCNC: 21 MMOL/L — LOW (ref 22–29)
COLOR SPEC: YELLOW — SIGNIFICANT CHANGE UP
CREAT SERPL-MCNC: 0.68 MG/DL — SIGNIFICANT CHANGE UP (ref 0.5–1.3)
DIFF PNL FLD: ABNORMAL
EOSINOPHIL # BLD AUTO: 0.02 K/UL — SIGNIFICANT CHANGE UP (ref 0–0.5)
EOSINOPHIL NFR BLD AUTO: 0.2 % — SIGNIFICANT CHANGE UP (ref 0–6)
EPI CELLS # UR: SIGNIFICANT CHANGE UP
FLU A RESULT: SIGNIFICANT CHANGE UP
FLU A RESULT: SIGNIFICANT CHANGE UP
FLUAV AG NPH QL: SIGNIFICANT CHANGE UP
FLUBV AG NPH QL: SIGNIFICANT CHANGE UP
GLUCOSE BLDC GLUCOMTR-MCNC: 172 MG/DL — HIGH (ref 70–99)
GLUCOSE BLDC GLUCOMTR-MCNC: 266 MG/DL — HIGH (ref 70–99)
GLUCOSE SERPL-MCNC: 339 MG/DL — HIGH (ref 70–115)
GLUCOSE UR QL: 1000 MG/DL
HBA1C BLD-MCNC: 10.5 % — HIGH (ref 4–5.6)
HCT VFR BLD CALC: 45.9 % — HIGH (ref 34.5–45)
HGB BLD-MCNC: 15.3 G/DL — SIGNIFICANT CHANGE UP (ref 11.5–15.5)
IMM GRANULOCYTES NFR BLD AUTO: 0.5 % — SIGNIFICANT CHANGE UP (ref 0–1.5)
INR BLD: 1.15 RATIO — SIGNIFICANT CHANGE UP (ref 0.88–1.16)
KETONES UR-MCNC: ABNORMAL
LACTATE BLDV-MCNC: 0.9 MMOL/L — SIGNIFICANT CHANGE UP (ref 0.5–2)
LACTATE BLDV-MCNC: 2.4 MMOL/L — HIGH (ref 0.5–2)
LEUKOCYTE ESTERASE UR-ACNC: ABNORMAL
LYMPHOCYTES # BLD AUTO: 0.79 K/UL — LOW (ref 1–3.3)
LYMPHOCYTES # BLD AUTO: 6 % — LOW (ref 13–44)
MCHC RBC-ENTMCNC: 28.4 PG — SIGNIFICANT CHANGE UP (ref 27–34)
MCHC RBC-ENTMCNC: 33.3 GM/DL — SIGNIFICANT CHANGE UP (ref 32–36)
MCV RBC AUTO: 85.3 FL — SIGNIFICANT CHANGE UP (ref 80–100)
MONOCYTES # BLD AUTO: 0.55 K/UL — SIGNIFICANT CHANGE UP (ref 0–0.9)
MONOCYTES NFR BLD AUTO: 4.2 % — SIGNIFICANT CHANGE UP (ref 2–14)
NEUTROPHILS # BLD AUTO: 11.63 K/UL — HIGH (ref 1.8–7.4)
NEUTROPHILS NFR BLD AUTO: 88.9 % — HIGH (ref 43–77)
NITRITE UR-MCNC: POSITIVE
PH UR: 6 — SIGNIFICANT CHANGE UP (ref 5–8)
PLATELET # BLD AUTO: 177 K/UL — SIGNIFICANT CHANGE UP (ref 150–400)
POTASSIUM SERPL-MCNC: 3.9 MMOL/L — SIGNIFICANT CHANGE UP (ref 3.5–5.3)
POTASSIUM SERPL-SCNC: 3.9 MMOL/L — SIGNIFICANT CHANGE UP (ref 3.5–5.3)
PROT SERPL-MCNC: 8.5 G/DL — SIGNIFICANT CHANGE UP (ref 6.6–8.7)
PROT UR-MCNC: 30 MG/DL
PROTHROM AB SERPL-ACNC: 13.3 SEC — HIGH (ref 10–12.9)
RBC # BLD: 5.38 M/UL — HIGH (ref 3.8–5.2)
RBC # FLD: 12.2 % — SIGNIFICANT CHANGE UP (ref 10.3–14.5)
RBC CASTS # UR COMP ASSIST: SIGNIFICANT CHANGE UP /HPF (ref 0–4)
RSV RESULT: SIGNIFICANT CHANGE UP
RSV RNA RESP QL NAA+PROBE: SIGNIFICANT CHANGE UP
SODIUM SERPL-SCNC: 133 MMOL/L — LOW (ref 135–145)
SP GR SPEC: 1.01 — SIGNIFICANT CHANGE UP (ref 1.01–1.02)
UROBILINOGEN FLD QL: NEGATIVE — SIGNIFICANT CHANGE UP
WBC # BLD: 13.08 K/UL — HIGH (ref 3.8–10.5)
WBC # FLD AUTO: 13.08 K/UL — HIGH (ref 3.8–10.5)
WBC UR QL: >50

## 2019-12-13 PROCEDURE — 99218: CPT

## 2019-12-13 PROCEDURE — 93010 ELECTROCARDIOGRAM REPORT: CPT

## 2019-12-13 PROCEDURE — 74177 CT ABD & PELVIS W/CONTRAST: CPT | Mod: 26

## 2019-12-13 PROCEDURE — 71045 X-RAY EXAM CHEST 1 VIEW: CPT | Mod: 26

## 2019-12-13 RX ORDER — DEXTROSE 50 % IN WATER 50 %
25 SYRINGE (ML) INTRAVENOUS ONCE
Refills: 0 | Status: DISCONTINUED | OUTPATIENT
Start: 2019-12-13 | End: 2019-12-18

## 2019-12-13 RX ORDER — SODIUM CHLORIDE 9 MG/ML
1950 INJECTION INTRAMUSCULAR; INTRAVENOUS; SUBCUTANEOUS ONCE
Refills: 0 | Status: COMPLETED | OUTPATIENT
Start: 2019-12-13 | End: 2019-12-13

## 2019-12-13 RX ORDER — CEFTRIAXONE 500 MG/1
1000 INJECTION, POWDER, FOR SOLUTION INTRAMUSCULAR; INTRAVENOUS ONCE
Refills: 0 | Status: COMPLETED | OUTPATIENT
Start: 2019-12-13 | End: 2019-12-13

## 2019-12-13 RX ORDER — ONDANSETRON 8 MG/1
8 TABLET, FILM COATED ORAL ONCE
Refills: 0 | Status: COMPLETED | OUTPATIENT
Start: 2019-12-13 | End: 2019-12-13

## 2019-12-13 RX ORDER — CEFTRIAXONE 500 MG/1
1000 INJECTION, POWDER, FOR SOLUTION INTRAMUSCULAR; INTRAVENOUS EVERY 24 HOURS
Refills: 0 | Status: DISCONTINUED | OUTPATIENT
Start: 2019-12-14 | End: 2019-12-18

## 2019-12-13 RX ORDER — ACETAMINOPHEN 500 MG
650 TABLET ORAL EVERY 6 HOURS
Refills: 0 | Status: DISCONTINUED | OUTPATIENT
Start: 2019-12-13 | End: 2019-12-18

## 2019-12-13 RX ORDER — LEVOTHYROXINE SODIUM 125 MCG
100 TABLET ORAL DAILY
Refills: 0 | Status: DISCONTINUED | OUTPATIENT
Start: 2019-12-13 | End: 2019-12-18

## 2019-12-13 RX ORDER — ONDANSETRON 8 MG/1
4 TABLET, FILM COATED ORAL EVERY 8 HOURS
Refills: 0 | Status: DISCONTINUED | OUTPATIENT
Start: 2019-12-13 | End: 2019-12-18

## 2019-12-13 RX ORDER — ACETAMINOPHEN 500 MG
650 TABLET ORAL ONCE
Refills: 0 | Status: COMPLETED | OUTPATIENT
Start: 2019-12-13 | End: 2019-12-13

## 2019-12-13 RX ORDER — GLUCAGON INJECTION, SOLUTION 0.5 MG/.1ML
1 INJECTION, SOLUTION SUBCUTANEOUS ONCE
Refills: 0 | Status: DISCONTINUED | OUTPATIENT
Start: 2019-12-13 | End: 2019-12-18

## 2019-12-13 RX ORDER — INSULIN GLARGINE 100 [IU]/ML
14 INJECTION, SOLUTION SUBCUTANEOUS AT BEDTIME
Refills: 0 | Status: DISCONTINUED | OUTPATIENT
Start: 2019-12-13 | End: 2019-12-18

## 2019-12-13 RX ORDER — DEXTROSE 50 % IN WATER 50 %
12.5 SYRINGE (ML) INTRAVENOUS ONCE
Refills: 0 | Status: DISCONTINUED | OUTPATIENT
Start: 2019-12-13 | End: 2019-12-18

## 2019-12-13 RX ORDER — DEXTROSE 50 % IN WATER 50 %
15 SYRINGE (ML) INTRAVENOUS ONCE
Refills: 0 | Status: DISCONTINUED | OUTPATIENT
Start: 2019-12-13 | End: 2019-12-18

## 2019-12-13 RX ORDER — SODIUM CHLORIDE 9 MG/ML
1000 INJECTION, SOLUTION INTRAVENOUS
Refills: 0 | Status: DISCONTINUED | OUTPATIENT
Start: 2019-12-13 | End: 2019-12-18

## 2019-12-13 RX ADMIN — SODIUM CHLORIDE 1950 MILLILITER(S): 9 INJECTION INTRAMUSCULAR; INTRAVENOUS; SUBCUTANEOUS at 16:29

## 2019-12-13 RX ADMIN — CEFTRIAXONE 1000 MILLIGRAM(S): 500 INJECTION, POWDER, FOR SOLUTION INTRAMUSCULAR; INTRAVENOUS at 09:30

## 2019-12-13 RX ADMIN — Medication 650 MILLIGRAM(S): at 09:59

## 2019-12-13 RX ADMIN — Medication 650 MILLIGRAM(S): at 08:59

## 2019-12-13 RX ADMIN — ONDANSETRON 8 MILLIGRAM(S): 8 TABLET, FILM COATED ORAL at 08:59

## 2019-12-13 RX ADMIN — SODIUM CHLORIDE 1950 MILLILITER(S): 9 INJECTION INTRAMUSCULAR; INTRAVENOUS; SUBCUTANEOUS at 08:59

## 2019-12-13 RX ADMIN — CEFTRIAXONE 100 MILLIGRAM(S): 500 INJECTION, POWDER, FOR SOLUTION INTRAMUSCULAR; INTRAVENOUS at 09:00

## 2019-12-13 RX ADMIN — Medication 100 MICROGRAM(S): at 17:06

## 2019-12-13 RX ADMIN — INSULIN GLARGINE 14 UNIT(S): 100 INJECTION, SOLUTION SUBCUTANEOUS at 22:54

## 2019-12-13 NOTE — ED STATDOCS - OBJECTIVE STATEMENT
65y/o F with PMHx of DM presents to the ED c/o fever, sore throat and body aches, 1 day ago. Assoc. sx of vomiting and dizziness. Additionally, patient c/o dysuria. Pt reports that she previously presented to the ED for similar symptoms, was dx with a virus and sent home, although then sent back for positive bacterial cultures. Denies cough or hematuria. No additional complaints at this time.   : Ela 63y/o F with PMHx of DM presents to the ED c/o fever, sore throat and body aches, 1 day ago. Assoc. sx of vomiting and dizziness. Additionally, patient c/o dysuria. Pt reports that she previously presented to the ED for similar symptoms, was dx with a virus and sent home, although then sent back for positive bacterial cultures. Denies cough or hematuria. No additional complaints at this time.

## 2019-12-13 NOTE — ED STATDOCS - ATTENDING CONTRIBUTION TO CARE
I, Daniel Meyers, performed the initial face to face bedside interview with this patient regarding history of present illness, review of symptoms and relevant past medical, social and family history.  I completed an independent physical examination.  I was the initial provider who evaluated this patient. I have signed out the follow up of any pending tests (i.e. labs, radiological studies) to the ACP.  I have communicated the patient’s plan of care and disposition with the ACP.  The history, relevant review of systems, past medical and surgical history, medical decision making, and physical examination was documented by the scribe in my presence and I attest to the accuracy of the documentation.

## 2019-12-13 NOTE — ED STATDOCS - CLINICAL SUMMARY MEDICAL DECISION MAKING FREE TEXT BOX
65y/o F with DM p/w fever, chills, body aches, sore throat, dysuria concern for viral illness vs UTI. Do labs, UA and reevaul

## 2019-12-13 NOTE — ED CDU PROVIDER INITIAL DAY NOTE - PROGRESS NOTE DETAILS
64 F with PMHx DM, hypothyroid, HTN presents to ED c/o fever (subjective), NBNB vomiting, dysuria and lightheadedness. Denies hx of kidney stones. Has had positive blood cultures in past. PE: mild distress, oropharynx mild erythema without vesicle or exudate, MMM, RRR, lungs CTA, pulse 2+ B/l, abd soft with suprapubic TTP, ND, no guarding, R CVAT, moves all extremities spontaneously/symmetrically, ambulates steady gait.   no hx kidney stones  PMD: Oli Garcia  ED  Ela SHIFT CHANGE: Pt signed out to MARILOU Price, pending CT abd/pelvis, repeat AM labs, IV abx, re-evaluation.

## 2019-12-13 NOTE — ED STATDOCS - PROGRESS NOTE DETAILS
NP NOTE: Chart reviewed.  + UTI with right flank pain, not tolerating po.  Will place in observation for IV abx, hydration.

## 2019-12-13 NOTE — ED CDU PROVIDER INITIAL DAY NOTE - ATTENDING CONTRIBUTION TO CARE
seen with acp: non toxic appearing, pleasant adult female, hx of pyelo; p/w left flank pain and dysuria; results c/w UTI and symptoms c/w pyelo; on exam no icterus, no abd pain, mild left cva ttp; normal heart and lungs, no pedal edema; place in obs for hydration, antibx ceftriaxone q 24; ct scan to r/o urinary obstruction, stricture, or renal abscess given hx of frequent pyelo/uti; reassess

## 2019-12-13 NOTE — ED CDU PROVIDER INITIAL DAY NOTE - OBJECTIVE STATEMENT
63y/o F with PMHx of DM presents to the ED c/o fever, sore throat and body aches, 1 day ago. Assoc. sx of vomiting and dizziness. Additionally, patient c/o dysuria. Pt reports that she previously presented to the ED for similar symptoms, was dx with a virus and sent home, although then sent back for positive bacterial cultures.  Labs with UTI, right CVA tenderness, ceftriaxone IV given.  Hydrated,  Not tolerating po.

## 2019-12-13 NOTE — ED CDU PROVIDER INITIAL DAY NOTE - MEDICAL DECISION MAKING DETAILS
63 y/o F with UTI, being treated for pyelonephritis.  Continue ceftriaxone, possible CT scan as per Dr Razo

## 2019-12-14 VITALS
RESPIRATION RATE: 16 BRPM | HEART RATE: 77 BPM | SYSTOLIC BLOOD PRESSURE: 102 MMHG | OXYGEN SATURATION: 100 % | TEMPERATURE: 98 F | DIASTOLIC BLOOD PRESSURE: 60 MMHG

## 2019-12-14 LAB
ALBUMIN SERPL ELPH-MCNC: 4.1 G/DL — SIGNIFICANT CHANGE UP (ref 3.3–5.2)
ALP SERPL-CCNC: 75 U/L — SIGNIFICANT CHANGE UP (ref 40–120)
ALT FLD-CCNC: 12 U/L — SIGNIFICANT CHANGE UP
ANION GAP SERPL CALC-SCNC: 12 MMOL/L — SIGNIFICANT CHANGE UP (ref 5–17)
AST SERPL-CCNC: 16 U/L — SIGNIFICANT CHANGE UP
BILIRUB SERPL-MCNC: 0.4 MG/DL — SIGNIFICANT CHANGE UP (ref 0.4–2)
BUN SERPL-MCNC: 8 MG/DL — SIGNIFICANT CHANGE UP (ref 8–20)
CALCIUM SERPL-MCNC: 9.3 MG/DL — SIGNIFICANT CHANGE UP (ref 8.6–10.2)
CHLORIDE SERPL-SCNC: 98 MMOL/L — SIGNIFICANT CHANGE UP (ref 98–107)
CO2 SERPL-SCNC: 26 MMOL/L — SIGNIFICANT CHANGE UP (ref 22–29)
CREAT SERPL-MCNC: 0.54 MG/DL — SIGNIFICANT CHANGE UP (ref 0.5–1.3)
GLUCOSE BLDC GLUCOMTR-MCNC: 313 MG/DL — HIGH (ref 70–99)
GLUCOSE SERPL-MCNC: 172 MG/DL — HIGH (ref 70–115)
HCT VFR BLD CALC: 42.5 % — SIGNIFICANT CHANGE UP (ref 34.5–45)
HGB BLD-MCNC: 14.3 G/DL — SIGNIFICANT CHANGE UP (ref 11.5–15.5)
MCHC RBC-ENTMCNC: 28.5 PG — SIGNIFICANT CHANGE UP (ref 27–34)
MCHC RBC-ENTMCNC: 33.6 GM/DL — SIGNIFICANT CHANGE UP (ref 32–36)
MCV RBC AUTO: 84.7 FL — SIGNIFICANT CHANGE UP (ref 80–100)
PLATELET # BLD AUTO: 153 K/UL — SIGNIFICANT CHANGE UP (ref 150–400)
POTASSIUM SERPL-MCNC: 4.1 MMOL/L — SIGNIFICANT CHANGE UP (ref 3.5–5.3)
POTASSIUM SERPL-SCNC: 4.1 MMOL/L — SIGNIFICANT CHANGE UP (ref 3.5–5.3)
PROT SERPL-MCNC: 7.4 G/DL — SIGNIFICANT CHANGE UP (ref 6.6–8.7)
RBC # BLD: 5.02 M/UL — SIGNIFICANT CHANGE UP (ref 3.8–5.2)
RBC # FLD: 12.3 % — SIGNIFICANT CHANGE UP (ref 10.3–14.5)
SODIUM SERPL-SCNC: 136 MMOL/L — SIGNIFICANT CHANGE UP (ref 135–145)
WBC # BLD: 7.98 K/UL — SIGNIFICANT CHANGE UP (ref 3.8–10.5)
WBC # FLD AUTO: 7.98 K/UL — SIGNIFICANT CHANGE UP (ref 3.8–10.5)

## 2019-12-14 PROCEDURE — 36415 COLL VENOUS BLD VENIPUNCTURE: CPT

## 2019-12-14 PROCEDURE — 87186 SC STD MICRODIL/AGAR DIL: CPT

## 2019-12-14 PROCEDURE — T1013: CPT

## 2019-12-14 PROCEDURE — 96375 TX/PRO/DX INJ NEW DRUG ADDON: CPT

## 2019-12-14 PROCEDURE — 83605 ASSAY OF LACTIC ACID: CPT

## 2019-12-14 PROCEDURE — 99217: CPT

## 2019-12-14 PROCEDURE — 96366 THER/PROPH/DIAG IV INF ADDON: CPT

## 2019-12-14 PROCEDURE — 87631 RESP VIRUS 3-5 TARGETS: CPT

## 2019-12-14 PROCEDURE — 96361 HYDRATE IV INFUSION ADD-ON: CPT

## 2019-12-14 PROCEDURE — 85730 THROMBOPLASTIN TIME PARTIAL: CPT

## 2019-12-14 PROCEDURE — 81001 URINALYSIS AUTO W/SCOPE: CPT

## 2019-12-14 PROCEDURE — 85610 PROTHROMBIN TIME: CPT

## 2019-12-14 PROCEDURE — 71045 X-RAY EXAM CHEST 1 VIEW: CPT

## 2019-12-14 PROCEDURE — 87040 BLOOD CULTURE FOR BACTERIA: CPT

## 2019-12-14 PROCEDURE — 96365 THER/PROPH/DIAG IV INF INIT: CPT | Mod: XU

## 2019-12-14 PROCEDURE — 93005 ELECTROCARDIOGRAM TRACING: CPT

## 2019-12-14 PROCEDURE — 80053 COMPREHEN METABOLIC PANEL: CPT

## 2019-12-14 PROCEDURE — 99284 EMERGENCY DEPT VISIT MOD MDM: CPT | Mod: 25

## 2019-12-14 PROCEDURE — 82962 GLUCOSE BLOOD TEST: CPT

## 2019-12-14 PROCEDURE — 83036 HEMOGLOBIN GLYCOSYLATED A1C: CPT

## 2019-12-14 PROCEDURE — G0378: CPT

## 2019-12-14 PROCEDURE — 85027 COMPLETE CBC AUTOMATED: CPT

## 2019-12-14 PROCEDURE — 87086 URINE CULTURE/COLONY COUNT: CPT

## 2019-12-14 RX ORDER — CEFPODOXIME PROXETIL 100 MG
1 TABLET ORAL
Qty: 20 | Refills: 0
Start: 2019-12-14 | End: 2019-12-23

## 2019-12-14 RX ADMIN — Medication 20 MILLIGRAM(S): at 05:51

## 2019-12-14 RX ADMIN — CEFTRIAXONE 100 MILLIGRAM(S): 500 INJECTION, POWDER, FOR SOLUTION INTRAMUSCULAR; INTRAVENOUS at 09:36

## 2019-12-14 RX ADMIN — Medication 100 MICROGRAM(S): at 05:51

## 2019-12-14 RX ADMIN — CEFTRIAXONE 1000 MILLIGRAM(S): 500 INJECTION, POWDER, FOR SOLUTION INTRAMUSCULAR; INTRAVENOUS at 10:16

## 2019-12-14 NOTE — ED CDU PROVIDER DISPOSITION NOTE - NSFOLLOWUPINSTRUCTIONS_ED_ALL_ED_FT
Pyelonephritis    Pyelonephritis is a kidney infection. In most cases, the infection clears up with treatment and does not cause further problems. More severe infections or chronic infections can sometimes spread to the bloodstream or lead to other problems with the kidneys. Symptoms include frequent or painful urination, abdominal pain, back pain, flank pain, fever/chills, nausea, or vomiting. If you were prescribed an antibiotic medicine, take it as told by your health care provider. Do not stop taking the antibiotic even if you start to feel better.    SEEK IMMEDIATE MEDICAL CARE IF YOU HAVE ANY OF THE FOLLOWING SYMPTOMS: inability to hold down antibiotics or fluids, worsening pain, dizziness/lightheadedness, or change in mental status.    1. TAKE ALL MEDICATIONS AS DIRECTED.  FOR PAIN YOU CAN TAKE IBUPROFEN (MOTRIN, ADVIL) OR ACETAMINOPHEN (TYLENOL) AS NEEDED, AS DIRECTED ON PACKAGING.  2. FOLLOW UP WITH __PMD 1-2 Days________ AS DIRECTED.  YOU WERE GIVEN COPIES OF ALL LABS AND IMAGING RESULTS FROM YOUR ER VISIT--PLEASE TAKE THEM WITH YOU TO YOUR APPOINTMENT.  3. IF NEEDED, CALL [phone] TO FIND A PRIMARY CARE PHYSICIAN.  OR CALL [phone] TO MAKE AN APPOINTMENT WITH THE MEDICAL CLINIC.  4. RETURN TO THE ER FOR ANY WORSENING SYMPTOMS.

## 2019-12-14 NOTE — ED CDU PROVIDER DISPOSITION NOTE - PATIENT PORTAL LINK FT
You can access the FollowMyHealth Patient Portal offered by [facility] by registering at the following website: [email or web address]. By joining [facility]’s FollowMyHealth portal, you will also be able to view your health information using other applications (apps) compatible with our system.

## 2019-12-14 NOTE — ED ADULT NURSE REASSESSMENT NOTE - NS ED NURSE REASSESS COMMENT FT1
Pt alert and oriented. resting in stretcher, no signs of distress noted. Handoff report given to Gauri Haley RN. pt's safety maintained. RN with no questions or concerns at this time
ivf remains infusing into Left forearm, pt verbalized improvement. safety maintained, talking on cellphone sitting in recliner with family bedside
pt sleeping in stretcher, no apparent distress noted. bed in lowest position and safety maintained.
tolerating PO fluids and meals.  resting in stretcher, with no complaints of N/ awaiting CT results. bed in lowest position and call bell within reach, will continue to monitor for any changes.
verbal report received from ED RN Rani, awaiting pt on CDU for observation
pt predominately Turkmen speaking, assessment done with ED  Tamar Kaur. pt A&OX4, resting in stretcher, denies any c/o pain/discomfort. no signs of apparent distress noted at this time.  requesting to eat.  meal/ po fluids provided. awaiting CT results. Plan of care reviewed, pt verbalizes (+) understanding.  bed in lowest position, call bell within reach and safety maintained.
Assumed care of the patient at 0730. Patient A&Ox4, Slovak speaking only. Patient states feels better. minimal pain to RLQ abd. Denies nausea or vomiting. Patient pending lab results and dose of IV abx. PIV patent. Ambulatory. Noted with FS of 313, patient states "I just ate breakfast". Patient in understanding of plan of care. Patient with no further questions for the nurse. Call bell within reach and encouraged to use when assistance needed. Will continue to monitor.

## 2019-12-14 NOTE — ED CDU PROVIDER DISPOSITION NOTE - CLINICAL COURSE
Pt is a 63y/o F with PMHx of DM admitted to observation for pyelo- Pt  given IV Abx, fluids and reports feeling better. Repeat lactate neg and  leukocytosis resolved.  Pt tolerating po and feels well enough for dc home. Will dc home on Vantin, encouraged oral hydration, Motrin for pain/fever and f/u PMD 1-2 days

## 2019-12-14 NOTE — ED CDU PROVIDER SUBSEQUENT DAY NOTE - MEDICAL DECISION MAKING DETAILS
female with abdominal pain found to have uti. significant stool on ct with renal cyst that patient can fu with urology. po trial am and dc with fu

## 2019-12-14 NOTE — ED CDU PROVIDER SUBSEQUENT DAY NOTE - ATTENDING CONTRIBUTION TO CARE
Overnight observation for pyelonephritis on IV antibiotics, symptomatically improved, fever free, reassuring labs.

## 2019-12-14 NOTE — ED CDU PROVIDER SUBSEQUENT DAY NOTE - HISTORY
no acute overnight events. pt remains afebrile  ct with significant stool and renal cyst. to give nephro fu

## 2019-12-16 DIAGNOSIS — Z71.89 OTHER SPECIFIED COUNSELING: ICD-10-CM

## 2020-05-14 ENCOUNTER — APPOINTMENT (OUTPATIENT)
Dept: GASTROENTEROLOGY | Facility: CLINIC | Age: 65
End: 2020-05-14
Payer: MEDICAID

## 2020-05-14 PROCEDURE — 99443: CPT

## 2020-05-14 NOTE — HISTORY OF PRESENT ILLNESS
[Verbal consent obtained from patient] : the patient, [unfilled] [None] : had no significant interval events [Heartburn] : stable heartburn [Nausea] : denies nausea [Vomiting] : denies vomiting [Diarrhea] : denies diarrhea [Constipation] : denies constipation [Yellow Skin Or Eyes (Jaundice)] : denies jaundice [Abdominal Swelling] : denies abdominal swelling [Rectal Pain] : denies rectal pain [Abdominal Pain] : abdominal pain [GERD] : gastroesophageal reflux disease [Abdominal Surgery] : abdominal surgery [Wt Gain ___ Lbs] : no recent weight gain [Wt Loss ___ Lbs] : no recent weight loss [Hiatus Hernia] : no hiatus hernia [Peptic Ulcer Disease] : no peptic ulcer disease [Pancreatitis] : no pancreatitis [Cholelithiasis] : no cholelithiasis [Kidney Stone] : no kidney stone [Inflammatory Bowel Disease] : no inflammatory bowel disease [Irritable Bowel Syndrome] : no irritable bowel syndrome [Alcohol Abuse] : no alcohol abuse [Diverticulitis] : no diverticulitis [Malignancy] : no malignancy [Appendectomy] : no appendectomy [Cholecystectomy] : no cholecystectomy [de-identified] : Patient presents for telephonic followup visit for chief complaint of postprandial right upper quadrant pain. She is status post an upper endoscopy with biopsy done December 2018 which showed moderate H. pylori negative gastritis and had previously had an EGD in 2017 which showed similar findings. She has been on omeprazole 40 mg daily and Carafate 1 g q.i.d. with no symptomatic relief. She did have a negative colonoscopy done in 2017. She states that her pain is worse after eating and travels to the back. She has had no recent abdominal sonogram and still has  her gallbladder. [de-identified] : July 2019

## 2020-05-14 NOTE — REVIEW OF SYSTEMS
[As Noted in HPI] : as noted in HPI [Abdominal Pain] : abdominal pain [Heartburn] : heartburn [Negative] : Heme/Lymph [Constipation] : no constipation [Vomiting] : no vomiting [Melena] : no melena [Diarrhea] : no diarrhea

## 2020-05-14 NOTE — ASSESSMENT
[FreeTextEntry1] : Impression: Chronic postprandial right upper quadrant pain rule out possible symptomatic cholelithiasis.\par \par Recommendations: Patient to be sent for abdominal sonogram to rule out symptomatic cholelithiasis as well as CMP. She was told to make an appointment to physically see me here in the office in 3-4 weeks and appeared to understand all of the above instructions, information, and management plan which were explained to her to Guyanese by myself who speaks Guyanese.

## 2020-05-14 NOTE — PHYSICAL EXAM
[Oriented To Time, Place, And Person] : oriented to person, place, and time [FreeTextEntry1] : Unable to do vital signs or physical examination given the nature of this visit.

## 2020-05-23 LAB
ALBUMIN SERPL ELPH-MCNC: 4.3 G/DL
ALP BLD-CCNC: 82 U/L
ALT SERPL-CCNC: 20 U/L
ANION GAP SERPL CALC-SCNC: 15 MMOL/L
AST SERPL-CCNC: 18 U/L
BILIRUB SERPL-MCNC: 0.2 MG/DL
BUN SERPL-MCNC: 13 MG/DL
CALCIUM SERPL-MCNC: 9.4 MG/DL
CHLORIDE SERPL-SCNC: 95 MMOL/L
CO2 SERPL-SCNC: 22 MMOL/L
CREAT SERPL-MCNC: 0.71 MG/DL
GLUCOSE SERPL-MCNC: 330 MG/DL
POTASSIUM SERPL-SCNC: 4.9 MMOL/L
PROT SERPL-MCNC: 7.1 G/DL
SODIUM SERPL-SCNC: 132 MMOL/L

## 2020-06-03 ENCOUNTER — OUTPATIENT (OUTPATIENT)
Dept: OUTPATIENT SERVICES | Facility: HOSPITAL | Age: 65
LOS: 1 days | End: 2020-06-03
Payer: MEDICAID

## 2020-06-03 ENCOUNTER — APPOINTMENT (OUTPATIENT)
Dept: ULTRASOUND IMAGING | Facility: CLINIC | Age: 65
End: 2020-06-03

## 2020-06-03 DIAGNOSIS — Z98.51 TUBAL LIGATION STATUS: Chronic | ICD-10-CM

## 2020-06-03 DIAGNOSIS — Z00.8 ENCOUNTER FOR OTHER GENERAL EXAMINATION: ICD-10-CM

## 2020-06-03 DIAGNOSIS — R10.11 RIGHT UPPER QUADRANT PAIN: ICD-10-CM

## 2020-06-03 DIAGNOSIS — Z98.891 HISTORY OF UTERINE SCAR FROM PREVIOUS SURGERY: Chronic | ICD-10-CM

## 2020-06-03 PROCEDURE — 76700 US EXAM ABDOM COMPLETE: CPT | Mod: 26

## 2020-06-03 PROCEDURE — 76700 US EXAM ABDOM COMPLETE: CPT

## 2020-06-09 ENCOUNTER — APPOINTMENT (OUTPATIENT)
Dept: GASTROENTEROLOGY | Facility: CLINIC | Age: 65
End: 2020-06-09
Payer: MEDICAID

## 2020-06-09 PROCEDURE — 99443: CPT

## 2020-06-09 NOTE — HISTORY OF PRESENT ILLNESS
[Home] : at home, [unfilled] , at the time of the visit. [Medical Office: ([facility])___] : at the medical office located in  [Verbal consent obtained from patient] : the patient, [unfilled] [Ordering Test(s) ___] : ordering [unfilled] [None] : had no significant interval events [Heartburn] : stable heartburn [Nausea] : denies nausea [Vomiting] : denies vomiting [Diarrhea] : denies diarrhea [Constipation] : denies constipation [Yellow Skin Or Eyes (Jaundice)] : denies jaundice [Abdominal Pain] : stable abdominal pain [Abdominal Swelling] : denies abdominal swelling [Rectal Pain] : denies rectal pain [GERD] : gastroesophageal reflux disease [Abdominal Surgery] : abdominal surgery [Wt Gain ___ Lbs] : no recent weight gain [Wt Loss ___ Lbs] : no recent weight loss [Hiatus Hernia] : no hiatus hernia [Peptic Ulcer Disease] : no peptic ulcer disease [Pancreatitis] : no pancreatitis [Cholelithiasis] : no cholelithiasis [Kidney Stone] : no kidney stone [Inflammatory Bowel Disease] : no inflammatory bowel disease [Irritable Bowel Syndrome] : no irritable bowel syndrome [Diverticulitis] : no diverticulitis [Alcohol Abuse] : no alcohol abuse [Malignancy] : no malignancy [Appendectomy] : no appendectomy [Cholecystectomy] : no cholecystectomy [de-identified] : may 14, 2020 [de-identified] : Patient presents and gives consent for telephonic followup visit for chief complaint of right upper quadrant pain and chronic GERD. She had lab work done recently which showed normal liver chemistries and an abdominal sonogram that showed mild fatty infiltration of her liver with no gallbladder pathology. She also has a history of chronic GERD which is well controlled with omeprazole 20 mg daily and is status post a negative EGD with biopsy within the past 2 years. She was informed of the above abdominal sonogram and lab results today.

## 2020-06-09 NOTE — ASSESSMENT
[FreeTextEntry1] : Impression: Right upper quadrant pain likely secondary to mild hepatic steatosis secondary to patient's body habitus. Chronic GERD well-controlled with current omeprazole therapy.\par \par Recommendations: Weight loss and low-fat diet was recommended for management of her nonalcoholic fatty liver disease. A low-fat antireflux diet was recommended and explained to the patient in Kyrgyz by myself who speaks Kyrgyz. Omeprazole 20 mg daily was renewed today. She was told to return in 6 months for followup evaluation or sooner if necessary and appeared to understand all of the above instructions, information, and management plan.

## 2020-08-25 ENCOUNTER — TRANSCRIPTION ENCOUNTER (OUTPATIENT)
Age: 65
End: 2020-08-25

## 2020-09-01 ENCOUNTER — APPOINTMENT (OUTPATIENT)
Dept: OBGYN | Facility: CLINIC | Age: 65
End: 2020-09-01
Payer: MEDICAID

## 2020-09-01 VITALS
SYSTOLIC BLOOD PRESSURE: 110 MMHG | BODY MASS INDEX: 23.21 KG/M2 | DIASTOLIC BLOOD PRESSURE: 74 MMHG | HEIGHT: 63 IN | WEIGHT: 131 LBS

## 2020-09-01 DIAGNOSIS — Z86.79 PERSONAL HISTORY OF OTHER DISEASES OF THE CIRCULATORY SYSTEM: ICD-10-CM

## 2020-09-01 DIAGNOSIS — Z78.9 OTHER SPECIFIED HEALTH STATUS: ICD-10-CM

## 2020-09-01 DIAGNOSIS — R10.2 PELVIC AND PERINEAL PAIN: ICD-10-CM

## 2020-09-01 DIAGNOSIS — Z86.39 PERSONAL HISTORY OF OTHER ENDOCRINE, NUTRITIONAL AND METABOLIC DISEASE: ICD-10-CM

## 2020-09-01 DIAGNOSIS — I10 ESSENTIAL (PRIMARY) HYPERTENSION: ICD-10-CM

## 2020-09-01 DIAGNOSIS — Z92.89 PERSONAL HISTORY OF OTHER MEDICAL TREATMENT: ICD-10-CM

## 2020-09-01 DIAGNOSIS — Z87.898 PERSONAL HISTORY OF OTHER SPECIFIED CONDITIONS: ICD-10-CM

## 2020-09-01 DIAGNOSIS — I83.893 VARICOSE VEINS OF BILATERAL LOWER EXTREMITIES WITH OTHER COMPLICATIONS: ICD-10-CM

## 2020-09-01 DIAGNOSIS — Z87.442 PERSONAL HISTORY OF URINARY CALCULI: ICD-10-CM

## 2020-09-01 DIAGNOSIS — K21.0 GASTRO-ESOPHAGEAL REFLUX DISEASE WITH ESOPHAGITIS: ICD-10-CM

## 2020-09-01 DIAGNOSIS — Z12.11 ENCOUNTER FOR SCREENING FOR MALIGNANT NEOPLASM OF COLON: ICD-10-CM

## 2020-09-01 DIAGNOSIS — R10.11 RIGHT UPPER QUADRANT PAIN: ICD-10-CM

## 2020-09-01 DIAGNOSIS — Z86.19 PERSONAL HISTORY OF OTHER INFECTIOUS AND PARASITIC DISEASES: ICD-10-CM

## 2020-09-01 DIAGNOSIS — Z87.19 PERSONAL HISTORY OF OTHER DISEASES OF THE DIGESTIVE SYSTEM: ICD-10-CM

## 2020-09-01 DIAGNOSIS — N76.0 ACUTE VAGINITIS: ICD-10-CM

## 2020-09-01 DIAGNOSIS — E78.00 PURE HYPERCHOLESTEROLEMIA, UNSPECIFIED: ICD-10-CM

## 2020-09-01 DIAGNOSIS — Z87.42 PERSONAL HISTORY OF OTHER DISEASES OF THE FEMALE GENITAL TRACT: ICD-10-CM

## 2020-09-01 DIAGNOSIS — K29.00 ACUTE GASTRITIS W/OUT BLEEDING: ICD-10-CM

## 2020-09-01 DIAGNOSIS — Z12.31 ENCOUNTER FOR SCREENING MAMMOGRAM FOR MALIGNANT NEOPLASM OF BREAST: ICD-10-CM

## 2020-09-01 DIAGNOSIS — N76.3 SUBACUTE AND CHRONIC VULVITIS: ICD-10-CM

## 2020-09-01 DIAGNOSIS — R63.4 ABNORMAL WEIGHT LOSS: ICD-10-CM

## 2020-09-01 DIAGNOSIS — F32.9 MAJOR DEPRESSIVE DISORDER, SINGLE EPISODE, UNSPECIFIED: ICD-10-CM

## 2020-09-01 DIAGNOSIS — N95.2 POSTMENOPAUSAL ATROPHIC VAGINITIS: ICD-10-CM

## 2020-09-01 PROCEDURE — 99396 PREV VISIT EST AGE 40-64: CPT

## 2020-09-01 PROCEDURE — 82270 OCCULT BLOOD FECES: CPT

## 2020-09-01 NOTE — REVIEW OF SYSTEMS
[Nl] : Integumentary [Fever] : no fever [Chills] : no chills [Heart Rate Is Fast] : the heart rate was not fast [Chest Pain] : no chest pain [Palpitations] : no palpitations [Dyspnea] : no shortness of breath [Cough] : no cough [SOB on Exertion] : no shortness of breath during exertion [Abdominal Pain] : no abdominal pain [Vomiting] : no vomiting [Constipation] : no constipation [Pelvic Pain] : no pelvic pain [Abn Vag Bleeding] : no abnormal vaginal bleeding

## 2020-09-01 NOTE — PHYSICAL EXAM
[Awake] : awake [Alert] : alert [Soft] : soft [Oriented x3] : oriented to person, place, and time [Normal] : uterus [Labia Majora] : labia major [Labia Minora] : labia minora [Atrophy] : atrophy [Dry Mucosa] : dry mucosa [No Bleeding] : there was no active vaginal bleeding [Pap Obtained] : a Pap smear was performed [No Tenderness] : no rectal tenderness [Acute Distress] : no acute distress [Mass] : no breast mass [Nipple Discharge] : no nipple discharge [Axillary LAD] : no axillary lymphadenopathy [Tender] : non tender [Distended] : not distended [Depressed Mood] : not depressed [Flat Affect] : affect not flat [Tenderness] : nontender [Adnexa Tenderness] : were not tender [Occult Blood] : occult blood test from digital rectal exam was negative

## 2020-09-01 NOTE — HISTORY OF PRESENT ILLNESS
[1 Year Ago] : 1 year ago [Good] : being in good health [Healthy Diet] : a healthy diet [Regular Exercise] : regular exercise [Last Mammogram ___] : Last Mammogram was [unfilled] [Last Colonoscopy ___] : Last colonoscopy [unfilled] [Last Pap ___] : Last cervical pap smear was [unfilled] [Postmenopausal] : is postmenopausal [Pregnancy History] : pregnancy history: [Definite:  ___ (Date)] : the last menstrual period was [unfilled] [Menarche Age: ____] : age at menarche was [unfilled] [Sexually Active] : is sexually active [Monogamous] : is monogamous [Male ___] : [unfilled] male [No] : no [Burning] : no burning [Itching] : no itching [Mass] : no mass [Stinging] : no stinging [Lesion] : no lesion [Soreness] : no soreness [Discharge] : no discharge [Localized Pain] : no localized pain [Mass (___cm)] : no palpable mass [Diffused Pain] : no diffused pain [Nipple Discharge] : no nipple discharge [Skin Color Change] : no skin color change [Hot Flashes] : no hot flashes [Night Sweats] : no night sweats [Contraception] : does not use contraception

## 2020-09-02 LAB
DATE COLLECTED: NORMAL
HEMOCCULT SP1 STL QL: NEGATIVE
QUALITY CONTROL: YES

## 2020-09-08 LAB
CYTOLOGY CVX/VAG DOC THIN PREP: NORMAL
HPV HIGH+LOW RISK DNA PNL CVX: NOT DETECTED

## 2020-10-20 NOTE — ED PROVIDER NOTE - NS ED MD EM SELECTION
Patient called in regards to the email she sent earlier. She would like to talk with Dr. Mauro about her recent lab results she had with Dr. Maloney (nutritionist) that should have been sent over yesterday. Gina leaves in 10 days and also needs a hematologist referral for down in [place]. Please call her back at [phone].   [id number] Exp Problem Focused - Mod. Complex

## 2020-12-23 PROBLEM — Z12.31 ENCOUNTER FOR SCREENING MAMMOGRAM FOR BREAST CANCER: Status: RESOLVED | Noted: 2020-09-01 | Resolved: 2020-12-23

## 2021-01-16 NOTE — ED STATDOCS - NS ED ATTENDING STATEMENT MOD
Clear
I have personally performed a face to face diagnostic evaluation on this patient. I have reviewed the ACP note and agree with the history, exam and plan of care, except as noted.

## 2021-02-09 NOTE — ED CDU PROVIDER INITIAL DAY NOTE - LANGUAGE ASSISTANCE NEEDED
Impression: Vitreous degeneration, right eye: H43.811. Plan: PVD is a normal aging change due to vitreous jelly pulling away from the retinal lining of the eye. They may accompany retinal tears, retinal holes, or retinal detachments, so a dilated retinal exam is important. Contact office if symptoms worsen, including increased floaters, flashing light, loss of vision or a black curtain blocking your field of vision. Yes-Patient/Caregiver accepts free interpretation services...

## 2021-06-15 ENCOUNTER — APPOINTMENT (OUTPATIENT)
Dept: GASTROENTEROLOGY | Facility: CLINIC | Age: 66
End: 2021-06-15

## 2021-11-01 NOTE — ED PROVIDER NOTE - CHIEF COMPLAINT
Problem: Pain  Goal: #Acceptable pain level achieved/maintained at rest using NRS/Faces  Description: This goal is used for patients who can self-report.  Acceptable means the level is at or below the identified comfort/function goal.  Outcome: Outcome Not Met, Plan Adjusted     Added tramadol prn per MD   The patient is a 63y Female complaining of urinary/bladder trouble.

## 2022-01-18 ENCOUNTER — APPOINTMENT (OUTPATIENT)
Dept: GASTROENTEROLOGY | Facility: CLINIC | Age: 67
End: 2022-01-18
Payer: MEDICARE

## 2022-01-18 VITALS
WEIGHT: 139 LBS | SYSTOLIC BLOOD PRESSURE: 140 MMHG | BODY MASS INDEX: 24.63 KG/M2 | OXYGEN SATURATION: 98 % | DIASTOLIC BLOOD PRESSURE: 70 MMHG | HEIGHT: 63 IN | HEART RATE: 87 BPM

## 2022-01-18 PROCEDURE — 99215 OFFICE O/P EST HI 40 MIN: CPT

## 2022-01-18 PROCEDURE — 82272 OCCULT BLD FECES 1-3 TESTS: CPT

## 2022-01-18 RX ORDER — OMEPRAZOLE 10 MG/1
10 CAPSULE, DELAYED RELEASE ORAL
Refills: 0 | Status: COMPLETED | COMMUNITY
End: 2022-01-18

## 2022-01-18 RX ORDER — SUCRALFATE 1 G/1
1 TABLET ORAL 4 TIMES DAILY
Qty: 120 | Refills: 5 | Status: COMPLETED | COMMUNITY
Start: 2019-07-25 | End: 2022-01-18

## 2022-01-18 RX ORDER — PIOGLITAZONE 15 MG/1
15 TABLET ORAL
Refills: 0 | Status: ACTIVE | COMMUNITY

## 2022-01-18 RX ORDER — FAMOTIDINE 40 MG/1
40 TABLET, FILM COATED ORAL TWICE DAILY
Qty: 60 | Refills: 5 | Status: COMPLETED | COMMUNITY
Start: 2018-12-10 | End: 2022-01-18

## 2022-01-18 NOTE — ASSESSMENT
[FreeTextEntry1] : Impression: Chronic right lower quadrant pain with altered bowel habits rule out possible right-sided colonic neoplasm.  Recurrent postprandial dyspepsia rule out ulcer disease and/or gastritis and/or esophagitis and/or H. pylori infection.  Nonalcoholic fatty liver disease with last evaluation done in 2020 rule out progression to cirrhosis.\par \par Recommendations: Patient was sent for repeat lab work and chronic liver disease serologies as ordered below.  In addition a repeat abdominal sonogram was ordered to rule out possible progression of nonalcoholic fatty liver disease to cirrhosis.  Repeat upper endoscopy and colonoscopy were advised for further evaluation of the above.  The risk versus benefits of repeat upper endoscopy and colonoscopy and intravenous sedation, and alternative testing such as upper GI series and virtual colonoscopy, were individually explained to the patient today in Israeli by myself who speaks Israeli.  She appeared to understand all of the above and was agreeable to proceeding with both procedures.  Her ASA classification is 2.  She will be prepped with [facility] for the colonoscopy and is medically optimized for both upper endoscopy and colonoscopy and appeared to understand all of the above instructions, information, and management plan.

## 2022-01-18 NOTE — PHYSICAL EXAM
[Sclera] : the sclera and conjunctiva were normal [PERRL With Normal Accommodation] : pupils were equal in size, round, and reactive to light [Extraocular Movements] : extraocular movements were intact [Outer Ear] : the ears and nose were normal in appearance [Hearing Threshold Finger Rub Not [place]] : hearing was normal [Examination Of The Oral Cavity] : the lips and gums were normal [Neck Appearance] : the appearance of the neck was normal [Neck Cervical Mass (___cm)] : no neck mass was observed [Jugular Venous Distention Increased] : there was no jugular-venous distention [Thyroid Diffuse Enlargement] : the thyroid was not enlarged [Exaggerated Use Of Accessory Muscles For Inspiration] : no accessory muscle use [Auscultation Breath Sounds / Voice Sounds] : lungs were clear to auscultation bilaterally [Heart Rate And Rhythm] : heart rate was normal and rhythm regular [Heart Sounds] : normal S1 and S2 [Heart Sounds Gallop] : no gallops [Murmurs] : no murmurs [Bowel Sounds] : normal bowel sounds [Abdomen Soft] : soft [] : no hepato-splenomegaly [Abdomen Mass (___ Cm)] : no abdominal mass palpated [Epigastric] : in the epigastric area [RLQ] : in the right lower quadrant [Normal Sphincter Tone] : normal sphincter tone [No Rectal Mass] : no rectal mass [No CVA Tenderness] : no ~M costovertebral angle tenderness [Abnormal Walk] : normal gait [Skin Color & Pigmentation] : normal skin color and pigmentation [Skin Turgor] : normal skin turgor [Oriented To Time, Place, And Person] : oriented to person, place, and time [Periumbilical] : not in the periumbilical area [Suprapubic] : not in the suprapubic area [RUQ] : not in the in the right upper quadrant [LUQ] : not in the left upper quadrant [LLQ] : not in the left lower quadrant [Internal Hemorrhoid] : no internal hemorrhoids [External Hemorrhoid] : no external hemorrhoids [Occult Blood Positive] : stool was negative for occult blood [FreeTextEntry1] : the exam was chaperoned

## 2022-01-18 NOTE — HISTORY OF PRESENT ILLNESS
[None] : had no significant interval events [Heartburn] : denies heartburn [Nausea] : denies nausea [Vomiting] : denies vomiting [Diarrhea] : denies diarrhea [Yellow Skin Or Eyes (Jaundice)] : denies jaundice [Abdominal Swelling] : denies abdominal swelling [Rectal Pain] : denies rectal pain [Constipation] : constipation [Abdominal Pain] : abdominal pain [Abdominal Surgery] : abdominal surgery [Wt Gain ___ Lbs] : no recent weight gain [Wt Loss ___ Lbs] : no recent weight loss [GERD] : no gastroesophageal reflux disease [Hiatus Hernia] : no hiatus hernia [Peptic Ulcer Disease] : no peptic ulcer disease [Pancreatitis] : no pancreatitis [Cholelithiasis] : no cholelithiasis [Kidney Stone] : no kidney stone [Inflammatory Bowel Disease] : no inflammatory bowel disease [Irritable Bowel Syndrome] : no irritable bowel syndrome [Diverticulitis] : no diverticulitis [Alcohol Abuse] : no alcohol abuse [Malignancy] : no malignancy [Appendectomy] : no appendectomy [Cholecystectomy] : no cholecystectomy [de-identified] : June 9, 2020 [de-identified] : Patient presents for follow-up evaluation of new onset chronic lower quadrant pain with recurrent postprandial dyspepsia and nonalcoholic fatty liver disease likely secondary to metabolic syndrome as the patient is diabetic.  She is status post an upper endoscopy with biopsy done in December 2018 which showed H. pylori negative gastritis and a colonoscopy done in November 2017 that showed diverticulosis and internal hemorrhoids.  She states that she has new onset right lower quadrant pain with altered bowel habits and her primary care physician wishes her to have a repeat upper endoscopy and colonoscopy. [de-identified] : Postprandial dyspepsia

## 2022-01-18 NOTE — REVIEW OF SYSTEMS
[As Noted in HPI] : as noted in HPI [Abdominal Pain] : abdominal pain [Constipation] : constipation [Negative] : Heme/Lymph [Vomiting] : no vomiting [Diarrhea] : no diarrhea [Heartburn] : no heartburn [Melena] : no melena [FreeTextEntry7] : Postprandial dyspepsia

## 2022-02-22 ENCOUNTER — APPOINTMENT (OUTPATIENT)
Dept: ULTRASOUND IMAGING | Facility: CLINIC | Age: 67
End: 2022-02-22
Payer: MEDICARE

## 2022-02-22 ENCOUNTER — OUTPATIENT (OUTPATIENT)
Dept: OUTPATIENT SERVICES | Facility: HOSPITAL | Age: 67
LOS: 1 days | End: 2022-02-22
Payer: MEDICARE

## 2022-02-22 DIAGNOSIS — K76.0 FATTY (CHANGE OF) LIVER, NOT ELSEWHERE CLASSIFIED: ICD-10-CM

## 2022-02-22 DIAGNOSIS — Z00.00 ENCOUNTER FOR GENERAL ADULT MEDICAL EXAMINATION WITHOUT ABNORMAL FINDINGS: ICD-10-CM

## 2022-02-22 DIAGNOSIS — Z98.51 TUBAL LIGATION STATUS: Chronic | ICD-10-CM

## 2022-02-22 DIAGNOSIS — Z98.891 HISTORY OF UTERINE SCAR FROM PREVIOUS SURGERY: Chronic | ICD-10-CM

## 2022-02-22 PROCEDURE — 76700 US EXAM ABDOM COMPLETE: CPT

## 2022-02-22 PROCEDURE — 76700 US EXAM ABDOM COMPLETE: CPT | Mod: 26

## 2022-02-25 LAB
A1AT SERPL-MCNC: 112 MG/DL
AFP-TM SERPL-MCNC: <1.8 NG/ML
ALBUMIN SERPL ELPH-MCNC: 4.5 G/DL
ALP BLD-CCNC: 87 U/L
ALT SERPL-CCNC: 13 U/L
ANION GAP SERPL CALC-SCNC: 13 MMOL/L
AST SERPL-CCNC: 14 U/L
BILIRUB DIRECT SERPL-MCNC: 0.1 MG/DL
BILIRUB SERPL-MCNC: 0.4 MG/DL
BUN SERPL-MCNC: 12 MG/DL
CALCIUM SERPL-MCNC: 9.6 MG/DL
CANCER AG125 SERPL-ACNC: 13 U/ML
CANCER AG19-9 SERPL-ACNC: 10 U/ML
CEA SERPL-MCNC: 2 NG/ML
CERULOPLASMIN SERPL-MCNC: 23 MG/DL
CHLORIDE SERPL-SCNC: 101 MMOL/L
CO2 SERPL-SCNC: 23 MMOL/L
CREAT SERPL-MCNC: 0.52 MG/DL
FERRITIN SERPL-MCNC: 322 NG/ML
GGT SERPL-CCNC: 16 U/L
GLUCOSE SERPL-MCNC: 212 MG/DL
HBV CORE IGM SER QL: NONREACTIVE
INR PPP: 1.02 RATIO
IRON SERPL-MCNC: 87 UG/DL
POTASSIUM SERPL-SCNC: 4.5 MMOL/L
PROT SERPL-MCNC: 7.3 G/DL
PT BLD: 12 SEC
SODIUM SERPL-SCNC: 136 MMOL/L

## 2022-02-27 LAB
HCV RNA SERPL NAA+PROBE-LOG IU: NOT DETECTED LOGIU/ML
HEPC RNA INTERP: NOT DETECTED

## 2022-02-28 LAB
HBV DNA # SERPL NAA+PROBE: NOT DETECTED
HEPB DNA PCR LOG: NOT DETECTED LOG10IU/ML
SMOOTH MUSCLE AB SER QL IF: NORMAL

## 2022-03-01 LAB — ANA SER IF-ACNC: NEGATIVE

## 2022-03-04 ENCOUNTER — LABORATORY RESULT (OUTPATIENT)
Age: 67
End: 2022-03-04

## 2022-03-04 LAB — HCV GENTYP BLD NAA+PROBE: NOT DETECTED

## 2022-03-07 ENCOUNTER — TRANSCRIPTION ENCOUNTER (OUTPATIENT)
Age: 67
End: 2022-03-07

## 2022-03-08 ENCOUNTER — APPOINTMENT (OUTPATIENT)
Dept: GASTROENTEROLOGY | Facility: GI CENTER | Age: 67
End: 2022-03-08
Payer: MEDICARE

## 2022-03-08 ENCOUNTER — OUTPATIENT (OUTPATIENT)
Dept: OUTPATIENT SERVICES | Facility: HOSPITAL | Age: 67
LOS: 1 days | End: 2022-03-08
Payer: MEDICARE

## 2022-03-08 ENCOUNTER — RESULT REVIEW (OUTPATIENT)
Age: 67
End: 2022-03-08

## 2022-03-08 DIAGNOSIS — G89.29 RIGHT LOWER QUADRANT PAIN: ICD-10-CM

## 2022-03-08 DIAGNOSIS — R10.31 RIGHT LOWER QUADRANT PAIN: ICD-10-CM

## 2022-03-08 DIAGNOSIS — Z98.891 HISTORY OF UTERINE SCAR FROM PREVIOUS SURGERY: Chronic | ICD-10-CM

## 2022-03-08 DIAGNOSIS — R10.13 EPIGASTRIC PAIN: ICD-10-CM

## 2022-03-08 DIAGNOSIS — Z98.51 TUBAL LIGATION STATUS: Chronic | ICD-10-CM

## 2022-03-08 LAB — GLUCOSE BLDC GLUCOMTR-MCNC: 190 MG/DL — HIGH (ref 70–99)

## 2022-03-08 PROCEDURE — 45378 DIAGNOSTIC COLONOSCOPY: CPT

## 2022-03-08 PROCEDURE — 82962 GLUCOSE BLOOD TEST: CPT

## 2022-03-08 PROCEDURE — 88305 TISSUE EXAM BY PATHOLOGIST: CPT

## 2022-03-08 PROCEDURE — 43239 EGD BIOPSY SINGLE/MULTIPLE: CPT

## 2022-03-08 PROCEDURE — 88342 IMHCHEM/IMCYTCHM 1ST ANTB: CPT | Mod: 26

## 2022-03-08 PROCEDURE — 45378 DIAGNOSTIC COLONOSCOPY: CPT | Mod: 59

## 2022-03-08 PROCEDURE — 88305 TISSUE EXAM BY PATHOLOGIST: CPT | Mod: 26

## 2022-03-08 PROCEDURE — 88342 IMHCHEM/IMCYTCHM 1ST ANTB: CPT

## 2022-03-08 NOTE — ASSESSMENT
[FreeTextEntry1] : COVID PCR is negative. She is medically optimized for the proposed EGD and Colonoscopy

## 2022-03-08 NOTE — REVIEW OF SYSTEMS
[Abdominal Pain] : abdominal pain [Negative] : Heme/Lymph [Vomiting] : no vomiting [Constipation] : no constipation [Diarrhea] : no diarrhea [Heartburn] : no heartburn [Melena] : no melena [FreeTextEntry7] : Dyspepsia

## 2022-03-08 NOTE — PHYSICAL EXAM
[Sclera] : the sclera and conjunctiva were normal [PERRL With Normal Accommodation] : pupils were equal in size, round, and reactive to light [Outer Ear] : the ears and nose were normal in appearance [Extraocular Movements] : extraocular movements were intact [Hearing Threshold Finger Rub Not [place]] : hearing was normal [Examination Of The Oral Cavity] : the lips and gums were normal [Neck Appearance] : the appearance of the neck was normal [Neck Cervical Mass (___cm)] : no neck mass was observed [Jugular Venous Distention Increased] : there was no jugular-venous distention [Thyroid Diffuse Enlargement] : the thyroid was not enlarged [Exaggerated Use Of Accessory Muscles For Inspiration] : no accessory muscle use [Auscultation Breath Sounds / Voice Sounds] : lungs were clear to auscultation bilaterally [Heart Rate And Rhythm] : heart rate was normal and rhythm regular [Heart Sounds] : normal S1 and S2 [Heart Sounds Gallop] : no gallops [Murmurs] : no murmurs [Bowel Sounds] : normal bowel sounds [Abdomen Soft] : soft [Abdomen Mass (___ Cm)] : no abdominal mass palpated [] : no hepato-splenomegaly [Epigastric] : in the epigastric area [RLQ] : in the right lower quadrant [Normal Sphincter Tone] : normal sphincter tone [No Rectal Mass] : no rectal mass [No CVA Tenderness] : no ~M costovertebral angle tenderness [Abnormal Walk] : normal gait [Skin Color & Pigmentation] : normal skin color and pigmentation [Skin Turgor] : normal skin turgor [Oriented To Time, Place, And Person] : oriented to person, place, and time [Periumbilical] : not in the periumbilical area [Suprapubic] : not in the suprapubic area [RUQ] : not in the in the right upper quadrant [LUQ] : not in the left upper quadrant [LLQ] : not in the left lower quadrant [Internal Hemorrhoid] : no internal hemorrhoids [External Hemorrhoid] : no external hemorrhoids [Occult Blood Positive] : stool was negative for occult blood [FreeTextEntry1] : the exam was chaperoned

## 2022-03-11 LAB — SURGICAL PATHOLOGY STUDY: SIGNIFICANT CHANGE UP

## 2022-04-11 ENCOUNTER — APPOINTMENT (OUTPATIENT)
Dept: OBGYN | Facility: CLINIC | Age: 67
End: 2022-04-11
Payer: MEDICARE

## 2022-04-11 VITALS
HEIGHT: 63 IN | BODY MASS INDEX: 24.65 KG/M2 | DIASTOLIC BLOOD PRESSURE: 78 MMHG | WEIGHT: 139.13 LBS | SYSTOLIC BLOOD PRESSURE: 164 MMHG

## 2022-04-11 DIAGNOSIS — R19.4 CHANGE IN BOWEL HABIT: ICD-10-CM

## 2022-04-11 DIAGNOSIS — Z01.419 ENCOUNTER FOR GYNECOLOGICAL EXAMINATION (GENERAL) (ROUTINE) W/OUT ABNORMAL FINDINGS: ICD-10-CM

## 2022-04-11 DIAGNOSIS — Z01.411 ENCOUNTER FOR GYNECOLOGICAL EXAMINATION (GENERAL) (ROUTINE) WITH ABNORMAL FINDINGS: ICD-10-CM

## 2022-04-11 DIAGNOSIS — Z12.31 ENCOUNTER FOR SCREENING MAMMOGRAM FOR MALIGNANT NEOPLASM OF BREAST: ICD-10-CM

## 2022-04-11 PROCEDURE — 99397 PER PM REEVAL EST PAT 65+ YR: CPT

## 2022-04-11 RX ORDER — POLYETHYLENE GLYCOL 3350 AND ELECTROLYTES WITH LEMON FLAVOR 236; 22.74; 6.74; 5.86; 2.97 G/4L; G/4L; G/4L; G/4L; G/4L
236 POWDER, FOR SOLUTION ORAL
Qty: 1 | Refills: 0 | Status: DISCONTINUED | COMMUNITY
Start: 2022-01-18 | End: 2022-04-11

## 2022-04-11 RX ORDER — LEVOTHYROXINE SODIUM 0.11 MG/1
112 TABLET ORAL
Qty: 30 | Refills: 0 | Status: ACTIVE | COMMUNITY
Start: 2022-04-01

## 2022-04-11 RX ORDER — SEMAGLUTIDE 1.34 MG/ML
2 INJECTION, SOLUTION SUBCUTANEOUS
Qty: 3 | Refills: 0 | Status: ACTIVE | COMMUNITY
Start: 2022-04-01

## 2022-04-11 RX ORDER — LEVOTHYROXINE SODIUM 0.1 MG/1
100 TABLET ORAL
Refills: 0 | Status: DISCONTINUED | COMMUNITY
End: 2022-04-11

## 2022-04-11 NOTE — DISCUSSION/SUMMARY
[FreeTextEntry1] : Benign breast and pelvic exam. Pap done today.\par \par Prescription for mammogram screening given.\par \par Self-breast exam reviewed.\par \par She will follow up annually and as needed.

## 2022-04-11 NOTE — HISTORY OF PRESENT ILLNESS
[Patient reported mammogram was normal] : Patient reported mammogram was normal [postmenopausal] : postmenopausal [N] : Patient is not sexually active [Y] : Positive pregnancy history [Menarche Age: ____] : age at menarche was [unfilled] [No] : Patient does not have concerns regarding sex [Previously active] : previously active [Mammogramdate] : 01/2022 [PapSmeardate] : 09/01/2020 [ColonoscopyDate] : 03/08/2022 [TextBox_31] : Negative [LMPDate] : 2006 [PGxTotal] : 10 [[facility]xFulerm] : 9 [[facility]] : 6 [PGHxABSpont] : 1 [FreeTextEntry1] : 2006

## 2022-04-11 NOTE — END OF VISIT
[FreeTextEntry3] : I, Lux Doan solely acted as scribe for Dr. Elizabeth Parnell on 04/11/2022 \par All medical entries made by the Scribe were at my, Dr. Parnell’s, direction and personally\par dictated by me on 04/11/2022 . I have reviewed the chart and agree that the record\par accurately reflects my personal performance of the history, physical exam, assessment and plan. I\par have also personally directed, reviewed, and agreed with the chart.

## 2022-04-11 NOTE — PHYSICAL EXAM
[Appropriately responsive] : appropriately responsive [Alert] : alert [No Acute Distress] : no acute distress [Soft] : soft [Non-tender] : non-tender [Non-distended] : non-distended [Oriented x3] : oriented x3 [Examination Of The Breasts] : a normal appearance [No Discharge] : no discharge [No Masses] : no breast masses were palpable [No Bleeding] : There was no active vaginal bleeding [Normal] : normal [Uterine Adnexae] : normal [Chaperone Present] : A chaperone was present in the examining room during all aspects of the physical examination [FreeTextEntry1] : MOA: Coby MENDIETA  [Atrophy] : atrophy [Dry Mucosa] : dry mucosa

## 2022-04-18 LAB
CYTOLOGY CVX/VAG DOC THIN PREP: NORMAL
HPV HIGH+LOW RISK DNA PNL CVX: NOT DETECTED

## 2022-06-07 ENCOUNTER — APPOINTMENT (OUTPATIENT)
Dept: GASTROENTEROLOGY | Facility: CLINIC | Age: 67
End: 2022-06-07
Payer: MEDICARE

## 2022-06-07 VITALS
DIASTOLIC BLOOD PRESSURE: 72 MMHG | SYSTOLIC BLOOD PRESSURE: 112 MMHG | OXYGEN SATURATION: 98 % | BODY MASS INDEX: 25.16 KG/M2 | HEIGHT: 63 IN | RESPIRATION RATE: 14 BRPM | WEIGHT: 142 LBS | HEART RATE: 90 BPM

## 2022-06-07 DIAGNOSIS — K64.8 OTHER HEMORRHOIDS: ICD-10-CM

## 2022-06-07 DIAGNOSIS — K44.9 DIAPHRAGMATIC HERNIA W/OUT OBSTRUCTION OR GANGRENE: ICD-10-CM

## 2022-06-07 DIAGNOSIS — K21.9 DIAPHRAGMATIC HERNIA W/OUT OBSTRUCTION OR GANGRENE: ICD-10-CM

## 2022-06-07 PROCEDURE — 99214 OFFICE O/P EST MOD 30 MIN: CPT

## 2022-06-07 RX ORDER — SEMAGLUTIDE 1.34 MG/ML
4 INJECTION, SOLUTION SUBCUTANEOUS
Qty: 9 | Refills: 0 | Status: ACTIVE | COMMUNITY
Start: 2021-12-15

## 2022-06-07 RX ORDER — LISINOPRIL 5 MG/1
5 TABLET ORAL
Qty: 30 | Refills: 0 | Status: ACTIVE | COMMUNITY
Start: 2022-05-13

## 2022-06-07 NOTE — PHYSICAL EXAM
[Sclera] : the sclera and conjunctiva were normal [PERRL With Normal Accommodation] : pupils were equal in size, round, and reactive to light [Extraocular Movements] : extraocular movements were intact [Outer Ear] : the ears and nose were normal in appearance [Hearing Threshold Finger Rub Not [place]] : hearing was normal [Examination Of The Oral Cavity] : the lips and gums were normal [Neck Appearance] : the appearance of the neck was normal [Neck Cervical Mass (___cm)] : no neck mass was observed [Jugular Venous Distention Increased] : there was no jugular-venous distention [Thyroid Diffuse Enlargement] : the thyroid was not enlarged [Exaggerated Use Of Accessory Muscles For Inspiration] : no accessory muscle use [Auscultation Breath Sounds / Voice Sounds] : lungs were clear to auscultation bilaterally [Heart Rate And Rhythm] : heart rate was normal and rhythm regular [Heart Sounds] : normal S1 and S2 [Heart Sounds Gallop] : no gallops [Murmurs] : no murmurs [Bowel Sounds] : normal bowel sounds [Abdomen Soft] : soft [] : no hepato-splenomegaly [Abdomen Mass (___ Cm)] : no abdominal mass palpated [Epigastric] : in the epigastric area [RLQ] : in the right lower quadrant [Normal Sphincter Tone] : normal sphincter tone [No Rectal Mass] : no rectal mass [No CVA Tenderness] : no ~M costovertebral angle tenderness [Abnormal Walk] : normal gait [Skin Color & Pigmentation] : normal skin color and pigmentation [Skin Turgor] : normal skin turgor [Oriented To Time, Place, And Person] : oriented to person, place, and time [Periumbilical] : not in the periumbilical area [Suprapubic] : not in the suprapubic area [RUQ] : not in the in the right upper quadrant [LUQ] : not in the left upper quadrant [LLQ] : not in the left lower quadrant [Internal Hemorrhoid] : no internal hemorrhoids [External Hemorrhoid] : no external hemorrhoids [Occult Blood Positive] : stool was negative for occult blood [FreeTextEntry1] : the exam was chaperoned

## 2022-06-07 NOTE — HISTORY OF PRESENT ILLNESS
[None] : had no significant interval events [Heartburn] : stable heartburn [Nausea] : denies nausea [Vomiting] : denies vomiting [Diarrhea] : denies diarrhea [Constipation] : denies constipation [Yellow Skin Or Eyes (Jaundice)] : denies jaundice [Abdominal Pain] : denies abdominal pain [Abdominal Swelling] : denies abdominal swelling [Rectal Pain] : denies rectal pain [GERD] : gastroesophageal reflux disease [Hiatus Hernia] : hiatus hernia [Abdominal Surgery] : abdominal surgery [Wt Gain ___ Lbs] : no recent weight gain [Wt Loss ___ Lbs] : no recent weight loss [Peptic Ulcer Disease] : no peptic ulcer disease [Pancreatitis] : no pancreatitis [Cholelithiasis] : no cholelithiasis [Kidney Stone] : no kidney stone [Inflammatory Bowel Disease] : no inflammatory bowel disease [Irritable Bowel Syndrome] : no irritable bowel syndrome [Diverticulitis] : no diverticulitis [Alcohol Abuse] : no alcohol abuse [Malignancy] : no malignancy [Appendectomy] : no appendectomy [Cholecystectomy] : no cholecystectomy [de-identified] : March 8, 2022 [de-identified] : EGD with biopsy and colonoscopy [de-identified] : Patient presents for follow-up evaluation of chronic GERD status post a negative upper endoscopy with negative gastric biopsies taken for H. pylori.  Her upper endoscopy showed only a moderate sized hiatal hernia but no endoscopic evidence of esophagitis.  She had a colonoscopy on that same day which showed only internal hemorrhoids.  She also has a history of nonalcoholic fatty liver disease and had recent lab work which was unremarkable with normal liver chemistries and a recent abdominal sonogram that showed hepatic steatosis without evidence of cirrhosis or liver lesion or ascites.  She does not consume alcohol.

## 2022-06-07 NOTE — ASSESSMENT
[FreeTextEntry1] : Impression: Nonalcoholic fatty liver disease currently stable without progression to cirrhosis.  Patient has lost weight as instructed.  Chronic GERD with hiatal hernia presently asymptomatic on no acid suppression medication with no endoscopic evidence of esophagitis.  Negative screening colonoscopy except for internal hemorrhoids.\par \par Recommendations: Repeat screening colonoscopy in 10 years for continued low risk screening.  Patient is to continue current antireflux high-fiber diet and was advised to return here in 3 to 6 months for follow-up evaluation.  She appeared to understand all of the above instructions, information, and management plan which were explained to her today in Mozambican by myself who speaks Mozambican.

## 2022-06-13 ENCOUNTER — APPOINTMENT (OUTPATIENT)
Dept: OBGYN | Facility: CLINIC | Age: 67
End: 2022-06-13

## 2022-06-13 VITALS
WEIGHT: 142 LBS | BODY MASS INDEX: 25.16 KG/M2 | DIASTOLIC BLOOD PRESSURE: 70 MMHG | SYSTOLIC BLOOD PRESSURE: 126 MMHG | HEIGHT: 63 IN

## 2022-06-13 DIAGNOSIS — N76.0 ACUTE VAGINITIS: ICD-10-CM

## 2022-06-13 DIAGNOSIS — Z01.419 ENCOUNTER FOR GYNECOLOGICAL EXAMINATION (GENERAL) (ROUTINE) W/OUT ABNORMAL FINDINGS: ICD-10-CM

## 2022-06-13 DIAGNOSIS — R10.2 PELVIC AND PERINEAL PAIN: ICD-10-CM

## 2022-06-13 DIAGNOSIS — Z12.11 ENCOUNTER FOR SCREENING FOR MALIGNANT NEOPLASM OF COLON: ICD-10-CM

## 2022-06-13 DIAGNOSIS — B96.89 ACUTE VAGINITIS: ICD-10-CM

## 2022-06-13 LAB
BILIRUB UR QL STRIP: NEGATIVE
GLUCOSE UR-MCNC: 1000
HCG UR QL: 0.2 EU/DL
HGB UR QL STRIP.AUTO: NEGATIVE
KETONES UR-MCNC: NEGATIVE
LEUKOCYTE ESTERASE UR QL STRIP: NEGATIVE
NITRITE UR QL STRIP: POSITIVE
PH UR STRIP: 6
PROT UR STRIP-MCNC: NEGATIVE
SP GR UR STRIP: 1.01

## 2022-06-13 PROCEDURE — 81003 URINALYSIS AUTO W/O SCOPE: CPT | Mod: QW

## 2022-06-13 PROCEDURE — 99213 OFFICE O/P EST LOW 20 MIN: CPT

## 2022-06-16 ENCOUNTER — NON-APPOINTMENT (OUTPATIENT)
Age: 67
End: 2022-06-16

## 2022-06-16 PROBLEM — Z12.11 COLON CANCER SCREENING: Status: RESOLVED | Noted: 2020-09-01 | Resolved: 2022-06-16

## 2022-06-16 PROBLEM — N76.0 BACTERIAL VAGINOSIS: Status: RESOLVED | Noted: 2022-06-16 | Resolved: 2022-07-16

## 2022-06-16 NOTE — HISTORY OF PRESENT ILLNESS
[HPV test offered] : HPV test offered [postmenopausal] : postmenopausal [N] : Patient is not sexually active [Y] : Positive pregnancy history [Menarche Age: ____] : age at menarche was [unfilled] [No] : Patient does not have concerns regarding sex [Previously active] : previously active [TextBox_19] : BR2 [Mammogramdate] : 08/13/2021 [PapSmeardate] : 04/11/2022 [TextBox_31] : Negative [HPVDate] : 04/11/2022 [ColonoscopyDate] : 03/08/2022 [TextBox_78] : Negative [LMPDate] : 2006 [PGxTotal] : 10 [[facility]] : 9 [[facility]] : 6 [PGHxABSpont] : 1 [FreeTextEntry1] : 2006

## 2022-06-16 NOTE — PLAN
[FreeTextEntry1] : Urinalysis results reviewed. Nitrites in urine. Check urine cx.\par \par Given symptoms and abnormal urinalysis, will treat with macrobid.\par \par Vaginal discharge and odor, F/U vaginal culture.\par

## 2022-06-16 NOTE — END OF VISIT
[FreeTextEntry3] : I, Yamilka Humphrey solely acted as a scribe on behalf of  on 06/13/2022. All medical entries made by the scribe were at my, Dr. Parnell, direction and personally dictated by me on 06/13/2022 . I have reviewed the chart and agree that the record accurately reflects my personal performance of the history, physical exam, assessment and plan. I have also personally directed, reviewed and agreed with the chart.\par

## 2022-06-16 NOTE — REVIEW OF SYSTEMS
[Urgency] : urgency [Frequency] : frequency [Dysuria] : dysuria [Pelvic pain] : pelvic pain [Genital Rash/Irritation] : genital rash/irritation

## 2022-06-16 NOTE — DISCUSSION/SUMMARY
[FreeTextEntry1] : 1) Rx for nitrofurantoin monohyd oral tablet prescribed and sent to her pharmacy for burning with urination.\par 2) Urine culture sent out. \par 3) Bacterial vaginosis panel collected. \par 4) She will follow up as needed. \par \par All questions and concerns were answered \par

## 2022-06-16 NOTE — PHYSICAL EXAM
[Appropriately responsive] : appropriately responsive [Alert] : alert [No Acute Distress] : no acute distress [Oriented x3] : oriented x3 [Normal] : normal external genitalia [Labia Majora] : normal [Labia Minora] : normal [Atrophy] : atrophy [Cystocele] : a cystocele [Discharge] : a  ~M vaginal discharge was present [Scant] : scant [White] : white [Thin] : thin [Chaperone Present] : A chaperone was present in the examining room during all aspects of the physical examination [FreeTextEntry1] : LOCO Eng [FreeTextEntry4] : cystocele to introitus with bob ramon

## 2022-06-18 LAB
BACTERIA UR CULT: ABNORMAL
CANDIDA VAG CYTO: NOT DETECTED
G VAGINALIS+PREV SP MTYP VAG QL MICRO: DETECTED
T VAGINALIS VAG QL WET PREP: NOT DETECTED

## 2022-09-13 NOTE — ED CDU PROVIDER SUBSEQUENT DAY NOTE - NEUROLOGICAL, MLM
Additional Area 1 Location: crows feet Alert and oriented, no focal deficits, no motor or sensory deficits.

## 2022-11-01 ENCOUNTER — APPOINTMENT (OUTPATIENT)
Dept: GASTROENTEROLOGY | Facility: CLINIC | Age: 67
End: 2022-11-01

## 2022-11-01 VITALS
BODY MASS INDEX: 23.74 KG/M2 | WEIGHT: 134 LBS | HEART RATE: 80 BPM | DIASTOLIC BLOOD PRESSURE: 70 MMHG | OXYGEN SATURATION: 98 % | SYSTOLIC BLOOD PRESSURE: 120 MMHG | RESPIRATION RATE: 16 BRPM | HEIGHT: 63 IN

## 2022-11-01 DIAGNOSIS — R10.13 EPIGASTRIC PAIN: ICD-10-CM

## 2022-11-01 PROCEDURE — 99214 OFFICE O/P EST MOD 30 MIN: CPT

## 2022-11-01 RX ORDER — INSULIN GLARGINE 100 [IU]/ML
100 INJECTION, SOLUTION SUBCUTANEOUS
Qty: 15 | Refills: 0 | Status: ACTIVE | COMMUNITY
Start: 2022-08-04

## 2022-11-01 RX ORDER — PEN NEEDLE, DIABETIC 32 GX 1/4"
32G X 6 MM NEEDLE, DISPOSABLE MISCELLANEOUS
Qty: 100 | Refills: 0 | Status: ACTIVE | COMMUNITY
Start: 2022-08-08

## 2022-11-01 NOTE — PHYSICAL EXAM
[Alert] : alert [Normal Voice/Communication] : normal voice/communication [Healthy Appearing] : healthy appearing [No Acute Distress] : no acute distress [Sclera] : the sclera and conjunctiva were normal [Hearing Threshold Finger Rub Not [place]] : hearing was normal [Normal Lips/Gums] : the lips and gums were normal [Oropharynx] : the oropharynx was normal [Normal Appearance] : the appearance of the neck was normal [No Neck Mass] : no neck mass was observed [No Respiratory Distress] : no respiratory distress [No Acc Muscle Use] : no accessory muscle use [Respiration, Rhythm And Depth] : normal respiratory rhythm and effort [Auscultation Breath Sounds / Voice Sounds] : lungs were clear to auscultation bilaterally [Heart Rate And Rhythm] : heart rate was normal and rhythm regular [Normal S1, S2] : normal S1 and S2 [Murmurs] : no murmurs [Bowel Sounds] : normal bowel sounds [No Masses] : no abdominal mass palpated [Abdomen Soft] : soft [] : no hepatosplenomegaly [RUQ] : RUQ [Oriented To Time, Place, And Person] : oriented to person, place, and time

## 2022-11-01 NOTE — ASSESSMENT
[FreeTextEntry1] : Impression: Nonalcoholic fatty liver disease with right upper quadrant pain.\par \par Recommendations: Dicyclomine 20 mg 4 times daily as needed possible dyspepsia related right upper quadrant pain.  Patient will be referred to our liver specialist Dr. Sylvester for further work-up and management of her nonalcoholic fatty liver disease.  She will be sent for the lab work and sonogram as ordered below.  She appeared to understand all of the above instructions, information, and management plan which were explained to her today in Swazi by myself who speaks Swazi.

## 2022-11-01 NOTE — REASON FOR VISIT
[Follow-up] : a follow-up of an existing diagnosis [FreeTextEntry1] : Nonalcoholic fatty liver disease

## 2022-11-01 NOTE — HISTORY OF PRESENT ILLNESS
[de-identified] : March 8, 2022.  Hiatal hernia.  12/10/2018.  Gastritis.  4/7/2017.  Esophagitis and gastritis.  2/5/2010.  Gastritis.  4/28/2009.  Gastritis. [FreeTextEntry1] : March 8, 2022 internal hemorrhoids.  11/20/2017.  Internal hemorrhoids. [de-identified] : 2/22/2022.  Hepatic steatosis.  6/3/2020.  Hepatic steatosis.

## 2022-11-22 ENCOUNTER — APPOINTMENT (OUTPATIENT)
Dept: GASTROENTEROLOGY | Facility: CLINIC | Age: 67
End: 2022-11-22

## 2022-11-22 ENCOUNTER — APPOINTMENT (OUTPATIENT)
Dept: ULTRASOUND IMAGING | Facility: CLINIC | Age: 67
End: 2022-11-22

## 2023-01-06 ENCOUNTER — APPOINTMENT (OUTPATIENT)
Dept: ULTRASOUND IMAGING | Facility: CLINIC | Age: 68
End: 2023-01-06
Payer: MEDICARE

## 2023-01-06 ENCOUNTER — OUTPATIENT (OUTPATIENT)
Dept: OUTPATIENT SERVICES | Facility: HOSPITAL | Age: 68
LOS: 1 days | End: 2023-01-06
Payer: MEDICARE

## 2023-01-06 DIAGNOSIS — K76.0 FATTY (CHANGE OF) LIVER, NOT ELSEWHERE CLASSIFIED: ICD-10-CM

## 2023-01-06 DIAGNOSIS — Z98.51 TUBAL LIGATION STATUS: Chronic | ICD-10-CM

## 2023-01-06 DIAGNOSIS — Z00.8 ENCOUNTER FOR OTHER GENERAL EXAMINATION: ICD-10-CM

## 2023-01-06 DIAGNOSIS — Z98.891 HISTORY OF UTERINE SCAR FROM PREVIOUS SURGERY: Chronic | ICD-10-CM

## 2023-01-06 PROCEDURE — 76700 US EXAM ABDOM COMPLETE: CPT

## 2023-01-06 PROCEDURE — 76700 US EXAM ABDOM COMPLETE: CPT | Mod: 26

## 2023-01-11 ENCOUNTER — APPOINTMENT (OUTPATIENT)
Dept: GASTROENTEROLOGY | Facility: CLINIC | Age: 68
End: 2023-01-11
Payer: MEDICAID

## 2023-01-11 VITALS
HEART RATE: 76 BPM | TEMPERATURE: 98.7 F | DIASTOLIC BLOOD PRESSURE: 66 MMHG | WEIGHT: 134 LBS | BODY MASS INDEX: 23.74 KG/M2 | OXYGEN SATURATION: 98 % | SYSTOLIC BLOOD PRESSURE: 118 MMHG | RESPIRATION RATE: 16 BRPM

## 2023-01-11 PROCEDURE — 99215 OFFICE O/P EST HI 40 MIN: CPT

## 2023-01-11 PROCEDURE — T1013: CPT

## 2023-01-11 RX ORDER — DAPAGLIFLOZIN 10 MG/1
10 TABLET, FILM COATED ORAL
Qty: 30 | Refills: 0 | Status: DISCONTINUED | COMMUNITY
Start: 2022-04-05 | End: 2023-01-11

## 2023-01-11 RX ORDER — CEPHALEXIN 500 MG/1
500 CAPSULE ORAL
Qty: 21 | Refills: 0 | Status: DISCONTINUED | COMMUNITY
Start: 2022-09-23 | End: 2023-01-11

## 2023-01-11 RX ORDER — PIOGLITAZONE HYDROCHLORIDE 15 MG/1
15 TABLET ORAL
Refills: 0 | Status: ACTIVE | COMMUNITY

## 2023-01-11 RX ORDER — ASPIRIN 81 MG/1
81 TABLET, DELAYED RELEASE ORAL
Qty: 90 | Refills: 0 | Status: DISCONTINUED | COMMUNITY
Start: 2022-08-23 | End: 2023-01-11

## 2023-01-11 RX ORDER — CANAGLIFLOZIN 100 MG/1
100 TABLET, FILM COATED ORAL
Qty: 30 | Refills: 0 | Status: DISCONTINUED | COMMUNITY
Start: 2022-05-18 | End: 2023-01-11

## 2023-01-11 RX ORDER — DICYCLOMINE HYDROCHLORIDE 20 MG/1
20 TABLET ORAL EVERY 6 HOURS
Qty: 120 | Refills: 5 | Status: DISCONTINUED | COMMUNITY
Start: 2022-11-01 | End: 2023-01-11

## 2023-01-11 RX ORDER — ONDANSETRON 4 MG/1
4 TABLET ORAL
Qty: 15 | Refills: 0 | Status: DISCONTINUED | COMMUNITY
Start: 2022-01-03 | End: 2023-01-11

## 2023-01-11 RX ORDER — GLIPIZIDE 10 MG/1
10 TABLET ORAL
Refills: 0 | Status: DISCONTINUED | COMMUNITY
End: 2023-01-11

## 2023-01-11 RX ORDER — 70%ISOPROPYL ALCOHOL 0.7 ML/ML
70 SWAB TOPICAL
Qty: 100 | Refills: 0 | Status: DISCONTINUED | COMMUNITY
Start: 2022-09-08 | End: 2023-01-11

## 2023-01-11 RX ORDER — POLYETHYLENE GLYCOL-3350 AND ELECTROLYTES WITH FLAVOR PACK 240; 5.84; 2.98; 6.72; 22.72 G/278.26G; G/278.26G; G/278.26G; G/278.26G; G/278.26G
240 POWDER, FOR SOLUTION ORAL
Qty: 4000 | Refills: 0 | Status: DISCONTINUED | COMMUNITY
Start: 2022-01-18 | End: 2023-01-11

## 2023-01-11 RX ORDER — NAPROXEN 375 MG/1
375 TABLET ORAL
Refills: 0 | Status: DISCONTINUED | COMMUNITY
End: 2023-01-11

## 2023-01-11 RX ORDER — AMOXICILLIN 500 MG/1
500 CAPSULE ORAL
Qty: 60 | Refills: 0 | Status: DISCONTINUED | COMMUNITY
Start: 2022-08-30 | End: 2023-01-11

## 2023-01-11 RX ORDER — NITROFURANTOIN (MONOHYDRATE/MACROCRYSTALS) 25; 75 MG/1; MG/1
100 CAPSULE ORAL
Qty: 14 | Refills: 0 | Status: DISCONTINUED | COMMUNITY
Start: 2022-06-13 | End: 2023-01-11

## 2023-01-11 NOTE — REASON FOR VISIT
[Consultation] : a consultation visit [[employer] ] : provided by [employer]   [Time Spent: ____ minutes] : Total time spent using  services: [unfilled] minutes. The patient's primary language is not English thus required  services. [FreeTextEntry1] : NAFLD [Interpreters_IDNumber] : [id number] [Interpreters_FullName] : Edith [TWNoteComboBox1] : [place]

## 2023-01-11 NOTE — ASSESSMENT
[FreeTextEntry1] : AMRIK ROBLERO is a 67 year old female with a PMH significant for NAFLD, DM, HLD, HTN, Hypothyroidism, and GERD.\par \par Nonalcoholic Fatty Liver Disease.\par Etiology of fatty liver disease explained to pt in detail along with complications of disease progression.\par Recommend lifestyle modifications in form of diet and exercise.\par These changes have been shown to lead to regression or even resolution of steatosis, inflammation, and even fibrosis in some patients.\par High protein, low fat, low sugar, low salt (up to 2G/day) diet\par Gradual weight loss of 7-10% of current body weight.\par FibroScan ordered.\par \par Follow up in 1 year.

## 2023-01-11 NOTE — PHYSICAL EXAM
[Non-Tender] : non-tender [Smooth] : smooth [General Appearance - Alert] : alert [General Appearance - In No Acute Distress] : in no acute distress [Sclera] : the sclera and conjunctiva were normal [Outer Ear] : the ears and nose were normal in appearance [Oropharynx] : the oropharynx was normal [Neck Appearance] : the appearance of the neck was normal [Edema] : there was no peripheral edema [Bowel Sounds] : normal bowel sounds [Abdomen Soft] : soft [Abdomen Tenderness] : non-tender [Abdomen Mass (___ Cm)] : no abdominal mass palpated [Abnormal Walk] : normal gait [Nail Clubbing] : no clubbing  or cyanosis of the fingernails [Musculoskeletal - Swelling] : no joint swelling seen [Motor Tone] : muscle strength and tone were normal [Skin Color & Pigmentation] : normal skin color and pigmentation [Skin Turgor] : normal skin turgor [] : no rash [No Focal Deficits] : no focal deficits [Oriented To Time, Place, And Person] : oriented to person, place, and time [Impaired Insight] : insight and judgment were intact [Affect] : the affect was normal [Scleral Icterus] : No Scleral Icterus [Abdominal  Ascites] : no ascites [Splenomegaly] : no splenomegaly [Asterixis] : no asterixis observed [Jaundice] : No jaundice [Depression] : no depression [Hallucinations] : ~T no ~M hallucinations

## 2023-01-11 NOTE — HISTORY OF PRESENT ILLNESS
[de-identified] : AMRIK ROBLERO is a 67 year old female with a PMH significant for NAFLD, DM, HLD, HTN, Hypothyroidism, and GERD.\par \par 1/11/23: She presents today for evaluation of NAFLD. Pt states she has known about this diagnosis for a few years. Abdominal ultrasound done on 1/6/23 showed hepatic steatosis, no focal lesion. Most recent labs reviewed, LFTs are noraml, INR 1.0, , ALpha 1 normal, ceruloplasmin normal, iron studies normal except elevated ferritin of 322, AFP <1.8, Hepatitis C and Hepatitis B virus are negative, CATRACHITA and ASMA negative.\par \par Denies excessive alcohol consumption. Denies otc/herbal supplements. No new medications in the last 1 year. No recent travel. Denies family history of liver disease.

## 2023-02-06 ENCOUNTER — APPOINTMENT (OUTPATIENT)
Dept: GASTROENTEROLOGY | Facility: CLINIC | Age: 68
End: 2023-02-06

## 2023-02-10 ENCOUNTER — INPATIENT (INPATIENT)
Facility: HOSPITAL | Age: 68
LOS: 3 days | Discharge: HOME | DRG: 690 | End: 2023-02-14
Attending: STUDENT IN AN ORGANIZED HEALTH CARE EDUCATION/TRAINING PROGRAM | Admitting: HOSPITALIST
Payer: MEDICARE

## 2023-02-10 VITALS
OXYGEN SATURATION: 96 % | HEART RATE: 106 BPM | SYSTOLIC BLOOD PRESSURE: 113 MMHG | TEMPERATURE: 99 F | RESPIRATION RATE: 16 BRPM | DIASTOLIC BLOOD PRESSURE: 51 MMHG | HEIGHT: 63 IN | WEIGHT: 130.95 LBS

## 2023-02-10 DIAGNOSIS — Z98.51 TUBAL LIGATION STATUS: Chronic | ICD-10-CM

## 2023-02-10 DIAGNOSIS — Z98.891 HISTORY OF UTERINE SCAR FROM PREVIOUS SURGERY: Chronic | ICD-10-CM

## 2023-02-10 LAB
ALBUMIN SERPL ELPH-MCNC: 4.4 G/DL — SIGNIFICANT CHANGE UP (ref 3.3–5.2)
ALP SERPL-CCNC: 66 U/L — SIGNIFICANT CHANGE UP (ref 40–120)
ALT FLD-CCNC: 16 U/L — SIGNIFICANT CHANGE UP
ANION GAP SERPL CALC-SCNC: 11 MMOL/L — SIGNIFICANT CHANGE UP (ref 5–17)
APPEARANCE UR: ABNORMAL
AST SERPL-CCNC: 19 U/L — SIGNIFICANT CHANGE UP
BACTERIA # UR AUTO: ABNORMAL
BASOPHILS # BLD AUTO: 0.02 K/UL — SIGNIFICANT CHANGE UP (ref 0–0.2)
BASOPHILS NFR BLD AUTO: 0.2 % — SIGNIFICANT CHANGE UP (ref 0–2)
BILIRUB SERPL-MCNC: 0.7 MG/DL — SIGNIFICANT CHANGE UP (ref 0.4–2)
BILIRUB UR-MCNC: NEGATIVE — SIGNIFICANT CHANGE UP
BUN SERPL-MCNC: 15.9 MG/DL — SIGNIFICANT CHANGE UP (ref 8–20)
CALCIUM SERPL-MCNC: 9.4 MG/DL — SIGNIFICANT CHANGE UP (ref 8.4–10.5)
CHLORIDE SERPL-SCNC: 94 MMOL/L — LOW (ref 96–108)
CO2 SERPL-SCNC: 27 MMOL/L — SIGNIFICANT CHANGE UP (ref 22–29)
COLOR SPEC: YELLOW — SIGNIFICANT CHANGE UP
CREAT SERPL-MCNC: 0.86 MG/DL — SIGNIFICANT CHANGE UP (ref 0.5–1.3)
DIFF PNL FLD: ABNORMAL
EGFR: 74 ML/MIN/1.73M2 — SIGNIFICANT CHANGE UP
EOSINOPHIL # BLD AUTO: 0.01 K/UL — SIGNIFICANT CHANGE UP (ref 0–0.5)
EOSINOPHIL NFR BLD AUTO: 0.1 % — SIGNIFICANT CHANGE UP (ref 0–6)
EPI CELLS # UR: SIGNIFICANT CHANGE UP
FLUAV AG NPH QL: SIGNIFICANT CHANGE UP
FLUBV AG NPH QL: SIGNIFICANT CHANGE UP
GLUCOSE SERPL-MCNC: 211 MG/DL — HIGH (ref 70–99)
GLUCOSE UR QL: 250 MG/DL
HCT VFR BLD CALC: 38.5 % — SIGNIFICANT CHANGE UP (ref 34.5–45)
HGB BLD-MCNC: 13.2 G/DL — SIGNIFICANT CHANGE UP (ref 11.5–15.5)
IMM GRANULOCYTES NFR BLD AUTO: 0.2 % — SIGNIFICANT CHANGE UP (ref 0–0.9)
KETONES UR-MCNC: ABNORMAL
LACTATE BLDV-MCNC: 1 MMOL/L — SIGNIFICANT CHANGE UP (ref 0.5–2)
LEUKOCYTE ESTERASE UR-ACNC: ABNORMAL
LIDOCAIN IGE QN: 54 U/L — HIGH (ref 22–51)
LYMPHOCYTES # BLD AUTO: 1.22 K/UL — SIGNIFICANT CHANGE UP (ref 1–3.3)
LYMPHOCYTES # BLD AUTO: 14.9 % — SIGNIFICANT CHANGE UP (ref 13–44)
MCHC RBC-ENTMCNC: 29.5 PG — SIGNIFICANT CHANGE UP (ref 27–34)
MCHC RBC-ENTMCNC: 34.3 GM/DL — SIGNIFICANT CHANGE UP (ref 32–36)
MCV RBC AUTO: 85.9 FL — SIGNIFICANT CHANGE UP (ref 80–100)
MONOCYTES # BLD AUTO: 0.56 K/UL — SIGNIFICANT CHANGE UP (ref 0–0.9)
MONOCYTES NFR BLD AUTO: 6.9 % — SIGNIFICANT CHANGE UP (ref 2–14)
NEUTROPHILS # BLD AUTO: 6.34 K/UL — SIGNIFICANT CHANGE UP (ref 1.8–7.4)
NEUTROPHILS NFR BLD AUTO: 77.7 % — HIGH (ref 43–77)
NITRITE UR-MCNC: NEGATIVE — SIGNIFICANT CHANGE UP
PH UR: 6 — SIGNIFICANT CHANGE UP (ref 5–8)
PLATELET # BLD AUTO: 164 K/UL — SIGNIFICANT CHANGE UP (ref 150–400)
POTASSIUM SERPL-MCNC: 4.1 MMOL/L — SIGNIFICANT CHANGE UP (ref 3.5–5.3)
POTASSIUM SERPL-SCNC: 4.1 MMOL/L — SIGNIFICANT CHANGE UP (ref 3.5–5.3)
PROT SERPL-MCNC: 8 G/DL — SIGNIFICANT CHANGE UP (ref 6.6–8.7)
PROT UR-MCNC: 15
RBC # BLD: 4.48 M/UL — SIGNIFICANT CHANGE UP (ref 3.8–5.2)
RBC # FLD: 12.7 % — SIGNIFICANT CHANGE UP (ref 10.3–14.5)
RBC CASTS # UR COMP ASSIST: ABNORMAL /HPF (ref 0–4)
RSV RNA NPH QL NAA+NON-PROBE: SIGNIFICANT CHANGE UP
SARS-COV-2 RNA SPEC QL NAA+PROBE: SIGNIFICANT CHANGE UP
SODIUM SERPL-SCNC: 132 MMOL/L — LOW (ref 135–145)
SP GR SPEC: 1.01 — SIGNIFICANT CHANGE UP (ref 1.01–1.02)
UROBILINOGEN FLD QL: 1 MG/DL
WBC # BLD: 8.17 K/UL — SIGNIFICANT CHANGE UP (ref 3.8–10.5)
WBC # FLD AUTO: 8.17 K/UL — SIGNIFICANT CHANGE UP (ref 3.8–10.5)
WBC UR QL: ABNORMAL /HPF (ref 0–5)

## 2023-02-10 PROCEDURE — 99285 EMERGENCY DEPT VISIT HI MDM: CPT

## 2023-02-10 RX ORDER — ONDANSETRON 8 MG/1
4 TABLET, FILM COATED ORAL ONCE
Refills: 0 | Status: COMPLETED | OUTPATIENT
Start: 2023-02-10 | End: 2023-02-10

## 2023-02-10 RX ORDER — SODIUM CHLORIDE 9 MG/ML
1000 INJECTION INTRAMUSCULAR; INTRAVENOUS; SUBCUTANEOUS ONCE
Refills: 0 | Status: COMPLETED | OUTPATIENT
Start: 2023-02-10 | End: 2023-02-10

## 2023-02-10 RX ORDER — FAMOTIDINE 10 MG/ML
20 INJECTION INTRAVENOUS ONCE
Refills: 0 | Status: COMPLETED | OUTPATIENT
Start: 2023-02-10 | End: 2023-02-10

## 2023-02-10 RX ORDER — CEFTRIAXONE 500 MG/1
1000 INJECTION, POWDER, FOR SOLUTION INTRAMUSCULAR; INTRAVENOUS ONCE
Refills: 0 | Status: COMPLETED | OUTPATIENT
Start: 2023-02-10 | End: 2023-02-10

## 2023-02-10 RX ORDER — CEFTRIAXONE 500 MG/1
1000 INJECTION, POWDER, FOR SOLUTION INTRAMUSCULAR; INTRAVENOUS ONCE
Refills: 0 | Status: DISCONTINUED | OUTPATIENT
Start: 2023-02-10 | End: 2023-02-10

## 2023-02-10 RX ORDER — ACETAMINOPHEN 500 MG
1000 TABLET ORAL ONCE
Refills: 0 | Status: COMPLETED | OUTPATIENT
Start: 2023-02-10 | End: 2023-02-10

## 2023-02-10 RX ADMIN — FAMOTIDINE 20 MILLIGRAM(S): 10 INJECTION INTRAVENOUS at 19:09

## 2023-02-10 RX ADMIN — ONDANSETRON 4 MILLIGRAM(S): 8 TABLET, FILM COATED ORAL at 19:09

## 2023-02-10 RX ADMIN — CEFTRIAXONE 1000 MILLIGRAM(S): 500 INJECTION, POWDER, FOR SOLUTION INTRAMUSCULAR; INTRAVENOUS at 21:59

## 2023-02-10 RX ADMIN — Medication 400 MILLIGRAM(S): at 19:42

## 2023-02-10 RX ADMIN — SODIUM CHLORIDE 1000 MILLILITER(S): 9 INJECTION INTRAMUSCULAR; INTRAVENOUS; SUBCUTANEOUS at 19:08

## 2023-02-10 RX ADMIN — Medication 1000 MILLIGRAM(S): at 20:12

## 2023-02-10 NOTE — ED PROVIDER NOTE - NS ED ROS FT
Constitutional: (+) fever  (+)chills  (-)sweats  Eyes/ENT: (-) blurry vision, (-) epistaxis  (-)rhinorrhea   (-) sore throat    Cardiovascular: (-) chest pain, (-) palpitations (-) edema   Respiratory: (-) cough, (-) shortness of breath   Gastrointestinal: (+)nausea  (-)vomiting, (-) diarrhea  (+) abdominal pain   :  (+)dysuria, (-)frequency, (-)urgency, (-)hematuria  Musculoskeletal: (-) neck pain, (-) back pain, (-) joint pain  Integumentary: (-) rash, (-) edema  Neurological: (-) headache, (-) altered mental status  (-)LOC

## 2023-02-10 NOTE — ED PROVIDER NOTE - OBJECTIVE STATEMENT
HPI:  68 y/o F; with PMH signif for Hypothyroid,  HTN, IDDM; now p/w dysuria, flank pain, subjective  fevers, chills, suprapubic pain and nausea. Pt was seen at urgent care earlier had BP measured to be 60/44, (113/51 in triage in the ED, and was noted to have abnormal urine at urgent care as well and was advised to come to the ED. Pt notes she did not give herself her insulin last night as she was not feeling well. Pt notes UTI 2 months ago as well. Pt currently denies dizziness, lightheadedness.   PMH: HTN, DM, hypothyroidism  SOCIAL: +social EtOH use, denies tobacco/illicit drug use

## 2023-02-10 NOTE — ED PROVIDER NOTE - PHYSICAL EXAMINATION
General:     NAD, well-nourished, well-appearing  Head:     NC/AT, EOMI  Neck:     trachea midline  Lungs:     CTA b/l, no w/r/r  CVS:     S1S2, RRR, no m/g/r  Abd:     +BS, s/nd, no organomegaly, + suprapubic tenderness  Ext:    2+ radial and pedal pulses, no c/c/e  Neuro: AAOx3, no sensory/motor deficits

## 2023-02-10 NOTE — ED PROVIDER NOTE - CLINICAL SUMMARY MEDICAL DECISION MAKING FREE TEXT BOX
MARILOU Deanes: 68yo F PMH HTN, DM (on insulin), hypothyroidism presented to ED with urinary sx, fever and flank pain. upon PE, CVAT noted, suprapubic tenderness, low grade fever. labs WNL, no leukocytosis, lactate neg. UA revealed blood, leukos, bacteria. CT revealed pyelo without hydronephrosis. pt given IVF, rocephin, pain meds. pt had episode of vomiting here, unable to tolerate PO. reports some improvement of pain. pt adm to [facility] for further monitoring and IV abx.

## 2023-02-11 DIAGNOSIS — R78.81 BACTEREMIA: ICD-10-CM

## 2023-02-11 LAB
ANION GAP SERPL CALC-SCNC: 8 MMOL/L — SIGNIFICANT CHANGE UP (ref 5–17)
BUN SERPL-MCNC: 11.8 MG/DL — SIGNIFICANT CHANGE UP (ref 8–20)
CALCIUM SERPL-MCNC: 8.5 MG/DL — SIGNIFICANT CHANGE UP (ref 8.4–10.5)
CHLORIDE SERPL-SCNC: 98 MMOL/L — SIGNIFICANT CHANGE UP (ref 96–108)
CO2 SERPL-SCNC: 27 MMOL/L — SIGNIFICANT CHANGE UP (ref 22–29)
CREAT SERPL-MCNC: 0.69 MG/DL — SIGNIFICANT CHANGE UP (ref 0.5–1.3)
E COLI DNA BLD POS QL NAA+NON-PROBE: SIGNIFICANT CHANGE UP
EGFR: 96 ML/MIN/1.73M2 — SIGNIFICANT CHANGE UP
GLUCOSE BLDC GLUCOMTR-MCNC: 138 MG/DL — HIGH (ref 70–99)
GLUCOSE BLDC GLUCOMTR-MCNC: 200 MG/DL — HIGH (ref 70–99)
GLUCOSE BLDC GLUCOMTR-MCNC: 239 MG/DL — HIGH (ref 70–99)
GLUCOSE BLDC GLUCOMTR-MCNC: 256 MG/DL — HIGH (ref 70–99)
GLUCOSE SERPL-MCNC: 157 MG/DL — HIGH (ref 70–99)
GRAM STN FLD: SIGNIFICANT CHANGE UP
GRAM STN FLD: SIGNIFICANT CHANGE UP
HCT VFR BLD CALC: 34.4 % — LOW (ref 34.5–45)
HGB BLD-MCNC: 11.5 G/DL — SIGNIFICANT CHANGE UP (ref 11.5–15.5)
MCHC RBC-ENTMCNC: 28.8 PG — SIGNIFICANT CHANGE UP (ref 27–34)
MCHC RBC-ENTMCNC: 33.4 GM/DL — SIGNIFICANT CHANGE UP (ref 32–36)
MCV RBC AUTO: 86.2 FL — SIGNIFICANT CHANGE UP (ref 80–100)
METHOD TYPE: SIGNIFICANT CHANGE UP
PLATELET # BLD AUTO: 131 K/UL — LOW (ref 150–400)
POTASSIUM SERPL-MCNC: 4.2 MMOL/L — SIGNIFICANT CHANGE UP (ref 3.5–5.3)
POTASSIUM SERPL-SCNC: 4.2 MMOL/L — SIGNIFICANT CHANGE UP (ref 3.5–5.3)
RBC # BLD: 3.99 M/UL — SIGNIFICANT CHANGE UP (ref 3.8–5.2)
RBC # FLD: 12.5 % — SIGNIFICANT CHANGE UP (ref 10.3–14.5)
SODIUM SERPL-SCNC: 133 MMOL/L — LOW (ref 135–145)
SPECIMEN SOURCE: SIGNIFICANT CHANGE UP
SPECIMEN SOURCE: SIGNIFICANT CHANGE UP
WBC # BLD: 9.22 K/UL — SIGNIFICANT CHANGE UP (ref 3.8–10.5)
WBC # FLD AUTO: 9.22 K/UL — SIGNIFICANT CHANGE UP (ref 3.8–10.5)

## 2023-02-11 PROCEDURE — 99223 1ST HOSP IP/OBS HIGH 75: CPT

## 2023-02-11 PROCEDURE — 74177 CT ABD & PELVIS W/CONTRAST: CPT | Mod: 26,MA

## 2023-02-11 RX ORDER — DEXTROSE 50 % IN WATER 50 %
12.5 SYRINGE (ML) INTRAVENOUS ONCE
Refills: 0 | Status: DISCONTINUED | OUTPATIENT
Start: 2023-02-11 | End: 2023-02-14

## 2023-02-11 RX ORDER — CEFPODOXIME PROXETIL 100 MG
1 TABLET ORAL
Qty: 14 | Refills: 0
Start: 2023-02-11 | End: 2023-02-17

## 2023-02-11 RX ORDER — CEFTRIAXONE 500 MG/1
2000 INJECTION, POWDER, FOR SOLUTION INTRAMUSCULAR; INTRAVENOUS EVERY 24 HOURS
Refills: 0 | Status: DISCONTINUED | OUTPATIENT
Start: 2023-02-12 | End: 2023-02-14

## 2023-02-11 RX ORDER — CEFTRIAXONE 500 MG/1
1000 INJECTION, POWDER, FOR SOLUTION INTRAMUSCULAR; INTRAVENOUS ONCE
Refills: 0 | Status: DISCONTINUED | OUTPATIENT
Start: 2023-02-11 | End: 2023-02-11

## 2023-02-11 RX ORDER — METOCLOPRAMIDE HCL 10 MG
10 TABLET ORAL ONCE
Refills: 0 | Status: COMPLETED | OUTPATIENT
Start: 2023-02-11 | End: 2023-02-11

## 2023-02-11 RX ORDER — SODIUM CHLORIDE 9 MG/ML
1000 INJECTION, SOLUTION INTRAVENOUS
Refills: 0 | Status: DISCONTINUED | OUTPATIENT
Start: 2023-02-11 | End: 2023-02-14

## 2023-02-11 RX ORDER — INSULIN GLARGINE 100 [IU]/ML
18 INJECTION, SOLUTION SUBCUTANEOUS AT BEDTIME
Refills: 0 | Status: DISCONTINUED | OUTPATIENT
Start: 2023-02-11 | End: 2023-02-14

## 2023-02-11 RX ORDER — LEVOTHYROXINE SODIUM 125 MCG
1 TABLET ORAL
Qty: 0 | Refills: 0 | DISCHARGE

## 2023-02-11 RX ORDER — ROSUVASTATIN CALCIUM 5 MG/1
1 TABLET ORAL
Qty: 0 | Refills: 0 | DISCHARGE

## 2023-02-11 RX ORDER — SODIUM CHLORIDE 9 MG/ML
1000 INJECTION INTRAMUSCULAR; INTRAVENOUS; SUBCUTANEOUS ONCE
Refills: 0 | Status: COMPLETED | OUTPATIENT
Start: 2023-02-11 | End: 2023-02-11

## 2023-02-11 RX ORDER — INSULIN GLARGINE 100 [IU]/ML
18 INJECTION, SOLUTION SUBCUTANEOUS
Qty: 0 | Refills: 0 | DISCHARGE

## 2023-02-11 RX ORDER — GLUCAGON INJECTION, SOLUTION 0.5 MG/.1ML
1 INJECTION, SOLUTION SUBCUTANEOUS ONCE
Refills: 0 | Status: DISCONTINUED | OUTPATIENT
Start: 2023-02-11 | End: 2023-02-14

## 2023-02-11 RX ORDER — INSULIN LISPRO 100/ML
VIAL (ML) SUBCUTANEOUS
Refills: 0 | Status: DISCONTINUED | OUTPATIENT
Start: 2023-02-11 | End: 2023-02-14

## 2023-02-11 RX ORDER — DEXTROSE 50 % IN WATER 50 %
25 SYRINGE (ML) INTRAVENOUS ONCE
Refills: 0 | Status: DISCONTINUED | OUTPATIENT
Start: 2023-02-11 | End: 2023-02-14

## 2023-02-11 RX ORDER — CEFTRIAXONE 500 MG/1
1000 INJECTION, POWDER, FOR SOLUTION INTRAMUSCULAR; INTRAVENOUS ONCE
Refills: 0 | Status: COMPLETED | OUTPATIENT
Start: 2023-02-11 | End: 2023-02-11

## 2023-02-11 RX ORDER — INSULIN LISPRO 100/ML
2 VIAL (ML) SUBCUTANEOUS ONCE
Refills: 0 | Status: COMPLETED | OUTPATIENT
Start: 2023-02-11 | End: 2023-02-11

## 2023-02-11 RX ORDER — LEVOTHYROXINE SODIUM 125 MCG
112 TABLET ORAL DAILY
Refills: 0 | Status: DISCONTINUED | OUTPATIENT
Start: 2023-02-11 | End: 2023-02-14

## 2023-02-11 RX ORDER — CEFTRIAXONE 500 MG/1
1000 INJECTION, POWDER, FOR SOLUTION INTRAMUSCULAR; INTRAVENOUS EVERY 24 HOURS
Refills: 0 | Status: DISCONTINUED | OUTPATIENT
Start: 2023-02-11 | End: 2023-02-11

## 2023-02-11 RX ORDER — LISINOPRIL 2.5 MG/1
1 TABLET ORAL
Qty: 0 | Refills: 0 | DISCHARGE

## 2023-02-11 RX ORDER — ATORVASTATIN CALCIUM 80 MG/1
20 TABLET, FILM COATED ORAL AT BEDTIME
Refills: 0 | Status: DISCONTINUED | OUTPATIENT
Start: 2023-02-11 | End: 2023-02-14

## 2023-02-11 RX ORDER — ENOXAPARIN SODIUM 100 MG/ML
40 INJECTION SUBCUTANEOUS EVERY 24 HOURS
Refills: 0 | Status: DISCONTINUED | OUTPATIENT
Start: 2023-02-11 | End: 2023-02-14

## 2023-02-11 RX ORDER — DEXTROSE 50 % IN WATER 50 %
15 SYRINGE (ML) INTRAVENOUS ONCE
Refills: 0 | Status: DISCONTINUED | OUTPATIENT
Start: 2023-02-11 | End: 2023-02-14

## 2023-02-11 RX ORDER — ACETAMINOPHEN 500 MG
650 TABLET ORAL EVERY 6 HOURS
Refills: 0 | Status: DISCONTINUED | OUTPATIENT
Start: 2023-02-11 | End: 2023-02-14

## 2023-02-11 RX ORDER — LISINOPRIL 2.5 MG/1
10 TABLET ORAL DAILY
Refills: 0 | Status: DISCONTINUED | OUTPATIENT
Start: 2023-02-11 | End: 2023-02-14

## 2023-02-11 RX ORDER — ACETAMINOPHEN 500 MG
1000 TABLET ORAL ONCE
Refills: 0 | Status: COMPLETED | OUTPATIENT
Start: 2023-02-11 | End: 2023-02-11

## 2023-02-11 RX ORDER — KETOROLAC TROMETHAMINE 30 MG/ML
30 SYRINGE (ML) INJECTION ONCE
Refills: 0 | Status: DISCONTINUED | OUTPATIENT
Start: 2023-02-11 | End: 2023-02-11

## 2023-02-11 RX ADMIN — Medication 10 MILLIGRAM(S): at 03:11

## 2023-02-11 RX ADMIN — Medication 1: at 12:05

## 2023-02-11 RX ADMIN — SODIUM CHLORIDE 1000 MILLILITER(S): 9 INJECTION INTRAMUSCULAR; INTRAVENOUS; SUBCUTANEOUS at 06:33

## 2023-02-11 RX ADMIN — Medication 400 MILLIGRAM(S): at 03:18

## 2023-02-11 RX ADMIN — Medication 112 MICROGRAM(S): at 09:31

## 2023-02-11 RX ADMIN — LISINOPRIL 10 MILLIGRAM(S): 2.5 TABLET ORAL at 12:05

## 2023-02-11 RX ADMIN — Medication 1000 MILLIGRAM(S): at 03:48

## 2023-02-11 RX ADMIN — ATORVASTATIN CALCIUM 20 MILLIGRAM(S): 80 TABLET, FILM COATED ORAL at 23:08

## 2023-02-11 RX ADMIN — Medication 650 MILLIGRAM(S): at 15:06

## 2023-02-11 RX ADMIN — Medication 30 MILLIGRAM(S): at 03:11

## 2023-02-11 RX ADMIN — CEFTRIAXONE 1000 MILLIGRAM(S): 500 INJECTION, POWDER, FOR SOLUTION INTRAMUSCULAR; INTRAVENOUS at 16:19

## 2023-02-11 RX ADMIN — CEFTRIAXONE 1000 MILLIGRAM(S): 500 INJECTION, POWDER, FOR SOLUTION INTRAMUSCULAR; INTRAVENOUS at 06:29

## 2023-02-11 RX ADMIN — INSULIN GLARGINE 18 UNIT(S): 100 INJECTION, SOLUTION SUBCUTANEOUS at 23:09

## 2023-02-11 RX ADMIN — ENOXAPARIN SODIUM 40 MILLIGRAM(S): 100 INJECTION SUBCUTANEOUS at 23:08

## 2023-02-11 RX ADMIN — Medication 650 MILLIGRAM(S): at 15:36

## 2023-02-11 RX ADMIN — Medication 30 MILLIGRAM(S): at 03:41

## 2023-02-11 NOTE — ED ADULT NURSE REASSESSMENT NOTE - NSIMPLEMENTINTERV_GEN_ALL_ED
Called and spoke to patient regarding motorized scooter. Gave her the information where the referral was sent. Patient verbalized understanding.   Implemented All Universal Safety Interventions:  [place] to call system. Call bell, personal items and telephone within reach. Instruct patient to call for assistance. Room bathroom lighting operational. Non-slip footwear when patient is off stretcher. Physically safe environment: no spills, clutter or unnecessary equipment. Stretcher in lowest position, wheels locked, appropriate side rails in place.

## 2023-02-11 NOTE — H&P ADULT - HISTORY OF PRESENT ILLNESS
66yo F with PMH of DM, HTN, Hypothyroidism presented to ER with c/o fever, chills, poor appetite, cloudy urine, back pain, she went to urgent care yesterday where her BP was found ot be 60/40 and she was brought to [facility] ER, In ER, she was hemodynamically stable, was febrile, labs fairly wnl,. UA consistent with UTI, CT abd showed bilateral pyelonephritis. she was started on iv ceftriaxone and observed,  she was found to have E coli bacteremia. She is now being admitted for further care. In ER, she has chills, poor urine output, endorses h/o [id number] ago, was treated with po antibiotics, dizziness on standing up.

## 2023-02-11 NOTE — ED ADULT NURSE REASSESSMENT NOTE - NS ED NURSE REASSESS COMMENT FT1
Pt febrile 101.3, VSS afebrile. Dr Steele notified. Awaiting orders. Report given to oNel
Assumed care of the patient @0730. Pt A&Ox4, VSs afebrile. Pt denies c/o pain at this time. Patient in understanding of plan of care. Patient with no further questions for the RN. Resting in comfort. Call bell within reach and encouraged to use when assistance needed. Will continue to monitor.
pt received from Ene VELA at 0643. no s/s of acute distress, pt resting comfortably on stretcher. pt denies chest pain/pressure/tightness, palpitations, sob/dyspnea, dizziness/headache/lightheadedness/blurry vision, tingling/numbness, n/v/d. c/o fever/chills, dysuria, burning with urination, and dizziness x 4 days. c/o 5/10 pain to right flank. pt is able to make needs known. call bell in reach and encouraged to use when assistance is needed. ED  Oleksandr utilized for interaction.

## 2023-02-11 NOTE — ED CDU PROVIDER DISPOSITION NOTE - CONDITION AT DISCHARGE:
[General Appearance - Well Developed] : well developed [General Appearance - Well Nourished] : well nourished [Normal Appearance] : normal appearance [Well Groomed] : well groomed [General Appearance - In No Acute Distress] : no acute distress Improved [Edema] : no peripheral edema [Respiration, Rhythm And Depth] : normal respiratory rhythm and effort [Exaggerated Use Of Accessory Muscles For Inspiration] : no accessory muscle use [Abdomen Soft] : soft [Costovertebral Angle Tenderness] : no ~M costovertebral angle tenderness [Abdomen Tenderness] : non-tender [Urethral Meatus] : meatus normal [Urinary Bladder Findings] : the bladder was normal on palpation [Scrotum] : the scrotum was normal [Testes Mass (___cm)] : there were no testicular masses [Normal Station and Gait] : the gait and station were normal for the patient's age [] : no rash [No Focal Deficits] : no focal deficits [Oriented To Time, Place, And Person] : oriented to person, place, and time [Affect] : the affect was normal [Mood] : the mood was normal [Not Anxious] : not anxious [No Palpable Adenopathy] : no palpable adenopathy [FreeTextEntry1] : Right prostate nodule

## 2023-02-11 NOTE — ED CDU PROVIDER DISPOSITION NOTE - CLINICAL COURSE
68 y/o F; with PMH signif for Hypothyroid,  HTN, IDDM; now p/w dysuria, flank pain, subjective  fevers, chills, suprapubic pain and nausea. Pt was seen at urgent care earlier had BP measured to be 60/44, (113/51 in triage in the ED, and was noted to have abnormal urine at urgent care as well and was advised to come to the ED. Pt notes she did not give herself her insulin last night as she was not feeling well. pt received 2 doses of ceftriaxone while in ED, pt afebrile while in ED, Pt grew out gram negative rods in both blood cultures, Discussed need to admit with patient & discussed risk and benefits.  Patient agreed to admission.  Discussed case w/ admitting medicine doctor - agreed to admit to their service.
68 y/o F; with PMH signif for Hypothyroid,  HTN, IDDM; now p/w dysuria, flank pain, subjective  fevers, chills, suprapubic pain and nausea. Pt was seen at urgent care earlier had BP measured to be 60/44, (113/51 in triage in the ED, and was noted to have abnormal urine at urgent care as well and was advised to come to the ED. Pt notes she did not give herself her insulin last night as she was not feeling well. pt with fever in ED, urine consistent with uti, ct showed bilateral pyelo, pt received 2 dose of ceftriaxone while in ED, no fevers today, Pt reassessed, pt feeling better at this time, vss, pt able to walk, talk and vocalized plan of action. Discussed in depth and explained to pt in depth the next steps that need to be taking including proper follow up with PCP or specialists. All incidental findings were discussed with pt as well. Pt verbalized their concerns and all questions were answered. Pt understands dispo and wants discharge. Given good instructions when to return to ED and importance of f/u. Discussed with patient and son at bedside

## 2023-02-11 NOTE — ED CDU PROVIDER INITIAL DAY NOTE - CLINICAL SUMMARY MEDICAL DECISION MAKING FREE TEXT BOX
67F with pmh IDDM, HTN, hypothyroidism presenting with dysuria, flank pain, fevers, chills. In ED, pt febrile, UA+, CT concerning for b/l pyelo without hydro. CVA tenderness. Started on ceftriaxone and fluids. Observation to monitor for improvement and IV abx.

## 2023-02-11 NOTE — ED CDU PROVIDER DISPOSITION NOTE - PATIENT PORTAL LINK FT
You can access the FollowMyHealth Patient Portal offered by [facility] by registering at the following website: [email or web address]. By joining [email or web address]’s FollowMyHealth portal, you will also be able to view your health information using other applications (apps) compatible with our system.

## 2023-02-11 NOTE — H&P ADULT - NSHPPHYSICALEXAM_GEN_ALL_CORE
PHYSICAL EXAM:    GENERAL: NAD,  well-developed, shaking 2/2 chills  HEAD:  Atraumatic, Normocephalic  EYES: EOMI, PERRLA, conjunctiva and sclera clear  ENMT:  dry  mucous membranes,   NERVOUS SYSTEM:  Alert & Oriented X3, Good concentration; Motor Strength 5/5 B/L upper and lower extremities;   CHEST/LUNG: Clear to percussion bilaterally; No rales, rhonchi,   HEART: Regular rate and rhythm; No murmurs,   ABDOMEN: Soft, Nontender, Nondistended; Bowel sounds present  EXTREMITIES:  No clubbing, cyanosis, or edema  BACk - bilateral CVA tenderness   SKIN: No rashes or lesions

## 2023-02-11 NOTE — ED CDU PROVIDER INITIAL DAY NOTE - PHYSICAL EXAMINATION
General:     NAD, well-nourished, well-appearing  Head:     NC/AT, EOMI  Neck:     trachea midline  Lungs:     CTA b/l, no w/r/r  CVS:     S1S2, RRR, no m/g/r  Abd:     +BS, s/nd, no organomegaly, + suprapubic tenderness. +CVA tenderness   Ext:    2+ radial and pedal pulses, no c/c/e  Neuro: AAOx3, no sensory/motor deficits

## 2023-02-11 NOTE — H&P ADULT - ASSESSMENT
66yo F with PMH of DM, HTN, Hypothyroidism presented to ER with c/o fever, chills, poor appetite, cloudy urine, back pain, she went to urgent care yesterday where her BP was found ot be 60/40 and she was brought to [facility] ER, In ER, she was hemodynamically stable, was febrile, labs fairly wnl,. UA consistent with UTI, CT abd showed bilateral pyelonephritis. she was started on iv ceftriaxone and observed,  she was found to have E coli bacteremia. She is now being admitted for further care. In ER, she has chills, poor urine output, endorses h/o [id number] ago, was treated with po antibiotics, dizziness on standing up.     # E coli bacteremia likely secondary to bilateral acute pyelonephritis   start ceftriaxone 2gm iv daily   monitor labs   f/u repeat blood cx, f/u C/s   Consider ID eval if not responding.     # DM - takes 18u lantus at home   ct here, SSI#2     # HTN - on lisinopril   monitor BP    # Hypothyroidism - ct levothyroxine     # VTE px - lovenox

## 2023-02-11 NOTE — ED ADULT NURSE REASSESSMENT NOTE - COMFORT CARE
meal provided/plan of care explained/po fluids offered/repositioned/wait time explained/warm blanket provided

## 2023-02-12 LAB
ANION GAP SERPL CALC-SCNC: 9 MMOL/L — SIGNIFICANT CHANGE UP (ref 5–17)
BUN SERPL-MCNC: 9.7 MG/DL — SIGNIFICANT CHANGE UP (ref 8–20)
CALCIUM SERPL-MCNC: 9 MG/DL — SIGNIFICANT CHANGE UP (ref 8.4–10.5)
CHLORIDE SERPL-SCNC: 98 MMOL/L — SIGNIFICANT CHANGE UP (ref 96–108)
CO2 SERPL-SCNC: 28 MMOL/L — SIGNIFICANT CHANGE UP (ref 22–29)
CREAT SERPL-MCNC: 0.58 MG/DL — SIGNIFICANT CHANGE UP (ref 0.5–1.3)
EGFR: 100 ML/MIN/1.73M2 — SIGNIFICANT CHANGE UP
GLUCOSE BLDC GLUCOMTR-MCNC: 106 MG/DL — HIGH (ref 70–99)
GLUCOSE BLDC GLUCOMTR-MCNC: 168 MG/DL — HIGH (ref 70–99)
GLUCOSE BLDC GLUCOMTR-MCNC: 179 MG/DL — HIGH (ref 70–99)
GLUCOSE SERPL-MCNC: 133 MG/DL — HIGH (ref 70–99)
HCT VFR BLD CALC: 34.7 % — SIGNIFICANT CHANGE UP (ref 34.5–45)
HCV AB S/CO SERPL IA: 0.06 S/CO — SIGNIFICANT CHANGE UP (ref 0–0.99)
HCV AB SERPL-IMP: SIGNIFICANT CHANGE UP
HGB BLD-MCNC: 11.5 G/DL — SIGNIFICANT CHANGE UP (ref 11.5–15.5)
MCHC RBC-ENTMCNC: 28.8 PG — SIGNIFICANT CHANGE UP (ref 27–34)
MCHC RBC-ENTMCNC: 33.1 GM/DL — SIGNIFICANT CHANGE UP (ref 32–36)
MCV RBC AUTO: 86.8 FL — SIGNIFICANT CHANGE UP (ref 80–100)
PLATELET # BLD AUTO: 146 K/UL — LOW (ref 150–400)
POTASSIUM SERPL-MCNC: 4.2 MMOL/L — SIGNIFICANT CHANGE UP (ref 3.5–5.3)
POTASSIUM SERPL-SCNC: 4.2 MMOL/L — SIGNIFICANT CHANGE UP (ref 3.5–5.3)
RBC # BLD: 4 M/UL — SIGNIFICANT CHANGE UP (ref 3.8–5.2)
RBC # FLD: 12.6 % — SIGNIFICANT CHANGE UP (ref 10.3–14.5)
SODIUM SERPL-SCNC: 135 MMOL/L — SIGNIFICANT CHANGE UP (ref 135–145)
WBC # BLD: 6.74 K/UL — SIGNIFICANT CHANGE UP (ref 3.8–10.5)
WBC # FLD AUTO: 6.74 K/UL — SIGNIFICANT CHANGE UP (ref 3.8–10.5)

## 2023-02-12 PROCEDURE — 99233 SBSQ HOSP IP/OBS HIGH 50: CPT

## 2023-02-12 RX ORDER — POLYETHYLENE GLYCOL 3350 17 G/17G
17 POWDER, FOR SOLUTION ORAL DAILY
Refills: 0 | Status: DISCONTINUED | OUTPATIENT
Start: 2023-02-12 | End: 2023-02-14

## 2023-02-12 RX ORDER — SODIUM CHLORIDE 9 MG/ML
1000 INJECTION INTRAMUSCULAR; INTRAVENOUS; SUBCUTANEOUS
Refills: 0 | Status: DISCONTINUED | OUTPATIENT
Start: 2023-02-12 | End: 2023-02-14

## 2023-02-12 RX ORDER — SENNA PLUS 8.6 MG/1
2 TABLET ORAL AT BEDTIME
Refills: 0 | Status: DISCONTINUED | OUTPATIENT
Start: 2023-02-12 | End: 2023-02-14

## 2023-02-12 RX ADMIN — LISINOPRIL 10 MILLIGRAM(S): 2.5 TABLET ORAL at 05:10

## 2023-02-12 RX ADMIN — POLYETHYLENE GLYCOL 3350 17 GRAM(S): 17 POWDER, FOR SOLUTION ORAL at 11:48

## 2023-02-12 RX ADMIN — INSULIN GLARGINE 18 UNIT(S): 100 INJECTION, SOLUTION SUBCUTANEOUS at 21:54

## 2023-02-12 RX ADMIN — ATORVASTATIN CALCIUM 20 MILLIGRAM(S): 80 TABLET, FILM COATED ORAL at 21:54

## 2023-02-12 RX ADMIN — SODIUM CHLORIDE 75 MILLILITER(S): 9 INJECTION INTRAMUSCULAR; INTRAVENOUS; SUBCUTANEOUS at 17:07

## 2023-02-12 RX ADMIN — Medication 112 MICROGRAM(S): at 05:10

## 2023-02-12 RX ADMIN — Medication 1: at 17:07

## 2023-02-12 RX ADMIN — CEFTRIAXONE 2000 MILLIGRAM(S): 500 INJECTION, POWDER, FOR SOLUTION INTRAMUSCULAR; INTRAVENOUS at 05:10

## 2023-02-12 RX ADMIN — SENNA PLUS 2 TABLET(S): 8.6 TABLET ORAL at 21:54

## 2023-02-12 NOTE — PATIENT PROFILE ADULT - FALL HARM RISK - HARM RISK INTERVENTIONS
Communicate Risk of Fall with Harm to all staff/Reinforce activity limits and safety measures with patient and family/Tailored Fall Risk Interventions/Visual Cue: Yellow wristband and red socks/Bed in lowest position, wheels locked, appropriate side rails in place/Call bell, personal items and telephone in reach/Instruct patient to call for assistance before getting out of bed or chair/Non-slip footwear when patient is out of bed/[place] to call system/Physically safe environment - no spills, clutter or unnecessary equipment/Purposeful Proactive Rounding/Room/bathroom lighting operational, light cord in reach No pertinent past medical history

## 2023-02-13 LAB
-  AMIKACIN: SIGNIFICANT CHANGE UP
-  AMIKACIN: SIGNIFICANT CHANGE UP
-  AMOXICILLIN/CLAVULANIC ACID: SIGNIFICANT CHANGE UP
-  AMPICILLIN/SULBACTAM: SIGNIFICANT CHANGE UP
-  AMPICILLIN/SULBACTAM: SIGNIFICANT CHANGE UP
-  AMPICILLIN: SIGNIFICANT CHANGE UP
-  AMPICILLIN: SIGNIFICANT CHANGE UP
-  AZTREONAM: SIGNIFICANT CHANGE UP
-  AZTREONAM: SIGNIFICANT CHANGE UP
-  CEFAZOLIN: SIGNIFICANT CHANGE UP
-  CEFAZOLIN: SIGNIFICANT CHANGE UP
-  CEFEPIME: SIGNIFICANT CHANGE UP
-  CEFEPIME: SIGNIFICANT CHANGE UP
-  CEFOXITIN: SIGNIFICANT CHANGE UP
-  CEFOXITIN: SIGNIFICANT CHANGE UP
-  CEFTRIAXONE: SIGNIFICANT CHANGE UP
-  CEFTRIAXONE: SIGNIFICANT CHANGE UP
-  CEFUROXIME: SIGNIFICANT CHANGE UP
-  CIPROFLOXACIN: SIGNIFICANT CHANGE UP
-  CIPROFLOXACIN: SIGNIFICANT CHANGE UP
-  ERTAPENEM: SIGNIFICANT CHANGE UP
-  ERTAPENEM: SIGNIFICANT CHANGE UP
-  GENTAMICIN: SIGNIFICANT CHANGE UP
-  GENTAMICIN: SIGNIFICANT CHANGE UP
-  IMIPENEM: SIGNIFICANT CHANGE UP
-  IMIPENEM: SIGNIFICANT CHANGE UP
-  LEVOFLOXACIN: SIGNIFICANT CHANGE UP
-  LEVOFLOXACIN: SIGNIFICANT CHANGE UP
-  MEROPENEM: SIGNIFICANT CHANGE UP
-  MEROPENEM: SIGNIFICANT CHANGE UP
-  NITROFURANTOIN: SIGNIFICANT CHANGE UP
-  PIPERACILLIN/TAZOBACTAM: SIGNIFICANT CHANGE UP
-  PIPERACILLIN/TAZOBACTAM: SIGNIFICANT CHANGE UP
-  TOBRAMYCIN: SIGNIFICANT CHANGE UP
-  TOBRAMYCIN: SIGNIFICANT CHANGE UP
-  TRIMETHOPRIM/SULFAMETHOXAZOLE: SIGNIFICANT CHANGE UP
-  TRIMETHOPRIM/SULFAMETHOXAZOLE: SIGNIFICANT CHANGE UP
ANION GAP SERPL CALC-SCNC: 10 MMOL/L — SIGNIFICANT CHANGE UP (ref 5–17)
BUN SERPL-MCNC: 9.8 MG/DL — SIGNIFICANT CHANGE UP (ref 8–20)
CALCIUM SERPL-MCNC: 8.7 MG/DL — SIGNIFICANT CHANGE UP (ref 8.4–10.5)
CHLORIDE SERPL-SCNC: 102 MMOL/L — SIGNIFICANT CHANGE UP (ref 96–108)
CO2 SERPL-SCNC: 26 MMOL/L — SIGNIFICANT CHANGE UP (ref 22–29)
CREAT SERPL-MCNC: 0.53 MG/DL — SIGNIFICANT CHANGE UP (ref 0.5–1.3)
CULTURE RESULTS: SIGNIFICANT CHANGE UP
EGFR: 102 ML/MIN/1.73M2 — SIGNIFICANT CHANGE UP
GLUCOSE BLDC GLUCOMTR-MCNC: 134 MG/DL — HIGH (ref 70–99)
GLUCOSE BLDC GLUCOMTR-MCNC: 192 MG/DL — HIGH (ref 70–99)
GLUCOSE BLDC GLUCOMTR-MCNC: 239 MG/DL — HIGH (ref 70–99)
GLUCOSE BLDC GLUCOMTR-MCNC: 283 MG/DL — HIGH (ref 70–99)
GLUCOSE SERPL-MCNC: 147 MG/DL — HIGH (ref 70–99)
HCT VFR BLD CALC: 35.7 % — SIGNIFICANT CHANGE UP (ref 34.5–45)
HGB BLD-MCNC: 11.7 G/DL — SIGNIFICANT CHANGE UP (ref 11.5–15.5)
MCHC RBC-ENTMCNC: 28.7 PG — SIGNIFICANT CHANGE UP (ref 27–34)
MCHC RBC-ENTMCNC: 32.8 GM/DL — SIGNIFICANT CHANGE UP (ref 32–36)
MCV RBC AUTO: 87.7 FL — SIGNIFICANT CHANGE UP (ref 80–100)
METHOD TYPE: SIGNIFICANT CHANGE UP
METHOD TYPE: SIGNIFICANT CHANGE UP
ORGANISM # SPEC MICROSCOPIC CNT: SIGNIFICANT CHANGE UP
PLATELET # BLD AUTO: 169 K/UL — SIGNIFICANT CHANGE UP (ref 150–400)
POTASSIUM SERPL-MCNC: 3.9 MMOL/L — SIGNIFICANT CHANGE UP (ref 3.5–5.3)
POTASSIUM SERPL-SCNC: 3.9 MMOL/L — SIGNIFICANT CHANGE UP (ref 3.5–5.3)
RBC # BLD: 4.07 M/UL — SIGNIFICANT CHANGE UP (ref 3.8–5.2)
RBC # FLD: 12.6 % — SIGNIFICANT CHANGE UP (ref 10.3–14.5)
SODIUM SERPL-SCNC: 138 MMOL/L — SIGNIFICANT CHANGE UP (ref 135–145)
SPECIMEN SOURCE: SIGNIFICANT CHANGE UP
WBC # BLD: 4.6 K/UL — SIGNIFICANT CHANGE UP (ref 3.8–10.5)
WBC # FLD AUTO: 4.6 K/UL — SIGNIFICANT CHANGE UP (ref 3.8–10.5)

## 2023-02-13 PROCEDURE — 99223 1ST HOSP IP/OBS HIGH 75: CPT

## 2023-02-13 PROCEDURE — 99233 SBSQ HOSP IP/OBS HIGH 50: CPT

## 2023-02-13 RX ADMIN — SENNA PLUS 2 TABLET(S): 8.6 TABLET ORAL at 22:02

## 2023-02-13 RX ADMIN — INSULIN GLARGINE 18 UNIT(S): 100 INJECTION, SOLUTION SUBCUTANEOUS at 22:01

## 2023-02-13 RX ADMIN — Medication 1: at 11:51

## 2023-02-13 RX ADMIN — Medication 2: at 16:29

## 2023-02-13 RX ADMIN — ENOXAPARIN SODIUM 40 MILLIGRAM(S): 100 INJECTION SUBCUTANEOUS at 00:16

## 2023-02-13 RX ADMIN — Medication 112 MICROGRAM(S): at 06:01

## 2023-02-13 RX ADMIN — ENOXAPARIN SODIUM 40 MILLIGRAM(S): 100 INJECTION SUBCUTANEOUS at 22:01

## 2023-02-13 RX ADMIN — POLYETHYLENE GLYCOL 3350 17 GRAM(S): 17 POWDER, FOR SOLUTION ORAL at 12:47

## 2023-02-13 RX ADMIN — LISINOPRIL 10 MILLIGRAM(S): 2.5 TABLET ORAL at 06:01

## 2023-02-13 RX ADMIN — CEFTRIAXONE 2000 MILLIGRAM(S): 500 INJECTION, POWDER, FOR SOLUTION INTRAMUSCULAR; INTRAVENOUS at 06:01

## 2023-02-13 RX ADMIN — ATORVASTATIN CALCIUM 20 MILLIGRAM(S): 80 TABLET, FILM COATED ORAL at 22:02

## 2023-02-13 NOTE — CONSULT NOTE ADULT - SUBJECTIVE AND OBJECTIVE BOX
Call placed to patient, notified. [facility]  INFECTIOUS DISEASES at [place] and [place]  =====================================================                               Darion Gambino MD*    Pam Lugo MD*                             Angela Cota MD*     Marie Caruso MD*            Diplomates American Board of Internal Medicine & Infectious Diseases                * [place] Office - Appt - Tel  [phone] Fax [phone]                * [place] Office - Appt - Tel [phone] Fax [phone]                                  Hospital Consult line:  [phone]  =====================================================      N-[id number]  AMRIK ROBLERO        CC: Patient is a 67y old  Female who presents with a chief complaint of Chills, fever, back pain (2023 13:01)      HPI:  68yo F with PMH of DM, HTN, Hypothyroidism presented to ER with c/o fever, chills, poor appetite, cloudy urine, back pain, she went to urgent care yesterday where her BP was found ot be 60/40 and she was brought to [facility] ER, In ER, she was hemodynamically stable, was febrile, labs fairly wnl,. UA consistent with UTI, CT abd showed bilateral pyelonephritis. she was started on iv ceftriaxone and observed,  she was found to have E coli bacteremia. She is now being admitted for further care. In ER, she has chills, poor urine output, endorses h/o [id number] ago, was treated with po antibiotics, dizziness on standing up.  (2023 14:05)    ID consulted for discharge recommendations.     Afebrile for ~48 hours. Hemodynamically stable. On my exam feeling better - admits to residual bilateral flank pain R >L. Appetite still poor but improving. No fever or chills. voiding w/o difficulty.  ______________________________________________________  PAST MEDICAL & SURGICAL HISTORY:  Diabetes    HTN (hypertension)    Thyroid disorder    H/O:  section    H/O tubal ligation        Social History:  lives with daughter and granddaughter     of COVID a year ago   No tobacco, alcohol or illicit drugs  Originally from [facility]       FAMILY HISTORY:  Paternal aunt with DM and CAD     ______________________________________________________  Allergies    morphine (Rash)    Intolerances      ______________________________________________________  MEDICATIONS:  Antibiotics:  cefTRIAXone Injectable. 2000 milliGRAM(s) IV Push every 24 hours    Other medications:  atorvastatin 20 milliGRAM(s) Oral at bedtime  dextrose 5%. 1000 milliLiter(s) IV Continuous <Continuous>  dextrose 5%. 1000 milliLiter(s) IV Continuous <Continuous>  dextrose 50% Injectable 25 Gram(s) IV Push once  dextrose 50% Injectable 12.5 Gram(s) IV Push once  dextrose 50% Injectable 25 Gram(s) IV Push once  enoxaparin Injectable 40 milliGRAM(s) SubCutaneous every 24 hours  glucagon  Injectable 1 milliGRAM(s) IntraMuscular once  insulin glargine Injectable (LANTUS) 18 Unit(s) SubCutaneous at bedtime  insulin lispro (ADMELOG) corrective regimen sliding scale   SubCutaneous three times a day before meals  levothyroxine 112 MICROGram(s) Oral daily  lisinopril 10 milliGRAM(s) Oral daily  polyethylene glycol 3350 17 Gram(s) Oral daily  senna 2 Tablet(s) Oral at bedtime  sodium chloride 0.9%. 1000 milliLiter(s) IV Continuous <Continuous>    ______________________________________________________  REVIEW OF SYSTEMS:  CONSTITUTIONAL:  as per HPi   HEENT:  No diplopia or blurred vision.  No earache, sore throat or runny nose.  CARDIOVASCULAR:  No chest pain  RESPIRATORY:  No cough, shortness of breath  GASTROINTESTINAL: as per HPI   GENITOURINARY:  as per HPI   MUSCULOSKELETAL:  no joint aches, no muscle pain  SKIN:  No change in skin, hair or nails.  NEUROLOGIC:  No headaches, seizures  PSYCHIATRIC:  No disorder of thought or mood.  ENDOCRINE:  No heat or cold intolerance  HEMATOLOGICAL:  No easy bruising or bleeding.     _____________________________________________________  PHYSICAL EXAM:  GEN: awake, alert, oriented x 3. Sitting in bed, in no acute distress   HEENT: normocephalic and atraumatic. EOMI. PERRL.  Anicteric sclerae. Moist mucous membranes. No mucosal lesions. No nasal discharge.   NECK: Supple. No palpable neck masses or LN  LUNGS: eupneic, CTA B/L, no adventitious sounds  HEART: RRR, no m/r/g  ABDOMEN: Soft, NT, ND, no HSM, no palpable masses.  +BS.    : mild CVA tenderness, no Cárdenas catheter  EXTREMITIES: well perfused, without  edema.  MSK: No joint deformity or swelling  LYMPH: no palpable cervical, supraclavicular lymph nodes  NEUROLOGIC: Grossly no focal deficits   PSYCHIATRIC: Appropriate affect and mood.  SKIN: No rash, wounds or jaundice  LINES: PIV     ______________________________________________________      Vitals:  T(F): 98 (2023 09:04), Max: 99.5 (2023 17:25)  HR: 77 (2023 09:04)  BP: 134/58 (2023 09:04)  RR: 19 (2023 09:04)  SpO2: 94% (2023 09:04) (93% - 94%)  temp max in last 48H T(F): , Max: 100.6 (23 @ 14:47)    Current Antibiotics:  cefTRIAXone Injectable. 2000 milliGRAM(s) IV Push every 24 hours    Other medications:  atorvastatin 20 milliGRAM(s) Oral at bedtime  dextrose 5%. 1000 milliLiter(s) IV Continuous <Continuous>  dextrose 5%. 1000 milliLiter(s) IV Continuous <Continuous>  dextrose 50% Injectable 25 Gram(s) IV Push once  dextrose 50% Injectable 12.5 Gram(s) IV Push once  dextrose 50% Injectable 25 Gram(s) IV Push once  enoxaparin Injectable 40 milliGRAM(s) SubCutaneous every 24 hours  glucagon  Injectable 1 milliGRAM(s) IntraMuscular once  insulin glargine Injectable (LANTUS) 18 Unit(s) SubCutaneous at bedtime  insulin lispro (ADMELOG) corrective regimen sliding scale   SubCutaneous three times a day before meals  levothyroxine 112 MICROGram(s) Oral daily  lisinopril 10 milliGRAM(s) Oral daily  polyethylene glycol 3350 17 Gram(s) Oral daily  senna 2 Tablet(s) Oral at bedtime  sodium chloride 0.9%. 1000 milliLiter(s) IV Continuous <Continuous>                            11.7   4.60  )-----------( 169      ( 2023 05:26 )             35.7         138  |  102  |  9.8  ----------------------------<  147<H>  3.9   |  26.0  |  0.53    Ca    8.7      2023 05:26      RECENT CULTURES:   @ 08:30 Clean Catch Clean Catch (Midstream)     <10,000 CFU/mL Normal Urogenital Aminah        02-10 @ 19:10 Clean Catch Clean Catch (Midstream)     >100,000 CFU/ml Escherichia coli        02-10 @ 19:05 .Blood Blood-Peripheral Blood Culture PCR  Escherichia coli      -  Amikacin: S <=16    -  Ampicillin: S <=8 These ampicillin results predict results for amoxicillin    -  Ampicillin/Sulbactam: S <=4/2 Enterobacter, Klebsiella aerogenes, Citrobacter, and Serratia may develop resistance during prolonged therapy (3-4 days)    -  Aztreonam: S <=4    -  Cefazolin: S <=2 Enterobacter, Klebsiella aerogenes, Citrobacter, and Serratia may develop resistance during prolonged therapy (3-4 days)    -  Cefepime: S <=2    -  Cefoxitin: S <=8    -  Ceftriaxone: S <=1 Enterobacter, Klebsiella aerogenes, Citrobacter, and Serratia may develop resistance during prolonged therapy    -  Ciprofloxacin: I 0.5    -  Ertapenem: S <=0.5    -  Gentamicin: S <=2    -  Imipenem: S <=1    -  Levofloxacin: S <=0.5    -  Meropenem: S <=1    -  Piperacillin/Tazobactam: S <=8    -  Tobramycin: S <=2    -  Trimethoprim/Sulfamethoxazole: S <=0.5/9.5       02-10 @ 19:00 .Blood Blood-Peripheral     Growth in anaerobic bottle: Escherichia coli  See previous culture [id number]        WBC Count: 4.60 K/uL (23 @ 05:26)  WBC Count: 6.74 K/uL (23 @ 09:06)  WBC Count: 9.22 K/uL (23 @ 14:00)  WBC Count: 8.17 K/uL (02-10-23 @ 19:02)    Creatinine, Serum: 0.53 mg/dL (23 @ 05:26)  Creatinine, Serum: 0.58 mg/dL (23 @ 09:06)  Creatinine, Serum: 0.69 mg/dL (23 @ 14:00)  Creatinine, Serum: 0.86 mg/dL (02-10-23 @ 19:02)      SARS-CoV-2 Result: NotDetec (02-10-23 @ 19:02)    ______________________________________________________  RADIOLOGY  < from: CT Abdomen and Pelvis w/ IV Cont (23 @ 00:49) >  FINDINGS:  LOWER CHEST: Within normal limits.    LIVER: Within normal limits.  BILE DUCTS: Normal caliber.  GALLBLADDER: Within normal limits.  SPLEEN: Within normal limits.  PANCREAS: Within normal limits.  ADRENALS: Within normal limits.  KIDNEYS/URETERS: Although images are mildly degraded by motion artifact.   Patchy areas of hypoenhancement suggest pyelonephritis. No   hydronephrosis. Redemonstration of a 1.3 cm complex cystic lesion in the   right upper pole with peripheral calcification.    BLADDER: Although the urinary bladder is incompletely distended, there   appears to be hyperemia raising the possibility of cystitis.  REPRODUCTIVE ORGANS: No pelvic masses.    BOWEL: No bowel obstruction. Appendix is not visualized. The colon is   moderately distended with stool with moderate looping suggesting chronic   constipation.  PERITONEUM: No ascites.  VESSELS: Aorta is not dilated. Moderate atherosclerotic vascular   calcification.  RETROPERITONEUM/LYMPH NODES: No lymphadenopathy.  ABDOMINAL WALL: Within normal limits.  BONES: Within normal limits.    IMPRESSION:    Both kidneys demonstrate patchy areas of hypoenhancement suggesting   pyelonephritis. No discrete focal drainable collection. No   hydronephrosis. Mild thickening of the urinary bladder suggesting   cystitis.    Moderate fecal load.    < end of copied text >

## 2023-02-13 NOTE — PROGRESS NOTE ADULT - ASSESSMENT
66yo F with PMH of DM, HTN, Hypothyroidism presented to ER with c/o fever, chills, poor appetite, cloudy urine, back pain, she went to urgent care yesterday where her BP was found ot be 60/40 and she was brought to [facility] ER, In ER, she was hemodynamically stable, was febrile, labs fairly wnl,. UA consistent with UTI, CT abd showed bilateral pyelonephritis. she was started on iv ceftriaxone and observed,  she was found to have E coli bacteremia. She is now being admitted for further care. In ER, she has chills, poor urine output, endorses h/o [id number] ago, was treated with po antibiotics, dizziness on standing up.     # E coli bacteremia likely secondary to bilateral acute pyelonephritis   Blood cx with E.coli  C/w ceftriaxone 2gm iv daily   monitor labs   f/u repeat blood cx, f/u C/s       # DM - takes 18u lantus at home   ct here, SSI#2     # HTN - on lisinopril   monitor BP    # Hypothyroidism - ct levothyroxine     # VTE px - lovenox     Spoke to family at bedside  Dispo: Pending course
66yo F with PMH of DM, HTN, Hypothyroidism presented to ER with c/o fever, chills, poor appetite, cloudy urine, back pain, she went to urgent care yesterday where her BP was found ot be 60/40 and she was brought to [facility] ER, In ER, she was hemodynamically stable, was febrile, labs fairly wnl,. UA consistent with UTI, CT abd showed bilateral pyelonephritis. she was started on iv ceftriaxone and observed,  she was found to have E coli bacteremia. She is now being admitted for further care. In ER, she has chills, poor urine output, endorses h/o [id number] ago, was treated with po antibiotics, dizziness on standing up.     # E coli bacteremia likely secondary to bilateral acute pyelonephritis   Blood cx with E.coli  C/w ceftriaxone 2gm iv daily   monitor labs   f/u repeat blood cx, f/u C/s   ID consulted      # DM - takes 18u lantus at home   ct here, SSI#2     # HTN - on lisinopril   monitor BP    # Hypothyroidism - ct levothyroxine     # VTE px - lovenox     Spoke to family at bedside on 2/12  Dispo: Pending course - Possible DC in AM if cx are neg

## 2023-02-13 NOTE — CONSULT NOTE ADULT - ASSESSMENT
68yo F with PMH of DM, HTN, Hypothyroidism admitted on 2/11 with bilateral pyelonephritis, and E. coli bacteremia treated with ceftriaxone.     cUTI  Pyelonephritis  Bacteremia     - Blood isolate with intermediate susceptibility to cipro, rest sensitive  - CT with b/l pyelo; no stones or structural abnormalities reported   - Repeat BCx pending   - UCx with >100K E.coli; susceptibilities pending  - if repeat BCx neg, can discharge on oral Bactrim DS 1 tab PO BID through 2/19  - No leukocytosis  - Currently afebrile and hemodynamically stable     D/w Dr. Delgado

## 2023-02-13 NOTE — PROGRESS NOTE ADULT - SUBJECTIVE AND OBJECTIVE BOX
Hospitalist Daily Progress Note    Chief Complaint:  Patient is a 67y old  Female who presents with a chief complaint of Chills, fever, back pain (12 Feb 2023 13:18)      SUBJECTIVE / OVERNIGHT EVENTS:  Patient was seen and examined at bedside. Lao translated by [id number]. States to be feeling better.   Patient denies chest pain, SOB, abd pain, N/V, fever, chills, dysuria or any other complaints. All remainder ROS negative.     MEDICATIONS  (STANDING):  atorvastatin 20 milliGRAM(s) Oral at bedtime  cefTRIAXone Injectable. 2000 milliGRAM(s) IV Push every 24 hours  dextrose 5%. 1000 milliLiter(s) (100 mL/Hr) IV Continuous <Continuous>  dextrose 5%. 1000 milliLiter(s) (50 mL/Hr) IV Continuous <Continuous>  dextrose 50% Injectable 25 Gram(s) IV Push once  dextrose 50% Injectable 12.5 Gram(s) IV Push once  dextrose 50% Injectable 25 Gram(s) IV Push once  enoxaparin Injectable 40 milliGRAM(s) SubCutaneous every 24 hours  glucagon  Injectable 1 milliGRAM(s) IntraMuscular once  insulin glargine Injectable (LANTUS) 18 Unit(s) SubCutaneous at bedtime  insulin lispro (ADMELOG) corrective regimen sliding scale   SubCutaneous three times a day before meals  levothyroxine 112 MICROGram(s) Oral daily  lisinopril 10 milliGRAM(s) Oral daily  polyethylene glycol 3350 17 Gram(s) Oral daily  senna 2 Tablet(s) Oral at bedtime  sodium chloride 0.9%. 1000 milliLiter(s) (75 mL/Hr) IV Continuous <Continuous>    MEDICATIONS  (PRN):  acetaminophen     Tablet .. 650 milliGRAM(s) Oral every 6 hours PRN Temp greater or equal to 38C (100.4F), Mild Pain (1 - 3)  dextrose Oral Gel 15 Gram(s) Oral once PRN Blood Glucose LESS THAN 70 milliGRAM(s)/deciliter        I&O's Summary    12 Feb 2023 07:01  -  13 Feb 2023 07:00  --------------------------------------------------------  IN: 570 mL / OUT: 0 mL / NET: 570 mL        PHYSICAL EXAM:  Vital Signs Last 24 Hrs  T(C): 36.7 (13 Feb 2023 09:04), Max: 37.5 (12 Feb 2023 17:25)  T(F): 98 (13 Feb 2023 09:04), Max: 99.5 (12 Feb 2023 17:25)  HR: 77 (13 Feb 2023 09:04) (73 - 77)  BP: 134/58 (13 Feb 2023 09:04) (122/59 - 137/77)  BP(mean): --  RR: 19 (13 Feb 2023 09:04) (18 - 19)  SpO2: 94% (13 Feb 2023 09:04) (93% - 94%)    Parameters below as of 13 Feb 2023 09:04  Patient On (Oxygen Delivery Method): room air          Constitutional: NAD, Resting  ENT: Supple, No JVD  Lungs: CTA B/L, Non-labored breathing  Cardio: RRR, S1/S2, No murmur  Abdomen: Soft, Nontender, Nondistended; Bowel sounds present, mild cva tenderness on right side  Extremities: No calf tenderness, No pitting edema  Musculoskeletal:   No clubbing or cyanosis of digits; no joint swelling or tenderness to palpation  Psych: Calm, cooperative affect appropriate  Neuro: Awake and alert, oriented x 4  Skin: No rashes; no palpable lesions    LABS:                        11.7   4.60  )-----------( 169      ( 13 Feb 2023 05:26 )             35.7     02-13    138  |  102  |  9.8  ----------------------------<  147<H>  3.9   |  26.0  |  0.53    Ca    8.7      13 Feb 2023 05:26                Culture - Urine (collected 11 Feb 2023 08:30)  Source: Clean Catch Clean Catch (Midstream)  Final Report (13 Feb 2023 00:20):    <10,000 CFU/mL Normal Urogenital Aminah    Culture - Urine (collected 10 Feb 2023 19:10)  Source: Clean Catch Clean Catch (Midstream)  Preliminary Report (12 Feb 2023 14:22):    >100,000 CFU/ml Escherichia coli    Culture - Blood (collected 10 Feb 2023 19:05)  Source: .Blood Blood-Peripheral  Gram Stain (11 Feb 2023 12:04):    Growth in anaerobic bottle: Gram Negative Rods  Final Report (13 Feb 2023 07:33):    Growth in anaerobic bottle: Escherichia coli    ***Blood Panel PCR results on this specimen are available    approximately 3 hours after the Gram stain result.***    Gram stain, PCR, and/or culture results may not always    correspond due to difference in methodologies.    ************************************************************    This PCR assay was performed by multiplex PCR. This    Assay tests for 66 bacterial and resistance gene targets.    Please refer to the [facility] test directory    at [email or web address] for details.  Organism: Blood Culture PCR  Escherichia coli (13 Feb 2023 07:33)  Organism: Escherichia coli (13 Feb 2023 07:33)  Organism: Blood Culture PCR (13 Feb 2023 07:33)    Culture - Blood (collected 10 Feb 2023 19:00)  Source: .Blood Blood-Peripheral  Gram Stain (11 Feb 2023 12:11):    Growth in anaerobic bottle: Gram Negative Rods  Final Report (13 Feb 2023 07:34):    Growth in anaerobic bottle: Escherichia coli    See previous culture [id number]      CAPILLARY BLOOD GLUCOSE      POCT Blood Glucose.: 192 mg/dL (13 Feb 2023 11:48)  POCT Blood Glucose.: 134 mg/dL (13 Feb 2023 07:39)  POCT Blood Glucose.: 179 mg/dL (12 Feb 2023 21:53)  POCT Blood Glucose.: 168 mg/dL (12 Feb 2023 16:54)        RADIOLOGY REVIEWED  
Hospitalist Daily Progress Note    Chief Complaint:  Patient is a 67y old  Female who presents with a chief complaint of Chills, fever, back pain (2023 14:05)      SUBJECTIVE / OVERNIGHT EVENTS:  Patient was seen and examined at bedside. French translated by [id number]. Complains of fatigue and not feeling like herself.   Patient denies chest pain, SOB, abd pain, N/V, fever, chills, dysuria or any other complaints. All remainder ROS negative.     MEDICATIONS  (STANDING):  atorvastatin 20 milliGRAM(s) Oral at bedtime  cefTRIAXone Injectable. 2000 milliGRAM(s) IV Push every 24 hours  dextrose 5%. 1000 milliLiter(s) (100 mL/Hr) IV Continuous <Continuous>  dextrose 5%. 1000 milliLiter(s) (50 mL/Hr) IV Continuous <Continuous>  dextrose 50% Injectable 25 Gram(s) IV Push once  dextrose 50% Injectable 12.5 Gram(s) IV Push once  dextrose 50% Injectable 25 Gram(s) IV Push once  enoxaparin Injectable 40 milliGRAM(s) SubCutaneous every 24 hours  glucagon  Injectable 1 milliGRAM(s) IntraMuscular once  insulin glargine Injectable (LANTUS) 18 Unit(s) SubCutaneous at bedtime  insulin lispro (ADMELOG) corrective regimen sliding scale   SubCutaneous three times a day before meals  levothyroxine 112 MICROGram(s) Oral daily  lisinopril 10 milliGRAM(s) Oral daily  polyethylene glycol 3350 17 Gram(s) Oral daily  senna 2 Tablet(s) Oral at bedtime    MEDICATIONS  (PRN):  acetaminophen     Tablet .. 650 milliGRAM(s) Oral every 6 hours PRN Temp greater or equal to 38C (100.4F), Mild Pain (1 - 3)  dextrose Oral Gel 15 Gram(s) Oral once PRN Blood Glucose LESS THAN 70 milliGRAM(s)/deciliter        I&O's Summary      PHYSICAL EXAM:  Vital Signs Last 24 Hrs  T(C): 37.1 (2023 11:22), Max: 38.5 (2023 14:20)  T(F): 98.7 (2023 11:22), Max: 101.3 (2023 14:20)  HR: 82 (2023 11:22) (80 - 97)  BP: 121/71 (2023 11:22) (106/61 - 134/74)  BP(mean): --  RR: 18 (2023 11:22) (16 - 18)  SpO2: 96% (2023 11:22) (64% - 97%)    Parameters below as of 2023 11:22  Patient On (Oxygen Delivery Method): room air          Constitutional: NAD, Resting  ENT: Supple, No JVD  Lungs: CTA B/L, Non-labored breathing  Cardio: RRR, S1/S2, No murmur  Abdomen: Soft, Nontender, Nondistended; Bowel sounds present, b/l cva tenderness  Extremities: No calf tenderness, No pitting edema  Musculoskeletal:   No clubbing or cyanosis of digits; no joint swelling or tenderness to palpation  Psych: Calm, cooperative affect appropriate  Neuro: Awake and alert, oriented x 4  Skin: No rashes; no palpable lesions    LABS:                        11.5   6.74  )-----------( 146      ( 2023 09:06 )             34.7     02-12    135  |  98  |  9.7  ----------------------------<  133<H>  4.2   |  28.0  |  0.58    Ca    9.0      2023 09:06    TPro  8.0  /  Alb  4.4  /  TBili  0.7  /  DBili  x   /  AST  19  /  ALT  16  /  AlkPhos  66  02-10          Urinalysis Basic - ( 10 Feb 2023 19:10 )    Color: Yellow / Appearance: very cloudy / S.015 / pH: x  Gluc: x / Ketone: Trace  / Bili: Negative / Urobili: 1 mg/dL   Blood: x / Protein: 15 / Nitrite: Negative   Leuk Esterase: Moderate / RBC: 6-10 /HPF / WBC 26-50 /HPF   Sq Epi: x / Non Sq Epi: Occasional / Bacteria: Occasional        Culture - Blood (collected 10 Feb 2023 19:05)  Source: .Blood Blood-Peripheral  Gram Stain (2023 12:04):    Growth in anaerobic bottle: Gram Negative Rods  Preliminary Report (2023 11:50):    Growth in anaerobic bottle: Escherichia coli    ***Blood Panel PCR results on this specimen are available    approximately 3 hours after the Gram stain result.***    Gram stain, PCR, and/or culture results may not always    correspond due to difference in methodologies.    ************************************************************    This PCR assay was performed by multiplex PCR. This    Assay tests for 66 bacterial and resistance gene targets.    Please refer to the [facility] Labs test directory    at [email or web address] for details.  Organism: Blood Culture PCR (2023 13:17)  Organism: Blood Culture PCR (2023 13:17)    Culture - Blood (collected 10 Feb 2023 19:00)  Source: .Blood Blood-Peripheral  Gram Stain (2023 12:11):    Growth in anaerobic bottle: Gram Negative Rods  Preliminary Report (2023 11:57):    Growth in anaerobic bottle: Escherichia coli    See previous culture [id number]      CAPILLARY BLOOD GLUCOSE      POCT Blood Glucose.: 106 mg/dL (2023 07:55)  POCT Blood Glucose.: 256 mg/dL (2023 22:45)  POCT Blood Glucose.: 138 mg/dL (2023 18:01)        RADIOLOGY REVIEWED

## 2023-02-14 ENCOUNTER — TRANSCRIPTION ENCOUNTER (OUTPATIENT)
Age: 68
End: 2023-02-14

## 2023-02-14 VITALS
TEMPERATURE: 99 F | HEART RATE: 78 BPM | DIASTOLIC BLOOD PRESSURE: 69 MMHG | RESPIRATION RATE: 18 BRPM | SYSTOLIC BLOOD PRESSURE: 146 MMHG | OXYGEN SATURATION: 96 %

## 2023-02-14 LAB
ANION GAP SERPL CALC-SCNC: 9 MMOL/L — SIGNIFICANT CHANGE UP (ref 5–17)
BUN SERPL-MCNC: 12.3 MG/DL — SIGNIFICANT CHANGE UP (ref 8–20)
CALCIUM SERPL-MCNC: 9.2 MG/DL — SIGNIFICANT CHANGE UP (ref 8.4–10.5)
CHLORIDE SERPL-SCNC: 99 MMOL/L — SIGNIFICANT CHANGE UP (ref 96–108)
CO2 SERPL-SCNC: 28 MMOL/L — SIGNIFICANT CHANGE UP (ref 22–29)
CREAT SERPL-MCNC: 0.61 MG/DL — SIGNIFICANT CHANGE UP (ref 0.5–1.3)
EGFR: 99 ML/MIN/1.73M2 — SIGNIFICANT CHANGE UP
GLUCOSE BLDC GLUCOMTR-MCNC: 223 MG/DL — HIGH (ref 70–99)
GLUCOSE BLDC GLUCOMTR-MCNC: 235 MG/DL — HIGH (ref 70–99)
GLUCOSE SERPL-MCNC: 192 MG/DL — HIGH (ref 70–99)
HCT VFR BLD CALC: 35.3 % — SIGNIFICANT CHANGE UP (ref 34.5–45)
HGB BLD-MCNC: 11.7 G/DL — SIGNIFICANT CHANGE UP (ref 11.5–15.5)
MCHC RBC-ENTMCNC: 28.8 PG — SIGNIFICANT CHANGE UP (ref 27–34)
MCHC RBC-ENTMCNC: 33.1 GM/DL — SIGNIFICANT CHANGE UP (ref 32–36)
MCV RBC AUTO: 86.9 FL — SIGNIFICANT CHANGE UP (ref 80–100)
PLATELET # BLD AUTO: 187 K/UL — SIGNIFICANT CHANGE UP (ref 150–400)
POTASSIUM SERPL-MCNC: 4.9 MMOL/L — SIGNIFICANT CHANGE UP (ref 3.5–5.3)
POTASSIUM SERPL-SCNC: 4.9 MMOL/L — SIGNIFICANT CHANGE UP (ref 3.5–5.3)
RBC # BLD: 4.06 M/UL — SIGNIFICANT CHANGE UP (ref 3.8–5.2)
RBC # FLD: 12.4 % — SIGNIFICANT CHANGE UP (ref 10.3–14.5)
SODIUM SERPL-SCNC: 135 MMOL/L — SIGNIFICANT CHANGE UP (ref 135–145)
WBC # BLD: 4.9 K/UL — SIGNIFICANT CHANGE UP (ref 3.8–10.5)
WBC # FLD AUTO: 4.9 K/UL — SIGNIFICANT CHANGE UP (ref 3.8–10.5)

## 2023-02-14 PROCEDURE — 80048 BASIC METABOLIC PNL TOTAL CA: CPT

## 2023-02-14 PROCEDURE — G0378: CPT

## 2023-02-14 PROCEDURE — 99285 EMERGENCY DEPT VISIT HI MDM: CPT | Mod: 25

## 2023-02-14 PROCEDURE — 87077 CULTURE AEROBIC IDENTIFY: CPT

## 2023-02-14 PROCEDURE — 96375 TX/PRO/DX INJ NEW DRUG ADDON: CPT

## 2023-02-14 PROCEDURE — 81001 URINALYSIS AUTO W/SCOPE: CPT

## 2023-02-14 PROCEDURE — 85027 COMPLETE CBC AUTOMATED: CPT

## 2023-02-14 PROCEDURE — 86803 HEPATITIS C AB TEST: CPT

## 2023-02-14 PROCEDURE — 87150 DNA/RNA AMPLIFIED PROBE: CPT

## 2023-02-14 PROCEDURE — 82962 GLUCOSE BLOOD TEST: CPT

## 2023-02-14 PROCEDURE — 83605 ASSAY OF LACTIC ACID: CPT

## 2023-02-14 PROCEDURE — 87186 SC STD MICRODIL/AGAR DIL: CPT

## 2023-02-14 PROCEDURE — 87637 SARSCOV2&INF A&B&RSV AMP PRB: CPT

## 2023-02-14 PROCEDURE — 99239 HOSP IP/OBS DSCHRG MGMT >30: CPT

## 2023-02-14 PROCEDURE — 36415 COLL VENOUS BLD VENIPUNCTURE: CPT

## 2023-02-14 PROCEDURE — 96374 THER/PROPH/DIAG INJ IV PUSH: CPT

## 2023-02-14 PROCEDURE — 74177 CT ABD & PELVIS W/CONTRAST: CPT | Mod: MA

## 2023-02-14 PROCEDURE — 85025 COMPLETE CBC W/AUTO DIFF WBC: CPT

## 2023-02-14 PROCEDURE — 80053 COMPREHEN METABOLIC PANEL: CPT

## 2023-02-14 PROCEDURE — 83690 ASSAY OF LIPASE: CPT

## 2023-02-14 PROCEDURE — 87086 URINE CULTURE/COLONY COUNT: CPT

## 2023-02-14 PROCEDURE — 96376 TX/PRO/DX INJ SAME DRUG ADON: CPT

## 2023-02-14 PROCEDURE — 87040 BLOOD CULTURE FOR BACTERIA: CPT

## 2023-02-14 RX ADMIN — Medication 2: at 12:28

## 2023-02-14 RX ADMIN — CEFTRIAXONE 2000 MILLIGRAM(S): 500 INJECTION, POWDER, FOR SOLUTION INTRAMUSCULAR; INTRAVENOUS at 07:01

## 2023-02-14 RX ADMIN — Medication 112 MICROGRAM(S): at 06:20

## 2023-02-14 RX ADMIN — LISINOPRIL 10 MILLIGRAM(S): 2.5 TABLET ORAL at 06:21

## 2023-02-14 RX ADMIN — Medication 2: at 08:24

## 2023-02-14 NOTE — DISCHARGE NOTE PROVIDER - NSDCFUSCHEDAPPT_GEN_ALL_CORE_FT
Elizabeth Parnell  [facility] Physician [facility] [address]   Scheduled Appointment: 05/08/2023

## 2023-02-14 NOTE — DISCHARGE NOTE PROVIDER - NSDCMRMEDTOKEN_GEN_ALL_CORE_FT
Cherelle Bactrim  mg-160 mg oral tablet: 1 tab(s) orally every 12 hours   insulin glargine 100 units/mL subcutaneous solution: 18 unit(s) subcutaneous once a day (at bedtime)  lisinopril 10 mg oral tablet: 1 tab(s) orally once a day  rosuvastatin 5 mg oral tablet: 1 tab(s) orally once a day  Synthroid 112 mcg (0.112 mg) oral tablet: 1 tab(s) orally once a day

## 2023-02-14 NOTE — DISCHARGE NOTE PROVIDER - HOSPITAL COURSE
68yo F with PMH of DM, HTN, Hypothyroidism presented to ER with c/o fever, chills, poor appetite, cloudy urine, back pain, she went to urgent care yesterday where her BP was found ot be 60/40 and she was brought to [facility] ER, In ER, she was hemodynamically stable, was febrile, labs fairly wnl,. UA consistent with UTI, CT abd showed bilateral pyelonephritis. she was started on iv ceftriaxone and observed,  she was found to have E coli bacteremia. She is now being admitted for further care. Patient was admitted for E coli bacteremia likely secondary to bilateral acute pyelonephritis. Patient was started on ceftriaxone 2 g daily. Repeat blood cx were negative. Seen by ID and optimized. Patient improved and wants to go home. Medically stable for DC home.

## 2023-02-14 NOTE — DISCHARGE NOTE PROVIDER - ATTENDING DISCHARGE PHYSICAL EXAMINATION:
PHYSICAL EXAM:  Vital Signs Last 24 Hrs  T(F): 98.3 (14 Feb 2023 10:20), Max: 98.4 (13 Feb 2023 16:35)  HR: 64 (14 Feb 2023 10:20) (64 - 80)  BP: 148/75 (14 Feb 2023 10:20) (130/63 - 148/75)  RR: 18 (14 Feb 2023 10:20) (18 - 18)  SpO2: 95% (14 Feb 2023 10:20) (94% - 97%)  Urdu translated by [id number]  GENERAL: NAD, Resting in bed  Eyes: EOMI, PERRLA  ENMT: Conjunctiva and sclera clear; supple neck, No JVD  Cardiovascular: S1,S2, RRR, No murmur  Respiratory: CTA B/L, Non-labored breathing  GI: Bowel sounds present; Soft, Nontender, Nondistended. No hepatomegaly  Genitourinary: Deferred  Skin:  no breakdowns, ulcers or discharge, No rashes or lesions  Neurology: Alert & Oriented X3, non-focal and spontaneous movements of all extremities, CN 2 to 12 grossly intact   Psych: Appropriate mood and affect, calm, pleasant, Responds appropriately to questions

## 2023-02-14 NOTE — DISCHARGE NOTE NURSING/CASE MANAGEMENT/SOCIAL WORK - PATIENT PORTAL LINK FT
You can access the FollowMyHealth Patient Portal offered by [facility] by registering at the following website: [email or web address]. By joining [employer]’s FollowMyHealth portal, you will also be able to view your health information using other applications (apps) compatible with our system.

## 2023-02-14 NOTE — DISCHARGE NOTE PROVIDER - NSDCCPCAREPLAN_GEN_ALL_CORE_FT
PRINCIPAL DISCHARGE DIAGNOSIS  Diagnosis: Bacteremia  Assessment and Plan of Treatment: Please continue to take abx as prescribed. Please follow up with your PCP.      SECONDARY DISCHARGE DIAGNOSES  Diagnosis: Pyelonephritis  Assessment and Plan of Treatment: Please continue to take abx as prescribed. Please follow up with your PCP.

## 2023-02-17 LAB
CULTURE RESULTS: SIGNIFICANT CHANGE UP
SPECIMEN SOURCE: SIGNIFICANT CHANGE UP

## 2023-02-27 NOTE — PATIENT PROFILE ADULT - NSPROHMSYMPCOND_GEN_A_NUR
[id number] diabetes

## 2023-03-10 NOTE — ED ADULT TRIAGE NOTE - WEIGHT IN LBS
Wound Care Instructions    Keep the bandage in place for 24 hours. Cleanse the wound with mild soapy water daily  Gently dry the area. Apply Vaseline or petroleum jelly to the wound using a cotton tipped applicator. Cover with a clean bandage. Repeat this process until the site is healed.   You may shower and bathe as usual.
138

## 2023-03-28 ENCOUNTER — APPOINTMENT (OUTPATIENT)
Dept: GASTROENTEROLOGY | Facility: CLINIC | Age: 68
End: 2023-03-28
Payer: MEDICARE

## 2023-03-28 VITALS
TEMPERATURE: 96.9 F | RESPIRATION RATE: 16 BRPM | OXYGEN SATURATION: 99 % | HEART RATE: 79 BPM | SYSTOLIC BLOOD PRESSURE: 124 MMHG | HEIGHT: 63 IN | WEIGHT: 139 LBS | DIASTOLIC BLOOD PRESSURE: 70 MMHG | BODY MASS INDEX: 24.63 KG/M2

## 2023-03-28 DIAGNOSIS — K76.0 FATTY (CHANGE OF) LIVER, NOT ELSEWHERE CLASSIFIED: ICD-10-CM

## 2023-03-28 DIAGNOSIS — K59.09 OTHER CONSTIPATION: ICD-10-CM

## 2023-03-28 DIAGNOSIS — K21.9 GASTRO-ESOPHAGEAL REFLUX DISEASE W/OUT ESOPHAGITIS: ICD-10-CM

## 2023-03-28 PROCEDURE — 99214 OFFICE O/P EST MOD 30 MIN: CPT

## 2023-03-28 RX ORDER — LIDOCAINE HYDROCHLORIDE 20 MG/ML
2 SOLUTION ORAL; TOPICAL
Qty: 50 | Refills: 0 | Status: DISCONTINUED | COMMUNITY
Start: 2021-12-21 | End: 2023-03-28

## 2023-03-28 RX ORDER — POLYETHYLENE GLYCOL 3350 17 G/17G
17 POWDER, FOR SOLUTION ORAL
Qty: 510 | Refills: 0 | Status: DISCONTINUED | COMMUNITY
Start: 2022-09-26 | End: 2023-03-28

## 2023-03-28 RX ORDER — AZITHROMYCIN 250 MG/1
250 TABLET, FILM COATED ORAL
Qty: 6 | Refills: 0 | Status: DISCONTINUED | COMMUNITY
Start: 2022-01-03 | End: 2023-03-28

## 2023-03-28 RX ORDER — KETOCONAZOLE 20 MG/G
2 CREAM TOPICAL
Qty: 60 | Refills: 0 | Status: DISCONTINUED | COMMUNITY
Start: 2022-10-18 | End: 2023-03-28

## 2023-03-28 RX ORDER — OMEPRAZOLE 40 MG/1
40 CAPSULE, DELAYED RELEASE ORAL
Qty: 30 | Refills: 2 | Status: ACTIVE | COMMUNITY
Start: 2020-06-09 | End: 1900-01-01

## 2023-03-28 RX ORDER — MUPIROCIN 20 MG/G
2 OINTMENT TOPICAL
Qty: 66 | Refills: 0 | Status: DISCONTINUED | COMMUNITY
Start: 2022-09-23 | End: 2023-03-28

## 2023-03-28 RX ORDER — LISINOPRIL 10 MG/1
10 TABLET ORAL
Qty: 90 | Refills: 0 | Status: DISCONTINUED | COMMUNITY
Start: 2022-09-07 | End: 2023-03-28

## 2023-03-28 RX ORDER — METRONIDAZOLE 7.5 MG/G
0.75 GEL VAGINAL
Qty: 1 | Refills: 2 | Status: DISCONTINUED | COMMUNITY
Start: 2022-06-16 | End: 2023-03-28

## 2023-03-28 RX ORDER — IBUPROFEN 600 MG/1
600 TABLET, FILM COATED ORAL
Qty: 45 | Refills: 0 | Status: DISCONTINUED | COMMUNITY
Start: 2021-12-21 | End: 2023-03-28

## 2023-03-28 NOTE — ASSESSMENT
[FreeTextEntry1] : Patient is a 67 year old female, with PMH of DMII, HTN, HLD, fatty liver, chronic GERD, hypothyroidism, who presents for follow up visit. \par \par Fatty liver\par Will update labs to include CBC, CMP, PT/INR and AFP\par Will update imaging with abdominal U/S, particularly with her RUQ pain\par Will schedule Fibroscan\par \par Acid reflux, start Omeprazole 40mg PO QD\par \par Chronic constipation, no relief with Miralax in the past - Will try Lactulose\par \par RTC in 3 months, sooner if needed \par \par I evaluated this patient with my ACP Archana and agree with the above assessment and management plan for further work-up and evaluation of nonalcoholic fatty liver disease.  Will prescribe omeprazole for worsening reflux and lactulose for constipation.  She had recent negative EGD and colonoscopy done in March 2022.  Patient appeared to understand all of the above instructions, information, and management plan which were explained to her today in Angolan by myself who speaks Angolan.

## 2023-03-28 NOTE — HISTORY OF PRESENT ILLNESS
[de-identified] : March 8, 2022. Hiatal hernia. 12/10/2018. Gastritis. 4/7/2017. Esophagitis and gastritis. 2/5/2010. Gastritis. 4/28/2009. Gastritis.  [FreeTextEntry1] : March 8, 2022 internal hemorrhoids. 11/20/2017. Internal hemorrhoids.

## 2023-03-28 NOTE — ADDENDUM
[FreeTextEntry1] : I, Archana More PA-C, acted as a scribe for the services dictated to me by KIRILL Serrano in this document on Mar 28, 2023 for AMRIK ROBLERO .\par

## 2023-03-28 NOTE — REASON FOR VISIT
[Follow-up] : a follow-up of an existing diagnosis [[employer] ] : provided by [employer]   [Time Spent: ____ minutes] : Total time spent using  services: [unfilled] minutes. The patient's primary language is not English thus required  services. [FreeTextEntry1] : fatty liver, acid reflux  [Interpreters_IDNumber] : [id number] [Interpreters_FullName] : Za [TWNoteComboBox1] : Armenian

## 2023-03-28 NOTE — PHYSICAL EXAM
[Alert] : alert [Normal Voice/Communication] : normal voice/communication [Healthy Appearing] : healthy appearing [No Acute Distress] : no acute distress [Sclera] : the sclera and conjunctiva were normal [Hearing Threshold Finger Rub Not Simpson] : hearing was normal [Normal Lips/Gums] : the lips and gums were normal [Oropharynx] : the oropharynx was normal [Normal Appearance] : the appearance of the neck was normal [No Neck Mass] : no neck mass was observed [No Respiratory Distress] : no respiratory distress [No Acc Muscle Use] : no accessory muscle use [Respiration, Rhythm And Depth] : normal respiratory rhythm and effort [Auscultation Breath Sounds / Voice Sounds] : lungs were clear to auscultation bilaterally [Heart Rate And Rhythm] : heart rate was normal and rhythm regular [Normal S1, S2] : normal S1 and S2 [Murmurs] : no murmurs [Bowel Sounds] : normal bowel sounds [No Masses] : no abdominal mass palpated [Abdomen Soft] : soft [] : no hepatosplenomegaly [Epigastric] : in the epigastric area [RUQ] : in the right upper quadrant [Oriented To Time, Place, And Person] : oriented to person, place, and time [RLQ] : not in the right lower quadrant [LUQ] : not in the left upper quadrant [LLQ] : not in the left lower quadrant

## 2023-04-06 ENCOUNTER — APPOINTMENT (OUTPATIENT)
Dept: GASTROENTEROLOGY | Facility: CLINIC | Age: 68
End: 2023-04-06
Payer: MEDICARE

## 2023-04-06 DIAGNOSIS — K76.0 FATTY (CHANGE OF) LIVER, NOT ELSEWHERE CLASSIFIED: ICD-10-CM

## 2023-04-06 PROCEDURE — 91200 LIVER ELASTOGRAPHY: CPT

## 2023-04-06 NOTE — ASSESSMENT
[FreeTextEntry1] : FibroScan/ Vibration Controlled Transient Elastography (VCTE) Report\par \par PROBE SIZE: M\par \par INDICATION: NAFLD/HEMPHILL\par \par REFERRING PHYSICIAN: Dr. Danitza Sylvester\par \par  \par PROCEDURE:\par \par Patient was NPO for 4 hours prior to procedure. After providing oral explanations of the procedure to the patient, patient was placed in supine position with right arm in maximum abduction to allow optimal exposure of right lateral abdomen. Patient abdomen was briefly assessed to identify to the terminus of the xyphoid process. The ideal target testing spot was located mid-line and lateral to this point. To acquire proper signals, the skin to liver capsule distance was accessed with the FibroScan probe. Patient was instructed to breathe normally and to abstain from sudden movements during the procedure. Ten shear waves were produced and measurements of the speed of each wave evaluated. Patient tolerated the procedure well and was discharged without incident.\par \par  \par FINDINGS:\par \par - Median shear wave speed of 1.23 meters/second.\par \par - This corresponds to a median Liver Stiffness Score of 4.6 kPa.\par \par - IQR/med 8%\par \par -  dB/m\par  \par RESULTS:\par \par FIBROSIS: F0/F1 Fibrosis\par \par STEATOSIS: S0 - Stage (0-10% steatosis) \par \par The recommendation regarding fibrosis stage and/ or steatosis grade is based on current published scientific literature and the provided patient’s diagnosis. Any further medical or surgical intervention should be made by considering the overall medical condition of the patient.

## 2023-05-11 ENCOUNTER — APPOINTMENT (OUTPATIENT)
Dept: OBGYN | Facility: CLINIC | Age: 68
End: 2023-05-11
Payer: MEDICARE

## 2023-05-11 ENCOUNTER — NON-APPOINTMENT (OUTPATIENT)
Age: 68
End: 2023-05-11

## 2023-05-11 VITALS
WEIGHT: 145 LBS | HEIGHT: 63 IN | DIASTOLIC BLOOD PRESSURE: 80 MMHG | SYSTOLIC BLOOD PRESSURE: 122 MMHG | BODY MASS INDEX: 25.69 KG/M2

## 2023-05-11 DIAGNOSIS — Z87.898 PERSONAL HISTORY OF OTHER SPECIFIED CONDITIONS: ICD-10-CM

## 2023-05-11 DIAGNOSIS — R82.90 UNSPECIFIED ABNORMAL FINDINGS IN URINE: ICD-10-CM

## 2023-05-11 DIAGNOSIS — Z87.42 PERSONAL HISTORY OF OTHER DISEASES OF THE FEMALE GENITAL TRACT: ICD-10-CM

## 2023-05-11 DIAGNOSIS — Z63.4 DISAPPEARANCE AND DEATH OF FAMILY MEMBER: ICD-10-CM

## 2023-05-11 DIAGNOSIS — Z12.31 ENCOUNTER FOR SCREENING MAMMOGRAM FOR MALIGNANT NEOPLASM OF BREAST: ICD-10-CM

## 2023-05-11 PROCEDURE — 99397 PER PM REEVAL EST PAT 65+ YR: CPT

## 2023-05-11 SDOH — SOCIAL STABILITY - SOCIAL INSECURITY: DISSAPEARANCE AND DEATH OF FAMILY MEMBER: Z63.4

## 2023-05-13 PROBLEM — Z63.4 WIDOWED: Status: ACTIVE | Noted: 2023-05-11

## 2023-05-13 LAB — HPV HIGH+LOW RISK DNA PNL CVX: NOT DETECTED

## 2023-05-13 NOTE — END OF VISIT
[FreeTextEntry3] : I, Lux Doan solely acted as scribe for Dr. Elizabeth Parnell on 05/11/2023 \par All medical entries made by the Scribe were at my, Dr. Parnell’s, direction and personally\par dictated by me on 05/11/2023 . I have reviewed the chart and agree that the record\par accurately reflects my personal performance of the history, physical exam, assessment and plan. I\par have also personally directed, reviewed, and agreed with the chart.

## 2023-05-13 NOTE — HISTORY OF PRESENT ILLNESS
[N] : Patient is not sexually active [Y] : Positive pregnancy history [Menarche Age: ____] : age at menarche was [unfilled] [Previously active] : previously active [FreeTextEntry1] : AMRIK 67 yo  LMP unknown is here today for an annual exam. No gynecological complaints. \par \par Last mammogram on 2021 revealed BR2. \par \par Last pap smear on 22 was normal, HPV negative. \par \par Last colonoscopy was in  as per patient.  [Mammogramdate] : 08/13/2021 [TextBox_19] : BR2 [PapSmeardate] : 04/11/2022 [TextBox_31] : NEG [ColonoscopyDate] : 2021 [TextBox_43] : O [HPVDate] : 04/11/2022 [TextBox_78] : NEG [PGxTotal] : 10 [[facility] HospitalxFulerm] : 9 [PGHxAbortions] : 1 [[facility] CenterxLiving] : 6

## 2023-05-17 LAB — CYTOLOGY CVX/VAG DOC THIN PREP: ABNORMAL

## 2023-06-28 ENCOUNTER — OFFICE (OUTPATIENT)
Dept: URBAN - METROPOLITAN AREA CLINIC 116 | Facility: CLINIC | Age: 68
Setting detail: OPHTHALMOLOGY
End: 2023-06-28
Payer: COMMERCIAL

## 2023-06-28 DIAGNOSIS — H52.4: ICD-10-CM

## 2023-06-28 DIAGNOSIS — E11.9: ICD-10-CM

## 2023-06-28 DIAGNOSIS — H25.13: ICD-10-CM

## 2023-06-28 PROCEDURE — 92014 COMPRE OPH EXAM EST PT 1/>: CPT | Performed by: OPTOMETRIST

## 2023-06-28 PROCEDURE — 92250 FUNDUS PHOTOGRAPHY W/I&R: CPT | Performed by: OPTOMETRIST

## 2023-06-28 ASSESSMENT — REFRACTION_MANIFEST
OS_ADD: +2.50
OD_CYLINDER: -0.50
OD_ADD: +2.50
OS_ADD: +2.50
OD_AXIS: 065
OD_VA1: 20/30
OS_CYLINDER: -0.50
OD_VA1: 20/30-
OD_CYLINDER: -0.50
OU_VA: 20/30
OS_VA1: 20/30
OS_SPHERE: +1.25
OD_VA1: 20/30
OS_ADD: +2.50
OS_SPHERE: +1.75
OD_AXIS: 050
OS_SPHERE: +1.25
OD_SPHERE: +1.25
OS_VA1: 20/30
OD_SPHERE: +1.25
OD_AXIS: 050
OS_CYLINDER: -0.25
OD_CYLINDER: -0.50
OD_ADD: +2.50
OD_ADD: +2.50
OS_VA1: 20/40+1
OD_SPHERE: +1.75
OS_AXIS: 115
OS_AXIS: 120
OS_CYLINDER: -0.50
OS_AXIS: 120

## 2023-06-28 ASSESSMENT — SPHEQUIV_DERIVED
OS_SPHEQUIV: 1.5
OS_SPHEQUIV: 1
OD_SPHEQUIV: 1.625
OD_SPHEQUIV: 1.5
OD_SPHEQUIV: 1
OS_SPHEQUIV: 1.625
OS_SPHEQUIV: 1
OD_SPHEQUIV: 1

## 2023-06-28 ASSESSMENT — REFRACTION_CURRENTRX
OS_OVR_VA: 20/
OS_CYLINDER: -0.50
OS_AXIS: 120
OD_VPRISM_DIRECTION: BF
OS_CYLINDER: -0.50
OS_ADD: +2.50
OS_SPHERE: +1.75
OD_ADD: +2.75
OD_AXIS: 060
OD_CYLINDER: -0.75
OD_VPRISM_DIRECTION: BF
OS_AXIS: 120
OD_AXIS: 050
OS_ADD: +2.50
OD_OVR_VA: 20/
OS_VPRISM_DIRECTION: BF
OD_OVR_VA: 20/
OS_SPHERE: +1.25
OS_OVR_VA: 20/
OD_CYLINDER: -0.50
OD_SPHERE: +1.25
OD_ADD: +2.50
OD_SPHERE: +2.00

## 2023-06-28 ASSESSMENT — VISUAL ACUITY
OD_BCVA: 20/40
OS_BCVA: 20/40

## 2023-06-28 ASSESSMENT — REFRACTION_AUTOREFRACTION
OD_AXIS: 090
OD_CYLINDER: -0.75
OS_SPHERE: +2.00
OS_AXIS: 095
OD_SPHERE: +2.00
OS_CYLINDER: -1.00

## 2023-06-28 ASSESSMENT — TONOMETRY
OD_IOP_MMHG: 14
OS_IOP_MMHG: 15

## 2023-06-28 ASSESSMENT — CONFRONTATIONAL VISUAL FIELD TEST (CVF)
OS_FINDINGS: FULL
OD_FINDINGS: FULL

## 2023-06-28 ASSESSMENT — KERATOMETRY
OD_K1POWER_DIOPTERS: UTP
OS_K1POWER_DIOPTERS: UTP

## 2023-06-29 ENCOUNTER — APPOINTMENT (OUTPATIENT)
Dept: GASTROENTEROLOGY | Facility: CLINIC | Age: 68
End: 2023-06-29

## 2023-08-01 ENCOUNTER — OFFICE (OUTPATIENT)
Dept: URBAN - METROPOLITAN AREA CLINIC 94 | Facility: CLINIC | Age: 68
Setting detail: OPHTHALMOLOGY
End: 2023-08-01
Payer: MEDICARE

## 2023-08-01 DIAGNOSIS — H25.13: ICD-10-CM

## 2023-08-01 DIAGNOSIS — E11.9: ICD-10-CM

## 2023-08-01 DIAGNOSIS — H25.12: ICD-10-CM

## 2023-08-01 PROBLEM — H25.11 CATARACT SENILE NUCLEAR SCLEROSIS; RIGHT EYE, LEFT EYE, BOTH EYES: Status: ACTIVE | Noted: 2023-08-01

## 2023-08-01 PROCEDURE — 92136 OPHTHALMIC BIOMETRY: CPT | Performed by: OPHTHALMOLOGY

## 2023-08-01 PROCEDURE — 99214 OFFICE O/P EST MOD 30 MIN: CPT | Performed by: OPHTHALMOLOGY

## 2023-08-01 ASSESSMENT — REFRACTION_MANIFEST
OD_VA1: 20/30
OS_VA1: 20/40
OS_SPHERE: +1.75
OD_SPHERE: +2.00
OD_VA1: 20/40
OD_VA1: 20/30
OD_AXIS: 065
OS_VA1: 20/30
OD_AXIS: 087
OD_ADD: +2.50
OS_AXIS: 120
OS_VA1: 20/30
OS_AXIS: 115
OU_VA: 20/30
OS_ADD: +2.50
OD_ADD: +2.50
OD_ADD: +2.50
OS_SPHERE: +1.25
OS_CYLINDER: -0.50
OS_VA1: 20/40+1
OS_ADD: +2.50
OS_CYLINDER: -0.25
OD_CYLINDER: -0.50
OS_SPHERE: +2.25
OD_VA1: 20/30-
OS_SPHERE: +1.25
OS_ADD: +2.50
OD_CYLINDER: -0.50
OD_SPHERE: +1.75
OS_AXIS: 106
OD_SPHERE: +1.25
OS_AXIS: 120
OD_SPHERE: +1.25
OD_AXIS: 050
OD_AXIS: 050
OD_CYLINDER: -0.50
OS_CYLINDER: -0.50
OD_CYLINDER: -0.50
OS_CYLINDER: -0.75

## 2023-08-01 ASSESSMENT — TONOMETRY
OS_IOP_MMHG: 14
OD_IOP_MMHG: 14

## 2023-08-01 ASSESSMENT — REFRACTION_CURRENTRX
OS_SPHERE: +1.75
OS_ADD: +2.50
OS_ADD: +2.50
OD_ADD: +2.50
OS_AXIS: 120
OS_CYLINDER: -0.50
OS_VPRISM_DIRECTION: BF
OD_CYLINDER: -0.50
OS_OVR_VA: 20/
OS_AXIS: 120
OD_CYLINDER: -0.50
OD_VPRISM_DIRECTION: BF
OS_CYLINDER: -0.50
OS_ADD: +2.50
OS_OVR_VA: 20/
OS_AXIS: 122
OD_ADD: +2.75
OS_SPHERE: +1.25
OD_SPHERE: +1.25
OD_SPHERE: +1.25
OD_SPHERE: +2.00
OS_SPHERE: +1.25
OD_OVR_VA: 20/
OD_OVR_VA: 20/
OD_ADD: +2.50
OS_OVR_VA: 20/
OD_AXIS: 054
OD_OVR_VA: 20/
OD_VPRISM_DIRECTION: BF
OS_CYLINDER: -0.50
OD_AXIS: 060
OD_AXIS: 050
OD_CYLINDER: -0.75

## 2023-08-01 ASSESSMENT — SPHEQUIV_DERIVED
OD_SPHEQUIV: 1
OD_SPHEQUIV: 1.75
OS_SPHEQUIV: 1
OD_SPHEQUIV: 1.5
OS_SPHEQUIV: 1.875
OD_SPHEQUIV: 1.75
OD_SPHEQUIV: 1
OS_SPHEQUIV: 1.625
OS_SPHEQUIV: 1
OS_SPHEQUIV: 1.875

## 2023-08-01 ASSESSMENT — AXIALLENGTH_DERIVED
OD_AL: 21.8688
OS_AL: 21.936
OD_AL: 21.8688
OS_AL: 22.1485
OD_AL: 21.6206
OD_AL: 21.7027
OS_AL: 21.8521
OS_AL: 21.8521
OD_AL: 21.6206
OS_AL: 22.1485

## 2023-08-01 ASSESSMENT — KERATOMETRY
OS_K1POWER_DIOPTERS: 46.25
OD_AXISANGLE2_DEGREES: 164
OS_K2POWER_DIOPTERS: 47.00
OD_K2POWER_DIOPTERS: 47.75
OS_AXISANGLE_DEGREES: 011
OD_AXISANGLE_DEGREES: 164
OD_CYLPOWER_DEGREES: 0.5
OD_AXISANGLE_DEGREES: 74
OS_K2POWER_DIOPTERS: 47.00
OS_K1K2_AVERAGE: 46.625
OS_AXISANGLE_DEGREES: 101
OD_K2POWER_DIOPTERS: 47.75
OD_K1POWER_DIOPTERS: 47.25
OD_K1POWER_DIOPTERS: 47.25
OD_K1K2_AVERAGE: 47.5
OS_CYLAXISANGLE_DEGREES: 011
OS_AXISANGLE2_DEGREES: 011
OS_CYLPOWER_DEGREES: 0.75
OD_CYLAXISANGLE_DEGREES: 164
OS_K1POWER_DIOPTERS: 46.25

## 2023-08-01 ASSESSMENT — REFRACTION_AUTOREFRACTION
OS_AXIS: 106
OD_CYLINDER: -0.50
OD_AXIS: 087
OS_SPHERE: +2.25
OD_SPHERE: +2.00
OS_CYLINDER: -0.75

## 2023-08-01 ASSESSMENT — VISUAL ACUITY
OS_BCVA: 20/40-1
OD_BCVA: 20/40

## 2023-08-01 ASSESSMENT — CONFRONTATIONAL VISUAL FIELD TEST (CVF)
OD_FINDINGS: FULL
OS_FINDINGS: FULL

## 2023-10-10 NOTE — DISCHARGE NOTE NURSING/CASE MANAGEMENT/SOCIAL WORK - NSDCPEFALRISK_GEN_ALL_CORE
For information on Fall & Injury Prevention, visit: [email or web address] OR  [email or web address] OR  https://www.cdc.gov/steadi/patient.html
Confused/Lethargic

## 2023-12-02 NOTE — ED STATDOCS - OBJECTIVE STATEMENT
appears normal and intact 65 y/o F pt with hx of HTN, HLD and DM I presents to ED c/o fever, chills, diffuse myalgia, sore throat and nasal congestion x 3 days. Was given Tylenol at 12:00 today. No cough, N/V/D.  No further complaints at this time.

## 2023-12-14 NOTE — DISCHARGE NOTE ADULT - PLAN OF CARE
resolution finish course of antibiotics  follow up with PMD in 1 week see above General uncontrolled as hga1c 11  continue insulin therapy as prescribed.    frequent glucose monitoring  follow up with endocrinology continue home medications

## 2024-05-13 ENCOUNTER — APPOINTMENT (OUTPATIENT)
Dept: OBGYN | Facility: CLINIC | Age: 69
End: 2024-05-13
Payer: MEDICARE

## 2024-05-13 VITALS
BODY MASS INDEX: 25.16 KG/M2 | WEIGHT: 142 LBS | HEIGHT: 63 IN | DIASTOLIC BLOOD PRESSURE: 78 MMHG | SYSTOLIC BLOOD PRESSURE: 126 MMHG

## 2024-05-13 DIAGNOSIS — Z87.448 PERSONAL HISTORY OF OTHER DISEASES OF URINARY SYSTEM: ICD-10-CM

## 2024-05-13 DIAGNOSIS — N89.8 OTHER SPECIFIED NONINFLAMMATORY DISORDERS OF VAGINA: ICD-10-CM

## 2024-05-13 DIAGNOSIS — R30.0 DYSURIA: ICD-10-CM

## 2024-05-13 DIAGNOSIS — Z87.898 PERSONAL HISTORY OF OTHER SPECIFIED CONDITIONS: ICD-10-CM

## 2024-05-13 DIAGNOSIS — R10.11 RIGHT UPPER QUADRANT PAIN: ICD-10-CM

## 2024-05-13 DIAGNOSIS — Z12.31 ENCOUNTER FOR SCREENING MAMMOGRAM FOR MALIGNANT NEOPLASM OF BREAST: ICD-10-CM

## 2024-05-13 DIAGNOSIS — Z01.419 ENCOUNTER FOR GYNECOLOGICAL EXAMINATION (GENERAL) (ROUTINE) W/OUT ABNORMAL FINDINGS: ICD-10-CM

## 2024-05-13 DIAGNOSIS — R39.15 URGENCY OF URINATION: ICD-10-CM

## 2024-05-13 DIAGNOSIS — R35.1 NOCTURIA: ICD-10-CM

## 2024-05-13 DIAGNOSIS — N95.2 POSTMENOPAUSAL ATROPHIC VAGINITIS: ICD-10-CM

## 2024-05-13 DIAGNOSIS — L29.2 PRURITUS VULVAE: ICD-10-CM

## 2024-05-13 PROCEDURE — G0101: CPT

## 2024-05-13 RX ORDER — CLOTRIMAZOLE AND BETAMETHASONE DIPROPIONATE 10; .5 MG/G; MG/G
1-0.05 CREAM TOPICAL TWICE DAILY
Qty: 1 | Refills: 3 | Status: ACTIVE | COMMUNITY
Start: 2024-05-13 | End: 1900-01-01

## 2024-05-13 RX ORDER — ESTRADIOL 10 UG/1
10 TABLET VAGINAL
Qty: 54 | Refills: 3 | Status: ACTIVE | COMMUNITY
Start: 2024-05-13 | End: 1900-01-01

## 2024-05-13 RX ORDER — ROSUVASTATIN CALCIUM 5 MG/1
5 TABLET, FILM COATED ORAL
Qty: 90 | Refills: 0 | Status: DISCONTINUED | COMMUNITY
Start: 2022-08-23 | End: 2024-05-13

## 2024-05-13 RX ORDER — DEXTRAN 70 AND HYPROMELLOSE 2910 1; 3 MG/ML; MG/ML
0.1-0.3 SOLUTION/ DROPS OPHTHALMIC
Qty: 15 | Refills: 0 | Status: DISCONTINUED | COMMUNITY
Start: 2022-06-27 | End: 2024-05-13

## 2024-05-13 RX ORDER — LACTULOSE 10 G/15ML
20 SOLUTION ORAL
Qty: 1800 | Refills: 3 | Status: DISCONTINUED | COMMUNITY
Start: 2023-03-28 | End: 2024-05-13

## 2024-05-13 RX ORDER — MULTIVIT-MIN/FOLIC/VIT K/LYCOP 400-300MCG
25 MCG TABLET ORAL
Qty: 30 | Refills: 0 | Status: DISCONTINUED | COMMUNITY
Start: 2022-05-13 | End: 2024-05-13

## 2024-05-13 RX ORDER — LANCETS 33 GAUGE
EACH MISCELLANEOUS
Qty: 100 | Refills: 0 | Status: DISCONTINUED | COMMUNITY
Start: 2022-09-23 | End: 2024-05-13

## 2024-05-13 NOTE — HISTORY OF PRESENT ILLNESS
[HPV test offered] : HPV test offered [postmenopausal] : postmenopausal [N] : Patient is not sexually active [Y] : Positive pregnancy history [Menopause Age: ____] : age at menopause was [unfilled] [No] : Patient does not have concerns regarding sex [Previously active] : previously active [Mammogramdate] : 12/12/2023 [TextBox_19] : BR2 [PapSmeardate] : 5/11/23 [TextBox_31] : NEG [ColonoscopyDate] : 03/08/22 [HPVDate] : 5/11/23 [TextBox_78] : NEG [LMPDate] : 01/2003 [PGxTotal] : 10 [[facility] HospitalxFulerm] : 9 [PGHxAbortions] : 1 [[facility] MaryvalexLiving] : 6 [FreeTextEntry1] : 01/2003

## 2024-05-13 NOTE — PLAN
[FreeTextEntry1] : Pap not done. Will do next year.  Rx given for mammo.  Will check urine cx to R/O UTI. However symptoms may also be secondary to severe vaginal atrophy. Rx sent for yuvafem.  Rx also sent for lotrione in the event she developed itching again.

## 2024-05-13 NOTE — PHYSICAL EXAM
[Chaperone Present] : A chaperone was present in the examining room during all aspects of the physical examination [[id number]] : A chaperone was present during the pelvic exam. [Appropriately responsive] : appropriately responsive [Alert] : alert [No Acute Distress] : no acute distress [Soft] : soft [Non-tender] : non-tender [Non-distended] : non-distended [Oriented x3] : oriented x3 [Examination Of The Breasts] : a normal appearance [No Masses] : no breast masses were palpable [Labia Majora] : normal [Labia Minora] : normal [Atrophy] : atrophy [Cystocele] : a cystocele [Dry Mucosa] : dry mucosa [Normal] : normal [Uterine Adnexae] : non-palpable

## 2024-05-13 NOTE — REVIEW OF SYSTEMS
[Urgency] : urgency [Incontinence] : incontinence [Dysuria] : dysuria [Genital Rash/Irritation] : genital rash/irritation

## 2024-05-15 LAB — BACTERIA UR CULT: NORMAL

## 2024-09-30 ENCOUNTER — EMERGENCY (EMERGENCY)
Facility: HOSPITAL | Age: 69
LOS: 1 days | Discharge: HOME | End: 2024-09-30
Attending: EMERGENCY MEDICINE
Payer: MEDICARE

## 2024-09-30 VITALS
HEART RATE: 77 BPM | TEMPERATURE: 98 F | DIASTOLIC BLOOD PRESSURE: 81 MMHG | SYSTOLIC BLOOD PRESSURE: 153 MMHG | RESPIRATION RATE: 18 BRPM | OXYGEN SATURATION: 96 %

## 2024-09-30 VITALS
SYSTOLIC BLOOD PRESSURE: 165 MMHG | WEIGHT: 145.51 LBS | DIASTOLIC BLOOD PRESSURE: 77 MMHG | TEMPERATURE: 98 F | RESPIRATION RATE: 20 BRPM | HEART RATE: 82 BPM | OXYGEN SATURATION: 98 % | HEIGHT: 63 IN

## 2024-09-30 DIAGNOSIS — Z98.51 TUBAL LIGATION STATUS: Chronic | ICD-10-CM

## 2024-09-30 DIAGNOSIS — Z98.891 HISTORY OF UTERINE SCAR FROM PREVIOUS SURGERY: Chronic | ICD-10-CM

## 2024-09-30 LAB
ALBUMIN SERPL ELPH-MCNC: 4.2 G/DL — SIGNIFICANT CHANGE UP (ref 3.3–5.2)
ALP SERPL-CCNC: 82 U/L — SIGNIFICANT CHANGE UP (ref 40–120)
ALT FLD-CCNC: 11 U/L — SIGNIFICANT CHANGE UP
ANION GAP SERPL CALC-SCNC: 11 MMOL/L — SIGNIFICANT CHANGE UP (ref 5–17)
APPEARANCE UR: CLEAR — SIGNIFICANT CHANGE UP
AST SERPL-CCNC: 17 U/L — SIGNIFICANT CHANGE UP
BASOPHILS # BLD AUTO: 0.05 K/UL — SIGNIFICANT CHANGE UP (ref 0–0.2)
BASOPHILS NFR BLD AUTO: 0.7 % — SIGNIFICANT CHANGE UP (ref 0–2)
BILIRUB SERPL-MCNC: 0.2 MG/DL — LOW (ref 0.4–2)
BILIRUB UR-MCNC: NEGATIVE — SIGNIFICANT CHANGE UP
BUN SERPL-MCNC: 11 MG/DL — SIGNIFICANT CHANGE UP (ref 8–20)
CALCIUM SERPL-MCNC: 8.8 MG/DL — SIGNIFICANT CHANGE UP (ref 8.4–10.5)
CHLORIDE SERPL-SCNC: 99 MMOL/L — SIGNIFICANT CHANGE UP (ref 96–108)
CO2 SERPL-SCNC: 27 MMOL/L — SIGNIFICANT CHANGE UP (ref 22–29)
COLOR SPEC: YELLOW — SIGNIFICANT CHANGE UP
CREAT SERPL-MCNC: 0.71 MG/DL — SIGNIFICANT CHANGE UP (ref 0.5–1.3)
DIFF PNL FLD: NEGATIVE — SIGNIFICANT CHANGE UP
EGFR: 93 ML/MIN/1.73M2 — SIGNIFICANT CHANGE UP
EOSINOPHIL # BLD AUTO: 1.08 K/UL — HIGH (ref 0–0.5)
EOSINOPHIL NFR BLD AUTO: 15.5 % — HIGH (ref 0–6)
GLUCOSE SERPL-MCNC: 204 MG/DL — HIGH (ref 70–99)
GLUCOSE UR QL: >=1000 MG/DL
HCT VFR BLD CALC: 36.7 % — SIGNIFICANT CHANGE UP (ref 34.5–45)
HGB BLD-MCNC: 12.7 G/DL — SIGNIFICANT CHANGE UP (ref 11.5–15.5)
IMM GRANULOCYTES NFR BLD AUTO: 0.3 % — SIGNIFICANT CHANGE UP (ref 0–0.9)
KETONES UR-MCNC: ABNORMAL MG/DL
LACTATE BLDV-MCNC: 1 MMOL/L — SIGNIFICANT CHANGE UP (ref 0.5–2)
LEUKOCYTE ESTERASE UR-ACNC: NEGATIVE — SIGNIFICANT CHANGE UP
LYMPHOCYTES # BLD AUTO: 1.92 K/UL — SIGNIFICANT CHANGE UP (ref 1–3.3)
LYMPHOCYTES # BLD AUTO: 27.5 % — SIGNIFICANT CHANGE UP (ref 13–44)
MCHC RBC-ENTMCNC: 29.4 PG — SIGNIFICANT CHANGE UP (ref 27–34)
MCHC RBC-ENTMCNC: 34.6 GM/DL — SIGNIFICANT CHANGE UP (ref 32–36)
MCV RBC AUTO: 85 FL — SIGNIFICANT CHANGE UP (ref 80–100)
MONOCYTES # BLD AUTO: 0.49 K/UL — SIGNIFICANT CHANGE UP (ref 0–0.9)
MONOCYTES NFR BLD AUTO: 7 % — SIGNIFICANT CHANGE UP (ref 2–14)
NEUTROPHILS # BLD AUTO: 3.41 K/UL — SIGNIFICANT CHANGE UP (ref 1.8–7.4)
NEUTROPHILS NFR BLD AUTO: 49 % — SIGNIFICANT CHANGE UP (ref 43–77)
NITRITE UR-MCNC: NEGATIVE — SIGNIFICANT CHANGE UP
PH UR: 7.5 — SIGNIFICANT CHANGE UP (ref 5–8)
PLATELET # BLD AUTO: 169 K/UL — SIGNIFICANT CHANGE UP (ref 150–400)
POTASSIUM SERPL-MCNC: 4.1 MMOL/L — SIGNIFICANT CHANGE UP (ref 3.5–5.3)
POTASSIUM SERPL-SCNC: 4.1 MMOL/L — SIGNIFICANT CHANGE UP (ref 3.5–5.3)
PROT SERPL-MCNC: 7.1 G/DL — SIGNIFICANT CHANGE UP (ref 6.6–8.7)
PROT UR-MCNC: NEGATIVE MG/DL — SIGNIFICANT CHANGE UP
RBC # BLD: 4.32 M/UL — SIGNIFICANT CHANGE UP (ref 3.8–5.2)
RBC # FLD: 12.9 % — SIGNIFICANT CHANGE UP (ref 10.3–14.5)
SODIUM SERPL-SCNC: 137 MMOL/L — SIGNIFICANT CHANGE UP (ref 135–145)
SP GR SPEC: 1.01 — SIGNIFICANT CHANGE UP (ref 1–1.03)
UROBILINOGEN FLD QL: 1 MG/DL — SIGNIFICANT CHANGE UP (ref 0.2–1)
WBC # BLD: 6.97 K/UL — SIGNIFICANT CHANGE UP (ref 3.8–10.5)
WBC # FLD AUTO: 6.97 K/UL — SIGNIFICANT CHANGE UP (ref 3.8–10.5)

## 2024-09-30 PROCEDURE — T1013: CPT

## 2024-09-30 PROCEDURE — 96360 HYDRATION IV INFUSION INIT: CPT | Mod: XU

## 2024-09-30 PROCEDURE — 83605 ASSAY OF LACTIC ACID: CPT

## 2024-09-30 PROCEDURE — 74177 CT ABD & PELVIS W/CONTRAST: CPT | Mod: 26,MC

## 2024-09-30 PROCEDURE — 99285 EMERGENCY DEPT VISIT HI MDM: CPT

## 2024-09-30 PROCEDURE — 74177 CT ABD & PELVIS W/CONTRAST: CPT | Mod: MC

## 2024-09-30 PROCEDURE — 85025 COMPLETE CBC W/AUTO DIFF WBC: CPT

## 2024-09-30 PROCEDURE — 81003 URINALYSIS AUTO W/O SCOPE: CPT

## 2024-09-30 PROCEDURE — 87086 URINE CULTURE/COLONY COUNT: CPT

## 2024-09-30 PROCEDURE — 80053 COMPREHEN METABOLIC PANEL: CPT

## 2024-09-30 PROCEDURE — 36415 COLL VENOUS BLD VENIPUNCTURE: CPT

## 2024-09-30 PROCEDURE — 99284 EMERGENCY DEPT VISIT MOD MDM: CPT | Mod: 25

## 2024-09-30 RX ORDER — CEPHALEXIN 500 MG
1 CAPSULE ORAL
Qty: 14 | Refills: 0
Start: 2024-09-30 | End: 2024-10-06

## 2024-09-30 RX ORDER — SODIUM CHLORIDE 0.9 % (FLUSH) 0.9 %
1000 SYRINGE (ML) INJECTION ONCE
Refills: 0 | Status: COMPLETED | OUTPATIENT
Start: 2024-09-30 | End: 2024-09-30

## 2024-09-30 RX ADMIN — Medication 1000 MILLILITER(S): at 15:53

## 2024-09-30 RX ADMIN — Medication 1000 MILLILITER(S): at 14:53

## 2024-09-30 NOTE — ED STATDOCS - OBJECTIVE STATEMENT
Lower abdominal pain for the past 2 weeks, coming and going with associated burning with urination and bilateral lower back pain/flank pain.  Denies nausea or vomiting.  Denies fever but reports occasional chills.  Saw PMD on  and was prescribed Macrobid and Pyridium.  Reports no change in symptoms since starting medication.  Denies prior abdominal problems.  History of prior .  History of diabetes on glipizide, Ozempic and Basaglar.

## 2024-09-30 NOTE — ED ADULT TRIAGE NOTE - CHIEF COMPLAINT QUOTE
Pt has been treated for UT with Microbid prescribed on 9/25 without much relief- still having UTI symptoms

## 2024-09-30 NOTE — ED ADULT NURSE NOTE - OBJECTIVE STATEMENT
Assumed care of pt at 1450. Pt is A&Ox4. Respirations are even and unlabored. Pt present to the ED for urinary complaints. Pt states she was diagnosed with a UTI last Thursday been on antibiotic Mircrobid . Pt states still having suprapubic pain and urinary frequency. Denies chest pain, SOB, and N/V/D. ED provider evaluating, plan of care ongoing.   ED  Gamaliel.

## 2024-09-30 NOTE — ED ADULT NURSE NOTE - NSFALLUNIVINTERV_ED_ALL_ED
Bed/Stretcher in lowest position, wheels locked, appropriate side rails in place/Call bell, personal items and telephone in reach/Instruct patient to call for assistance before getting out of bed/chair/stretcher/Non-slip footwear applied when patient is off stretcher/[place] to call system/Physically safe environment - no spills, clutter or unnecessary equipment/Purposeful proactive rounding/Room/bathroom lighting operational, light cord in reach

## 2024-09-30 NOTE — ED STATDOCS - ATTENDING APP SHARED VISIT CONTRIBUTION OF CARE
I, Khadar Moya, performed the initial face to face bedside interview with this patient regarding history of present illness, review of symptoms and relevant past medical, social and family history.  I completed an independent physical examination.  I was the provider who initially evaluated this patient.  Follow-up on ordered tests (ie labs, radiologic studies) and re-evaluation of the patient's status has been communicated to the ACP.  Disposition of the patient will be based on test outcome and response to ED interventions.

## 2024-09-30 NOTE — ED STATDOCS - PATIENT PORTAL LINK FT
You can access the FollowMyHealth Patient Portal offered by [facility] by registering at the following website: [email or web address]. By joining [employer]’s FollowMyHealth portal, you will also be able to view your health information using other applications (apps) compatible with our system.

## 2024-09-30 NOTE — ED STATDOCS - CLINICAL SUMMARY MEDICAL DECISION MAKING FREE TEXT BOX
lower abdominal pain coming and going for the past 2 weeks with associated dysuria and bilateral lower back pain/flank pain.  Symptoms are unchanged with taking Macrobid and Pyridium since September 25.  Bilateral lower quadrant tenderness on examination without rebound rigidity or guarding.  Likely urinary tract infection, possible diverticulitis or appendicitis.  Labs and CT lower abdominal pain coming and going for the past 2 weeks with associated dysuria and bilateral lower back pain/flank pain.  Symptoms are unchanged with taking Macrobid and Pyridium since September 25.  Bilateral lower quadrant tenderness on examination without rebound rigidity or guarding.  Likely urinary tract infection, possible diverticulitis or appendicitis.  Labs and CT    Patient resting comfortably in no acute distress at this time.  Labs reviewed.  UA reviewed.  CT scan shows moderate amount of constipation otherwise no acute pathology.  Will DC home with Keflex and MiraLAX.  PCP follow-up.  Return precaution discussed with patient.

## 2024-09-30 NOTE — ED ADULT NURSE REASSESSMENT NOTE - NS ED NURSE REASSESS COMMENT FT1
Pt is resting comfortably in stretcher. NAD. Pt is A&Ox4. Respirations are even and unlabored. Pt awaiting CT scan. Plan on going.

## 2024-09-30 NOTE — ED STATDOCS - PHYSICAL EXAMINATION
PE: nontoxic appearing, NAD, moist mms, no scleral icterus, abd soft/ nondistended and dull to percussion, nonlocalized lower abd tenderness, no R/R/G, no cvat, no jaundice.

## 2024-10-02 LAB
CULTURE RESULTS: SIGNIFICANT CHANGE UP
SPECIMEN SOURCE: SIGNIFICANT CHANGE UP

## 2024-10-17 ENCOUNTER — APPOINTMENT (OUTPATIENT)
Dept: OBGYN | Facility: CLINIC | Age: 69
End: 2024-10-17
Payer: MEDICARE

## 2024-10-17 VITALS
BODY MASS INDEX: 25.87 KG/M2 | WEIGHT: 146 LBS | SYSTOLIC BLOOD PRESSURE: 130 MMHG | DIASTOLIC BLOOD PRESSURE: 80 MMHG | HEIGHT: 63 IN

## 2024-10-17 DIAGNOSIS — R10.2 PELVIC AND PERINEAL PAIN: ICD-10-CM

## 2024-10-17 DIAGNOSIS — R39.15 URGENCY OF URINATION: ICD-10-CM

## 2024-10-17 LAB
APPEARANCE: CLEAR
BILIRUBIN URINE: NEGATIVE
BLOOD URINE: NEGATIVE
COLOR: YELLOW
GLUCOSE QUALITATIVE U: NEGATIVE
KETONES URINE: NEGATIVE
LEUKOCYTE ESTERASE URINE: ABNORMAL
NITRITE URINE: POSITIVE
PH URINE: 6
PROTEIN URINE: NEGATIVE
SPECIFIC GRAVITY URINE: 1.01
UROBILINOGEN URINE: 0.2 (ref 0.2–?)

## 2024-10-17 PROCEDURE — 99459 PELVIC EXAMINATION: CPT

## 2024-10-17 PROCEDURE — 99213 OFFICE O/P EST LOW 20 MIN: CPT

## 2024-10-17 RX ORDER — AMOXICILLIN AND CLAVULANATE POTASSIUM 500; 125 MG/1; MG/1
500-125 TABLET, FILM COATED ORAL
Qty: 14 | Refills: 0 | Status: ACTIVE | COMMUNITY
Start: 2024-10-17 | End: 1900-01-01

## 2024-10-18 ENCOUNTER — APPOINTMENT (OUTPATIENT)
Dept: OBGYN | Facility: CLINIC | Age: 69
End: 2024-10-18
Payer: MEDICARE

## 2024-10-18 PROCEDURE — 81002 URINALYSIS NONAUTO W/O SCOPE: CPT

## 2024-10-21 ENCOUNTER — NON-APPOINTMENT (OUTPATIENT)
Age: 69
End: 2024-10-21

## 2024-10-21 LAB
A VAGINAE DNA VAG QL NAA+PROBE: NORMAL
BACTERIA UR CULT: NORMAL
BVAB2 DNA VAG QL NAA+PROBE: NORMAL
C KRUSEI DNA VAG QL NAA+PROBE: NEGATIVE
CANDIDA DNA VAG QL NAA+PROBE: NEGATIVE
MEGA1 DNA VAG QL NAA+PROBE: NORMAL
T VAGINALIS RRNA SPEC QL NAA+PROBE: NEGATIVE

## 2024-11-05 ENCOUNTER — OFFICE (OUTPATIENT)
Dept: URBAN - METROPOLITAN AREA CLINIC 94 | Facility: CLINIC | Age: 69
Setting detail: OPHTHALMOLOGY
End: 2024-11-05
Payer: MEDICARE

## 2024-11-05 DIAGNOSIS — E11.9: ICD-10-CM

## 2024-11-05 DIAGNOSIS — H25.12: ICD-10-CM

## 2024-11-05 DIAGNOSIS — H25.11: ICD-10-CM

## 2024-11-05 DIAGNOSIS — H25.13: ICD-10-CM

## 2024-11-05 PROCEDURE — 92250 FUNDUS PHOTOGRAPHY W/I&R: CPT | Performed by: OPHTHALMOLOGY

## 2024-11-05 PROCEDURE — 92012 INTRM OPH EXAM EST PATIENT: CPT | Performed by: OPHTHALMOLOGY

## 2024-11-05 ASSESSMENT — REFRACTION_CURRENTRX
OS_SPHERE: +1.75
OD_CYLINDER: -0.50
OD_SPHERE: +1.25
OD_SPHERE: +2.00
OS_SPHERE: +1.25
OD_ADD: +2.50
OD_CYLINDER: -0.75
OS_ADD: +2.50
OS_OVR_VA: 20/
OS_OVR_VA: 20/
OD_CYLINDER: -0.50
OS_AXIS: 120
OS_SPHERE: +1.25
OS_AXIS: 120
OS_VPRISM_DIRECTION: BF
OD_OVR_VA: 20/
OS_CYLINDER: -0.50
OD_AXIS: 050
OS_ADD: +2.50
OD_OVR_VA: 20/
OD_AXIS: 060
OD_ADD: +2.75
OD_ADD: +2.50
OD_AXIS: 054
OD_VPRISM_DIRECTION: BF
OS_OVR_VA: 20/
OD_SPHERE: +1.25
OD_VPRISM_DIRECTION: BF
OS_AXIS: 122
OS_CYLINDER: -0.50
OD_OVR_VA: 20/
OS_CYLINDER: -0.50
OS_ADD: +2.50

## 2024-11-05 ASSESSMENT — REFRACTION_MANIFEST
OS_ADD: +2.50
OD_CYLINDER: -0.50
OS_AXIS: 106
OS_CYLINDER: -0.25
OD_AXIS: 050
OS_CYLINDER: -0.50
OS_AXIS: 120
OS_SPHERE: +1.25
OS_VA1: 20/40+1
OD_AXIS: 087
OD_VA1: 20/40
OS_SPHERE: +1.75
OS_SPHERE: +2.25
OS_VA1: 20/30
OS_CYLINDER: -0.50
OS_VA1: 20/40
OD_VA1: 20/30
OU_VA: 20/30
OS_ADD: +2.50
OD_ADD: +2.50
OS_CYLINDER: -0.75
OD_VA1: 20/30-
OD_VA1: 20/30
OD_SPHERE: +1.25
OD_ADD: +2.50
OD_AXIS: 065
OD_CYLINDER: -0.50
OD_CYLINDER: -0.50
OS_SPHERE: +1.25
OD_AXIS: 050
OS_VA1: 20/30
OD_CYLINDER: -0.50
OD_SPHERE: +1.25
OD_ADD: +2.50
OD_SPHERE: +2.00
OS_ADD: +2.50
OS_AXIS: 115
OS_AXIS: 120
OD_SPHERE: +1.75

## 2024-11-05 ASSESSMENT — TONOMETRY
OS_IOP_MMHG: 18
OD_IOP_MMHG: 16

## 2024-11-05 ASSESSMENT — REFRACTION_AUTOREFRACTION
OD_SPHERE: +1.50
OD_CYLINDER: -0.25
OS_AXIS: 104
OS_SPHERE: +2.00
OS_CYLINDER: -1.00
OD_AXIS: 022

## 2024-11-05 ASSESSMENT — KERATOMETRY
OD_K1POWER_DIOPTERS: 46.75
OS_K2POWER_DIOPTERS: 47.50
OS_AXISANGLE_DEGREES: 084
OD_CYLAXISANGLE_DEGREES: 010
OS_CYLAXISANGLE_DEGREES: 084
OS_K1POWER_DIOPTERS: 46.75
OD_CYLPOWER_DEGREES: 1.25
OD_K2POWER_DIOPTERS: 48.00
OS_CYLPOWER_DEGREES: 0.75
OD_K1POWER_DIOPTERS: 46.75
OS_AXISANGLE_DEGREES: 174
OD_K2POWER_DIOPTERS: 48.00
OD_AXISANGLE_DEGREES: 100
OS_K1K2_AVERAGE: 47.125
OS_K2POWER_DIOPTERS: 47.50
OD_AXISANGLE_DEGREES: 010
OD_K1K2_AVERAGE: 47.375
OD_AXISANGLE2_DEGREES: 010
OS_AXISANGLE2_DEGREES: 084
OS_K1POWER_DIOPTERS: 46.75

## 2024-11-05 ASSESSMENT — VISUAL ACUITY
OS_BCVA: 20/80-
OD_BCVA: 20/100

## 2024-11-05 ASSESSMENT — CONFRONTATIONAL VISUAL FIELD TEST (CVF)
OS_FINDINGS: FULL
OD_FINDINGS: FULL

## 2024-11-13 NOTE — PATIENT PROFILE ADULT - NSPROPOAURINARYCATHETER_GEN_A_NUR
Patient is here follow-up of the ORIF of the 5th metatarsal she also has a healing 4th metatarsal fracture.  She is now little over 6 weeks out.  I took her out of her cast.  She has some neuropathy.  I put her into a short boot let her wean out of the wrist she can weightbear as tolerates I will follow back up in 4 weeks with x-rays     no

## 2025-01-30 ENCOUNTER — APPOINTMENT (OUTPATIENT)
Dept: GASTROENTEROLOGY | Facility: CLINIC | Age: 70
End: 2025-01-30
Payer: MEDICARE

## 2025-01-30 VITALS
HEART RATE: 74 BPM | HEIGHT: 63 IN | OXYGEN SATURATION: 98 % | SYSTOLIC BLOOD PRESSURE: 142 MMHG | DIASTOLIC BLOOD PRESSURE: 86 MMHG | BODY MASS INDEX: 24.8 KG/M2 | WEIGHT: 140 LBS

## 2025-01-30 DIAGNOSIS — R93.3 ABNORMAL FINDINGS ON DIAGNOSTIC IMAGING OF OTHER PARTS OF DIGESTIVE TRACT: ICD-10-CM

## 2025-01-30 DIAGNOSIS — R11.2 NAUSEA WITH VOMITING, UNSPECIFIED: ICD-10-CM

## 2025-01-30 PROCEDURE — 99214 OFFICE O/P EST MOD 30 MIN: CPT

## 2025-01-30 RX ORDER — ASPIRIN 81 MG
81 TABLET, DELAYED RELEASE (ENTERIC COATED) ORAL
Refills: 0 | Status: ACTIVE | COMMUNITY

## 2025-01-30 RX ORDER — PANTOPRAZOLE 40 MG/1
40 TABLET, DELAYED RELEASE ORAL
Refills: 0 | Status: ACTIVE | COMMUNITY

## 2025-01-30 RX ORDER — ROSUVASTATIN CALCIUM 5 MG/1
5 TABLET, FILM COATED ORAL
Refills: 0 | Status: ACTIVE | COMMUNITY

## 2025-01-30 RX ORDER — POLYETHYLENE GLYCOL-3350 AND ELECTROLYTES WITH FLAVOR PACK 240; 5.84; 2.98; 6.72; 22.72 G/278.26G; G/278.26G; G/278.26G; G/278.26G; G/278.26G
240 POWDER, FOR SOLUTION ORAL
Qty: 1 | Refills: 0 | Status: ACTIVE | COMMUNITY
Start: 2025-01-30 | End: 1900-01-01

## 2025-01-30 RX ORDER — DICYCLOMINE HYDROCHLORIDE 20 MG/1
20 TABLET ORAL EVERY 6 HOURS
Qty: 120 | Refills: 5 | Status: ACTIVE | COMMUNITY
Start: 2025-01-30 | End: 1900-01-01

## 2025-06-02 ENCOUNTER — APPOINTMENT (OUTPATIENT)
Dept: OBGYN | Facility: CLINIC | Age: 70
End: 2025-06-02
Payer: MEDICARE

## 2025-06-02 VITALS
HEIGHT: 63 IN | WEIGHT: 152 LBS | DIASTOLIC BLOOD PRESSURE: 70 MMHG | BODY MASS INDEX: 26.93 KG/M2 | SYSTOLIC BLOOD PRESSURE: 148 MMHG

## 2025-06-02 DIAGNOSIS — Z12.39 ENCOUNTER FOR OTHER SCREENING FOR MALIGNANT NEOPLASM OF BREAST: ICD-10-CM

## 2025-06-02 DIAGNOSIS — Z01.419 ENCOUNTER FOR GYNECOLOGICAL EXAMINATION (GENERAL) (ROUTINE) W/OUT ABNORMAL FINDINGS: ICD-10-CM

## 2025-06-02 DIAGNOSIS — Z12.4 ENCOUNTER FOR SCREENING FOR MALIGNANT NEOPLASM OF CERVIX: ICD-10-CM

## 2025-06-02 PROCEDURE — 99459 PELVIC EXAMINATION: CPT

## 2025-06-02 PROCEDURE — 99397 PER PM REEVAL EST PAT 65+ YR: CPT

## 2025-06-04 LAB — HPV HIGH+LOW RISK DNA PNL CVX: NOT DETECTED

## 2025-06-05 LAB — CYTOLOGY CVX/VAG DOC THIN PREP: ABNORMAL

## 2025-06-18 ENCOUNTER — OFFICE (OUTPATIENT)
Dept: URBAN - METROPOLITAN AREA CLINIC 94 | Facility: CLINIC | Age: 70
Setting detail: OPHTHALMOLOGY
End: 2025-06-18
Payer: MEDICARE

## 2025-06-18 DIAGNOSIS — H25.11: ICD-10-CM

## 2025-06-18 DIAGNOSIS — H25.13: ICD-10-CM

## 2025-06-18 DIAGNOSIS — H43.393: ICD-10-CM

## 2025-06-18 PROBLEM — H25.12 CATARACT; RIGHT EYE, LEFT EYE, BOTH EYES: Status: ACTIVE | Noted: 2025-06-18

## 2025-06-18 PROBLEM — H25.043 POSTERIOR SUBCAPSULAR CATARACT 366.14; BOTH EYES: Status: ACTIVE | Noted: 2025-06-18

## 2025-06-18 PROCEDURE — 92250 FUNDUS PHOTOGRAPHY W/I&R: CPT | Performed by: OPHTHALMOLOGY

## 2025-06-18 PROCEDURE — 92136 OPHTHALMIC BIOMETRY: CPT | Performed by: OPHTHALMOLOGY

## 2025-06-18 PROCEDURE — 99214 OFFICE O/P EST MOD 30 MIN: CPT | Performed by: OPHTHALMOLOGY

## 2025-06-18 ASSESSMENT — KERATOMETRY
OD_K1POWER_DIOPTERS: 46.25
OD_AXISANGLE2_DEGREES: 172
OS_AXISANGLE2_DEGREES: 025
OS_K2POWER_DIOPTERS: 47.25
OD_CYLAXISANGLE_DEGREES: 172
OD_AXISANGLE_DEGREES: 82
OD_K2POWER_DIOPTERS: 47.25
OS_AXISANGLE_DEGREES: 115
OS_CYLPOWER_DEGREES: 0.75
OS_K1POWER_DIOPTERS: 46.50
OS_CYLAXISANGLE_DEGREES: 025
OD_K1K2_AVERAGE: 46.75
OS_AXISANGLE_DEGREES: 025
OD_K1POWER_DIOPTERS: 46.25
OS_K2POWER_DIOPTERS: 47.25
OS_K1POWER_DIOPTERS: 46.50
OD_K2POWER_DIOPTERS: 47.25
OS_K1K2_AVERAGE: 46.875
OD_AXISANGLE_DEGREES: 172
OD_CYLPOWER_DEGREES: 1

## 2025-06-18 ASSESSMENT — REFRACTION_MANIFEST
OD_ADD: +2.50
OS_VA1: 20/40+1
OD_AXIS: 050
OS_VA1: 20/30
OD_AXIS: 022
OD_ADD: +2.50
OD_SPHERE: +1.75
OS_SPHERE: +2.25
OD_CYLINDER: -0.50
OD_CYLINDER: -0.25
OS_AXIS: 115
OD_VA1: 20/30
OS_CYLINDER: -0.50
OD_SPHERE: +1.25
OD_SPHERE: +1.50
OD_AXIS: 065
OS_AXIS: 105
OD_ADD: +2.50
OS_ADD: +2.50
OD_CYLINDER: -0.50
OS_CYLINDER: -1.00
OS_AXIS: 120
OS_VA1: 20/40
OS_SPHERE: +1.25
OS_ADD: +2.50
OD_VA1: 20/40
OD_CYLINDER: -0.50
OD_SPHERE: +1.25
OD_AXIS: 050
OS_AXIS: 120
OU_VA: 20/30
OS_SPHERE: +1.25
OS_CYLINDER: -0.25
OS_SPHERE: +1.75
OD_VA1: 20/30-
OS_CYLINDER: -0.50
OS_VA1: 20/30
OD_VA1: 20/30
OS_ADD: +2.50

## 2025-06-18 ASSESSMENT — REFRACTION_CURRENTRX
OD_AXIS: 054
OD_VPRISM_DIRECTION: BF
OS_AXIS: 120
OD_CYLINDER: -0.50
OS_OVR_VA: 20/
OS_SPHERE: +1.25
OD_VPRISM_DIRECTION: BF
OD_AXIS: 050
OD_SPHERE: +1.25
OS_CYLINDER: -0.50
OS_ADD: +2.50
OS_AXIS: 122
OS_SPHERE: +1.75
OS_CYLINDER: -0.50
OD_CYLINDER: -0.50
OS_AXIS: 120
OD_OVR_VA: 20/
OD_AXIS: 060
OD_OVR_VA: 20/
OD_SPHERE: +2.00
OS_ADD: +2.50
OS_ADD: +2.50
OD_ADD: +2.75
OD_SPHERE: +1.25
OS_OVR_VA: 20/
OS_VPRISM_DIRECTION: BF
OS_OVR_VA: 20/
OS_CYLINDER: -0.50
OD_CYLINDER: -0.75
OS_SPHERE: +1.25
OD_OVR_VA: 20/
OD_ADD: +2.50
OD_ADD: +2.50

## 2025-06-18 ASSESSMENT — REFRACTION_AUTOREFRACTION
OD_CYLINDER: -0.25
OS_AXIS: 105
OS_SPHERE: +2.25
OS_CYLINDER: -1.00
OD_SPHERE: +1.50
OD_AXIS: 022

## 2025-06-18 ASSESSMENT — TONOMETRY
OD_IOP_MMHG: 16
OS_IOP_MMHG: 19

## 2025-06-18 ASSESSMENT — CONFRONTATIONAL VISUAL FIELD TEST (CVF)
OD_FINDINGS: FULL
OS_FINDINGS: FULL

## 2025-06-18 ASSESSMENT — VISUAL ACUITY
OD_BCVA: 20/40
OS_BCVA: 20/40

## 2025-06-23 NOTE — ED PROVIDER NOTE - CROS ED CONS ALL NEG
Message  Received: 3 days ago  Kina Miranda MD P Gladysz, M Endo Nurse Msg Pool  Pt has been seen by Dr Garcia in 2019.  I have not seen him yet but he has an apt with me scheduled in Sept 2025.  Pt had FNA of the R thyroid nodule-colloid nodule. No cancer.    ------------------------------------------------------    RN called patient, no answer, LVM requesting return call.   negative...

## 2025-07-07 ENCOUNTER — TRANSCRIPTION ENCOUNTER (OUTPATIENT)
Age: 70
End: 2025-07-07

## 2025-07-07 ENCOUNTER — RESULT REVIEW (OUTPATIENT)
Age: 70
End: 2025-07-07

## 2025-07-07 ENCOUNTER — APPOINTMENT (OUTPATIENT)
Dept: GASTROENTEROLOGY | Facility: GI CENTER | Age: 70
End: 2025-07-07
Payer: MEDICARE

## 2025-07-07 ENCOUNTER — OUTPATIENT (OUTPATIENT)
Dept: OUTPATIENT SERVICES | Facility: HOSPITAL | Age: 70
LOS: 1 days | End: 2025-07-07
Payer: MEDICARE

## 2025-07-07 DIAGNOSIS — R93.3 ABNORMAL FINDINGS ON DIAGNOSTIC IMAGING OF OTHER PARTS OF DIGESTIVE TRACT: ICD-10-CM

## 2025-07-07 DIAGNOSIS — Z98.891 HISTORY OF UTERINE SCAR FROM PREVIOUS SURGERY: Chronic | ICD-10-CM

## 2025-07-07 DIAGNOSIS — Z98.51 TUBAL LIGATION STATUS: Chronic | ICD-10-CM

## 2025-07-07 LAB — GLUCOSE BLDC GLUCOMTR-MCNC: 128 MG/DL — HIGH (ref 70–99)

## 2025-07-07 PROCEDURE — 45380 COLONOSCOPY AND BIOPSY: CPT

## 2025-07-07 PROCEDURE — 88305 TISSUE EXAM BY PATHOLOGIST: CPT

## 2025-07-07 PROCEDURE — 88305 TISSUE EXAM BY PATHOLOGIST: CPT | Mod: 26

## 2025-07-07 PROCEDURE — 82962 GLUCOSE BLOOD TEST: CPT

## 2025-07-09 LAB — SURGICAL PATHOLOGY STUDY: SIGNIFICANT CHANGE UP

## 2025-07-15 NOTE — ED ADULT NURSE NOTE - ATTEMPT TO OOB
Note generated to link consult order for EP. See Dr. Brandon' original consult note from 7/8/25   no

## 2025-07-16 ENCOUNTER — ASC (OUTPATIENT)
Dept: URBAN - METROPOLITAN AREA SURGERY 8 | Facility: SURGERY | Age: 70
Setting detail: OPHTHALMOLOGY
End: 2025-07-16
Payer: MEDICARE

## 2025-07-16 DIAGNOSIS — H25.11: ICD-10-CM

## 2025-07-16 PROCEDURE — 66984 XCAPSL CTRC RMVL W/O ECP: CPT | Mod: 79,RT | Performed by: OPHTHALMOLOGY

## 2025-07-17 ENCOUNTER — OFFICE (OUTPATIENT)
Dept: URBAN - METROPOLITAN AREA CLINIC 112 | Facility: CLINIC | Age: 70
Setting detail: OPHTHALMOLOGY
End: 2025-07-17
Payer: MEDICARE

## 2025-07-17 ENCOUNTER — RX ONLY (RX ONLY)
Age: 70
End: 2025-07-17

## 2025-07-17 DIAGNOSIS — Z96.1: ICD-10-CM

## 2025-07-17 DIAGNOSIS — H25.12: ICD-10-CM

## 2025-07-17 PROCEDURE — 99024 POSTOP FOLLOW-UP VISIT: CPT | Performed by: PHYSICIAN ASSISTANT

## 2025-07-17 ASSESSMENT — REFRACTION_CURRENTRX
OS_SPHERE: +1.75
OD_CYLINDER: -0.50
OD_VPRISM_DIRECTION: BF
OD_SPHERE: +1.25
OD_ADD: +2.50
OD_OVR_VA: 20/
OS_SPHERE: +1.25
OS_AXIS: 120
OD_OVR_VA: 20/
OS_VPRISM_DIRECTION: BF
OS_OVR_VA: 20/
OS_OVR_VA: 20/
OD_VPRISM_DIRECTION: BF
OS_ADD: +2.50
OS_AXIS: 120
OD_SPHERE: +2.00
OD_CYLINDER: -0.75
OS_ADD: +2.50
OD_CYLINDER: -0.50
OD_ADD: +2.50
OD_SPHERE: +1.25
OS_ADD: +2.50
OD_AXIS: 054
OS_AXIS: 122
OD_AXIS: 050
OD_AXIS: 060
OS_SPHERE: +1.25
OS_CYLINDER: -0.50
OD_ADD: +2.75
OS_OVR_VA: 20/
OD_OVR_VA: 20/

## 2025-07-17 ASSESSMENT — REFRACTION_MANIFEST
OS_AXIS: 120
OS_SPHERE: +2.25
OS_CYLINDER: -0.50
OD_CYLINDER: -0.50
OS_AXIS: 115
OD_CYLINDER: -0.50
OS_ADD: +2.50
OD_VA1: 20/30-
OS_AXIS: 120
OD_ADD: +2.50
OD_VA1: 20/30
OD_AXIS: 065
OS_SPHERE: +1.25
OD_VA1: 20/30
OS_CYLINDER: -0.50
OS_SPHERE: +1.25
OS_ADD: +2.50
OD_VA1: 20/40
OD_CYLINDER: -0.50
OS_VA1: 20/30
OS_SPHERE: +1.75
OS_VA1: 20/40
OU_VA: 20/30
OD_ADD: +2.50
OD_AXIS: 050
OS_VA1: 20/40+1
OS_ADD: +2.50
OD_CYLINDER: -0.25
OD_ADD: +2.50
OS_CYLINDER: -0.25
OD_SPHERE: +1.25
OD_AXIS: 050
OD_AXIS: 022
OS_AXIS: 105
OD_SPHERE: +1.75
OD_SPHERE: +1.25
OD_SPHERE: +1.50
OS_VA1: 20/30
OS_CYLINDER: -1.00

## 2025-07-17 ASSESSMENT — CONFRONTATIONAL VISUAL FIELD TEST (CVF)
OD_FINDINGS: FULL
OS_FINDINGS: FULL

## 2025-07-17 ASSESSMENT — CORNEAL EDEMA - FOLDS/STRIAE: OD_FOLDSSTRIAE: T

## 2025-07-17 ASSESSMENT — KERATOMETRY
OD_K2POWER_DIOPTERS: 47.25
OD_K1POWER_DIOPTERS: 46.25
OS_AXISANGLE_DEGREES: 025
OD_AXISANGLE_DEGREES: 172
OS_K1POWER_DIOPTERS: 46.50
OS_K2POWER_DIOPTERS: 47.25

## 2025-07-17 ASSESSMENT — TONOMETRY
OD_IOP_MMHG: 17
OS_IOP_MMHG: 17

## 2025-07-17 ASSESSMENT — VISUAL ACUITY
OD_BCVA: 20/40
OS_BCVA: 20/50-1

## 2025-07-17 ASSESSMENT — REFRACTION_AUTOREFRACTION
OD_AXIS: 022
OD_SPHERE: +1.50
OD_CYLINDER: -0.25
OS_SPHERE: +2.25
OS_AXIS: 105
OS_CYLINDER: -1.00

## 2025-07-23 ENCOUNTER — OFFICE (OUTPATIENT)
Dept: URBAN - METROPOLITAN AREA CLINIC 94 | Facility: CLINIC | Age: 70
Setting detail: OPHTHALMOLOGY
End: 2025-07-23
Payer: MEDICARE

## 2025-07-23 DIAGNOSIS — Z96.1: ICD-10-CM

## 2025-07-23 DIAGNOSIS — H25.12: ICD-10-CM

## 2025-07-23 PROCEDURE — 99024 POSTOP FOLLOW-UP VISIT: CPT | Performed by: PHYSICIAN ASSISTANT

## 2025-07-23 ASSESSMENT — REFRACTION_CURRENTRX
OD_ADD: +2.50
OS_AXIS: 122
OS_CYLINDER: -0.50
OD_ADD: +2.50
OD_ADD: +2.75
OD_AXIS: 060
OS_CYLINDER: -0.50
OS_ADD: +2.50
OD_OVR_VA: 20/
OD_CYLINDER: -0.75
OD_SPHERE: +1.25
OD_AXIS: 054
OS_OVR_VA: 20/
OS_VPRISM_DIRECTION: BF
OD_CYLINDER: -0.50
OD_CYLINDER: -0.50
OS_SPHERE: +1.25
OS_ADD: +2.50
OD_AXIS: 050
OS_ADD: +2.50
OS_OVR_VA: 20/
OS_OVR_VA: 20/
OD_OVR_VA: 20/
OD_OVR_VA: 20/
OS_SPHERE: +1.25
OD_VPRISM_DIRECTION: BF
OD_SPHERE: +2.00
OS_CYLINDER: -0.50
OD_VPRISM_DIRECTION: BF
OS_AXIS: 120
OD_SPHERE: +1.25
OS_AXIS: 120
OS_SPHERE: +1.75

## 2025-07-23 ASSESSMENT — REFRACTION_MANIFEST
OD_ADD: +2.50
OD_ADD: +2.50
OD_SPHERE: +1.50
OD_AXIS: 022
OD_ADD: +2.50
OS_AXIS: 105
OD_VA1: 20/30
OS_CYLINDER: -0.25
OS_SPHERE: +1.25
OU_VA: 20/30
OD_SPHERE: +1.75
OD_CYLINDER: -0.25
OD_VA1: 20/30-
OD_CYLINDER: -0.50
OS_ADD: +2.50
OS_SPHERE: +1.75
OD_CYLINDER: -0.50
OS_AXIS: 115
OD_SPHERE: +1.25
OD_VA1: 20/30
OD_AXIS: 065
OS_CYLINDER: -0.50
OS_VA1: 20/40+1
OS_ADD: +2.50
OD_AXIS: 050
OS_CYLINDER: -0.50
OS_SPHERE: +2.25
OD_SPHERE: +1.25
OD_VA1: 20/40
OS_AXIS: 120
OS_AXIS: 120
OS_VA1: 20/40
OD_AXIS: 050
OS_ADD: +2.50
OS_CYLINDER: -1.00
OS_VA1: 20/30
OS_VA1: 20/30
OD_CYLINDER: -0.50
OS_SPHERE: +1.25

## 2025-07-23 ASSESSMENT — KERATOMETRY
OS_K1POWER_DIOPTERS: 46.50
OD_K2POWER_DIOPTERS: 47.25
OS_K2POWER_DIOPTERS: 47.25
OS_AXISANGLE_DEGREES: 020
OD_AXISANGLE_DEGREES: 105
OD_K1POWER_DIOPTERS: 46.25

## 2025-07-23 ASSESSMENT — REFRACTION_AUTOREFRACTION
OD_CYLINDER: -0.75
OD_SPHERE: +0.25
OS_AXIS: 120
OD_AXIS: 020
OS_CYLINDER: -0.75
OS_SPHERE: +1.25

## 2025-07-23 ASSESSMENT — VISUAL ACUITY
OD_BCVA: 20/50
OS_BCVA: 20/30

## 2025-07-23 ASSESSMENT — CONFRONTATIONAL VISUAL FIELD TEST (CVF)
OS_FINDINGS: FULL
OD_FINDINGS: FULL

## 2025-07-23 ASSESSMENT — TONOMETRY
OS_IOP_MMHG: 19
OD_IOP_MMHG: 18

## 2025-08-13 ENCOUNTER — ASC (OUTPATIENT)
Dept: URBAN - METROPOLITAN AREA SURGERY 8 | Facility: SURGERY | Age: 70
Setting detail: OPHTHALMOLOGY
End: 2025-08-13
Payer: MEDICARE

## 2025-08-13 DIAGNOSIS — H25.12: ICD-10-CM

## 2025-08-13 PROCEDURE — 66984 XCAPSL CTRC RMVL W/O ECP: CPT | Mod: 79,LT | Performed by: OPHTHALMOLOGY

## 2025-08-14 ENCOUNTER — OFFICE (OUTPATIENT)
Dept: URBAN - METROPOLITAN AREA CLINIC 112 | Facility: CLINIC | Age: 70
Setting detail: OPHTHALMOLOGY
End: 2025-08-14
Payer: MEDICARE

## 2025-08-14 DIAGNOSIS — Z96.1: ICD-10-CM

## 2025-08-14 PROCEDURE — 99024 POSTOP FOLLOW-UP VISIT: CPT | Performed by: PHYSICIAN ASSISTANT

## 2025-08-14 ASSESSMENT — TONOMETRY
OS_IOP_MMHG: 16
OD_IOP_MMHG: 14

## 2025-08-14 ASSESSMENT — REFRACTION_MANIFEST
OD_CYLINDER: -0.50
OD_AXIS: 050
OS_AXIS: 115
OS_SPHERE: +1.75
OS_SPHERE: +1.25
OS_VA1: 20/40
OS_CYLINDER: -0.25
OS_CYLINDER: -0.50
OS_AXIS: 120
OS_ADD: +2.50
OD_VA1: 20/30
OS_ADD: +2.50
OD_ADD: +2.50
OS_AXIS: 120
OS_SPHERE: +1.25
OS_CYLINDER: -0.50
OD_VA1: 20/30
OD_AXIS: 065
OD_SPHERE: +1.25
OS_ADD: +2.50
OD_CYLINDER: -0.50
OD_AXIS: 022
OS_VA1: 20/30
OD_SPHERE: +1.75
OD_CYLINDER: -0.50
OS_CYLINDER: -1.00
OD_AXIS: 050
OS_VA1: 20/40+1
OD_VA1: 20/30-
OD_SPHERE: +1.50
OD_ADD: +2.50
OD_VA1: 20/40
OS_VA1: 20/30
OS_SPHERE: +2.25
OD_CYLINDER: -0.25
OS_AXIS: 105
OU_VA: 20/30
OD_SPHERE: +1.25
OD_ADD: +2.50

## 2025-08-14 ASSESSMENT — REFRACTION_CURRENTRX
OS_CYLINDER: -0.50
OS_SPHERE: +1.75
OD_ADD: +2.50
OD_AXIS: 054
OD_VPRISM_DIRECTION: BF
OS_OVR_VA: 20/
OS_CYLINDER: -0.50
OD_ADD: +2.50
OD_AXIS: 050
OS_ADD: +2.50
OS_CYLINDER: -0.50
OD_AXIS: 060
OD_CYLINDER: -0.75
OS_OVR_VA: 20/
OD_OVR_VA: 20/
OD_OVR_VA: 20/
OS_AXIS: 120
OD_OVR_VA: 20/
OD_CYLINDER: -0.50
OS_AXIS: 120
OD_SPHERE: +1.25
OS_SPHERE: +1.25
OS_ADD: +2.50
OS_ADD: +2.50
OS_VPRISM_DIRECTION: BF
OS_SPHERE: +1.25
OD_CYLINDER: -0.50
OD_SPHERE: +1.25
OS_AXIS: 122
OD_ADD: +2.75
OD_VPRISM_DIRECTION: BF
OS_OVR_VA: 20/
OD_SPHERE: +2.00

## 2025-08-14 ASSESSMENT — VISUAL ACUITY
OD_BCVA: 20/70-1
OS_BCVA: 20/30

## 2025-08-14 ASSESSMENT — CONFRONTATIONAL VISUAL FIELD TEST (CVF)
OD_FINDINGS: FULL
OS_FINDINGS: FULL

## 2025-08-14 ASSESSMENT — CORNEAL EDEMA - FOLDS/STRIAE: OS_FOLDSSTRIAE: 1+

## 2025-08-14 ASSESSMENT — KERATOMETRY
OS_K1POWER_DIOPTERS: 47.75
OD_K2POWER_DIOPTERS: 47.50
OS_K2POWER_DIOPTERS: 48.75
OS_AXISANGLE_DEGREES: 076
OD_AXISANGLE_DEGREES: 125
OD_K1POWER_DIOPTERS: 47.00

## 2025-08-14 ASSESSMENT — CORNEAL EDEMA CLINICAL DESCRIPTION: OS_CORNEALEDEMA: 1+

## 2025-08-14 ASSESSMENT — REFRACTION_AUTOREFRACTION
OS_CYLINDER: -0.75
OD_AXIS: 065
OD_SPHERE: 0.00
OD_CYLINDER: -0.50
OS_SPHERE: +0.50
OS_AXIS: 127

## 2025-08-20 ENCOUNTER — OFFICE (OUTPATIENT)
Dept: URBAN - METROPOLITAN AREA CLINIC 94 | Facility: CLINIC | Age: 70
Setting detail: OPHTHALMOLOGY
End: 2025-08-20
Payer: MEDICARE

## 2025-08-20 DIAGNOSIS — Z96.1: ICD-10-CM

## 2025-08-20 PROCEDURE — 99024 POSTOP FOLLOW-UP VISIT: CPT | Performed by: PHYSICIAN ASSISTANT

## 2025-08-20 ASSESSMENT — REFRACTION_CURRENTRX
OD_CYLINDER: -0.50
OD_SPHERE: +2.00
OS_AXIS: 122
OD_AXIS: 054
OD_AXIS: 050
OD_OVR_VA: 20/
OD_CYLINDER: -0.50
OS_SPHERE: +1.75
OD_ADD: +2.50
OS_ADD: +2.50
OD_ADD: +2.75
OS_AXIS: 120
OS_CYLINDER: -0.50
OD_OVR_VA: 20/
OS_CYLINDER: -0.50
OS_AXIS: 120
OS_VPRISM_DIRECTION: BF
OD_VPRISM_DIRECTION: BF
OD_AXIS: 060
OS_ADD: +2.50
OD_SPHERE: +1.25
OD_SPHERE: +1.25
OS_SPHERE: +1.25
OD_CYLINDER: -0.75
OD_VPRISM_DIRECTION: BF
OS_ADD: +2.50
OS_SPHERE: +1.25
OS_CYLINDER: -0.50
OS_OVR_VA: 20/
OS_OVR_VA: 20/
OD_OVR_VA: 20/
OS_OVR_VA: 20/
OD_ADD: +2.50

## 2025-08-20 ASSESSMENT — REFRACTION_AUTOREFRACTION
OD_AXIS: 068
OS_AXIS: 163
OD_SPHERE: +0.75
OS_SPHERE: +1.25
OS_CYLINDER: -0.75
OD_CYLINDER: -1.00

## 2025-08-20 ASSESSMENT — REFRACTION_MANIFEST
OS_AXIS: 120
OS_SPHERE: +1.75
OD_AXIS: 050
OD_SPHERE: +1.25
OS_VA1: 20/30
OS_CYLINDER: -0.25
OD_AXIS: 022
OU_VA: 20/30
OD_CYLINDER: -0.50
OD_CYLINDER: -0.50
OD_SPHERE: +1.50
OS_SPHERE: +2.25
OS_CYLINDER: -1.00
OD_AXIS: 065
OD_CYLINDER: -0.25
OD_VA1: 20/30
OS_SPHERE: +1.25
OS_CYLINDER: -0.50
OS_CYLINDER: -0.50
OD_ADD: +2.50
OS_VA1: 20/40
OD_ADD: +2.50
OS_VA1: 20/40+1
OD_SPHERE: +1.25
OD_VA1: 20/30-
OD_VA1: 20/40
OS_ADD: +2.50
OD_SPHERE: +1.75
OS_ADD: +2.50
OD_AXIS: 050
OS_AXIS: 115
OD_ADD: +2.50
OS_AXIS: 120
OS_SPHERE: +1.25
OD_CYLINDER: -0.50
OS_AXIS: 105
OD_VA1: 20/30
OS_VA1: 20/30
OS_ADD: +2.50

## 2025-08-20 ASSESSMENT — KERATOMETRY
OD_K1POWER_DIOPTERS: 47.00
OS_AXISANGLE_DEGREES: 076
OD_AXISANGLE_DEGREES: 125
OS_K2POWER_DIOPTERS: 48.75
OS_K1POWER_DIOPTERS: 47.75
OD_K2POWER_DIOPTERS: 47.50

## 2025-08-20 ASSESSMENT — CONFRONTATIONAL VISUAL FIELD TEST (CVF)
OS_FINDINGS: FULL
OD_FINDINGS: FULL

## 2025-08-20 ASSESSMENT — CORNEAL EDEMA - FOLDS/STRIAE: OS_FOLDSSTRIAE: T

## 2025-08-20 ASSESSMENT — TONOMETRY
OD_IOP_MMHG: 16
OS_IOP_MMHG: 18

## 2025-08-20 ASSESSMENT — VISUAL ACUITY
OD_BCVA: 20/60-
OS_BCVA: 20/30-

## (undated) DEVICE — FORCEP RADIAL JAW 4 240CM DISP

## (undated) DEVICE — KIT DEFENDO 4 OLY 4 PC

## (undated) DEVICE — VALVE ENDOSCOPE DEFENDO SINGLE USE